# Patient Record
Sex: FEMALE | Race: WHITE | Employment: OTHER | URBAN - METROPOLITAN AREA
[De-identification: names, ages, dates, MRNs, and addresses within clinical notes are randomized per-mention and may not be internally consistent; named-entity substitution may affect disease eponyms.]

---

## 2017-03-20 PROBLEM — S42.034A CLOSED NONDISPLACED FRACTURE OF ACROMIAL END OF RIGHT CLAVICLE: Status: ACTIVE | Noted: 2017-03-20

## 2017-12-10 PROBLEM — S72.141A INTERTROCHANTERIC FRACTURE OF RIGHT HIP, CLOSED, INITIAL ENCOUNTER (HCC): Status: ACTIVE | Noted: 2017-12-10

## 2017-12-11 PROBLEM — I10 ESSENTIAL HYPERTENSION: Chronic | Status: ACTIVE | Noted: 2017-12-11

## 2017-12-11 PROBLEM — E78.2 MIXED HYPERLIPIDEMIA: Chronic | Status: ACTIVE | Noted: 2017-12-11

## 2017-12-12 PROBLEM — J98.01 BRONCHOSPASM: Status: ACTIVE | Noted: 2017-12-12

## 2018-03-13 ENCOUNTER — OFFICE VISIT (OUTPATIENT)
Dept: ORTHOPEDIC SURGERY | Age: 78
End: 2018-03-13

## 2018-03-13 VITALS — BODY MASS INDEX: 26.36 KG/M2 | WEIGHT: 164 LBS | HEIGHT: 66 IN

## 2018-03-13 DIAGNOSIS — S72.141A INTERTROCHANTERIC FRACTURE OF RIGHT HIP, CLOSED, INITIAL ENCOUNTER (HCC): Primary | ICD-10-CM

## 2018-03-13 PROCEDURE — 99024 POSTOP FOLLOW-UP VISIT: CPT | Performed by: ORTHOPAEDIC SURGERY

## 2018-03-13 NOTE — PROGRESS NOTES
tumor,(-) TIA, (-)stroke, (-)headaches, (-)Parkinson disease,(-) memory loss, (-) LOC. Cardiovascular: (-) Chest pain, (-) swelling in legs/feet, (-) SOB, (-) cramping in legs/feet with walking. Subjective:    Constitutional:    The patient is alert and oriented x 3, appears to be stated age and in no distress. Skin:    Upon inspection: the skin appears warm, dry and intact. There is  a previous scar over the affected area. There is not any cellulitis, lymphedema or cutaneous lesions noted in the lower extremities. Upon palpation there is no induration noted. Neurologic:    Gait: normal;  Motor exam of the lower extremities show ; quadriceps, hamstrings, foot dorsi and plantar flexors intact R.  5/5 and L. 5/5. Deep tendon reflexes are 4/4 at the knees and 4/4 at the ankles with strong extensor hallicus longus motor strength bilaterally. Sensory to both feet is intact to all sensory roots. Cardiovascular: The vascular exam is normal and is well perfused to distal extremities. Distal pulses DP/PT: R. 2+; L. 2+. There is cap refill noted less than two seconds in all digits. There is not edema of the bilateral lower extremities. There is not varicosities noted in the distal extremities. Lymph:    Upon palpation,  there is no lymphadenopathy noted in bilateral lower extremities. Musculoskeletal:  Gait: normal; examination of the nails and digits reveal no cyanosis or clubbing. Lumbar exam:    On visual inspection, there is not deformity of the spine. full range of motion, no tenderness, palpable spasm or pain on motion. Special tests: Straight Leg Raise negative, Ilda testnegative. Hip exam:    Upon visual inspection there is not a deformity noted. Patient complains of tenderness at the  No where. Exam of the right hip shows none leg length discrepancy.   Range of motion of the involved/uninvolved hip is 25 degrees internal rotation and 35 degrees external

## 2018-06-11 DIAGNOSIS — S72.141A INTERTROCHANTERIC FRACTURE OF RIGHT HIP, CLOSED, INITIAL ENCOUNTER (HCC): Primary | ICD-10-CM

## 2018-06-12 ENCOUNTER — OFFICE VISIT (OUTPATIENT)
Dept: ORTHOPEDIC SURGERY | Age: 78
End: 2018-06-12
Payer: MEDICARE

## 2018-06-12 DIAGNOSIS — S72.141A INTERTROCHANTERIC FRACTURE OF RIGHT HIP, CLOSED, INITIAL ENCOUNTER (HCC): Primary | ICD-10-CM

## 2018-06-12 PROCEDURE — 99213 OFFICE O/P EST LOW 20 MIN: CPT | Performed by: ORTHOPAEDIC SURGERY

## 2018-08-03 ENCOUNTER — HOSPITAL ENCOUNTER (OUTPATIENT)
Age: 78
Discharge: HOME | End: 2018-08-05
Payer: MEDICARE

## 2018-08-03 ENCOUNTER — HOSPITAL ENCOUNTER (OUTPATIENT)
Dept: GENERAL RADIOLOGY | Age: 78
Discharge: HOME | End: 2018-08-05
Payer: MEDICARE

## 2018-08-03 DIAGNOSIS — J44.1 OBSTRUCTIVE CHRONIC BRONCHITIS WITH EXACERBATION (HCC): ICD-10-CM

## 2018-08-03 PROCEDURE — 71046 X-RAY EXAM CHEST 2 VIEWS: CPT

## 2018-10-10 DIAGNOSIS — M25.561 ACUTE PAIN OF RIGHT KNEE: Primary | ICD-10-CM

## 2018-10-11 ENCOUNTER — OFFICE VISIT (OUTPATIENT)
Dept: ORTHOPEDIC SURGERY | Age: 78
End: 2018-10-11
Payer: MEDICARE

## 2018-10-11 VITALS — WEIGHT: 145 LBS | HEIGHT: 66 IN | BODY MASS INDEX: 23.3 KG/M2

## 2018-10-11 DIAGNOSIS — Z96.651 HISTORY OF TOTAL RIGHT KNEE REPLACEMENT: Primary | ICD-10-CM

## 2018-10-11 PROCEDURE — 99213 OFFICE O/P EST LOW 20 MIN: CPT | Performed by: ORTHOPAEDIC SURGERY

## 2018-10-11 NOTE — PROGRESS NOTES
Chief Complaint   Patient presents with    Knee Pain     Right knee       Subjective:     Patient ID: Dae Quintanilla is a 69 y.o..  female    Knee Pain  Patient complains of right knee pain. This is evaluated as a personal injury. There was not a history of injury. She did have a right TKA in 2014. She reports intermittent swelling and pain. The pain is located anterior. She describes  Her symptoms as aching. She has not experienced popping, clicking, locking, and giving way in the affected knee. The patient has not had pain with kneeling, squating, and climbing stairs. Symptoms improve with rest. The symptoms are worse with activity. The knee has not given out or felt unstable. The patient can bend and straighten the knee fully. The patient is active in none. Treatment to date has been ice, heat, Tylenol, without significant relief. The patient is not working. The patients occupation is retired.      Past Medical History:   Diagnosis Date    Arthritis     joints, knees, back  djd    Chronic back pain     COPD (chronic obstructive pulmonary disease) (HCC)     Diabetes mellitus (HCC)     GERD (gastroesophageal reflux disease)     Hyperlipidemia     Thyroid disease      Past Surgical History:   Procedure Laterality Date    APPENDECTOMY      BREAST SURGERY      cyst     CHOLECYSTECTOMY      COLONOSCOPY      ENDOSCOPY, COLON, DIAGNOSTIC      HYSTERECTOMY      JOINT REPLACEMENT      left knee    KNEE ARTHROPLASTY Right 1/15/2014    TOTAL KNEE ARTHROPLASTY    THYROIDECTOMY         Current Outpatient Prescriptions:     ipratropium-albuterol (DUONEB) 0.5-2.5 (3) MG/3ML SOLN nebulizer solution, Inhale 3 mLs into the lungs every 6 hours as needed for Shortness of Breath, Disp: 360 mL, Rfl: 0    enoxaparin (LOVENOX) 30 MG/0.3ML injection, Inject 0.7 mLs into the skin 2 times daily, Disp: 6 Syringe, Rfl: 1    amLODIPine (NORVASC) 5 MG tablet, Take 1 tablet by mouth daily, Disp: 30 tablet, Rfl:

## 2018-11-11 ENCOUNTER — APPOINTMENT (OUTPATIENT)
Dept: GENERAL RADIOLOGY | Age: 78
DRG: 872 | End: 2018-11-11
Payer: MEDICARE

## 2018-11-11 ENCOUNTER — HOSPITAL ENCOUNTER (INPATIENT)
Age: 78
LOS: 3 days | Discharge: NURSING, REHAB OR LONG-TERM CARE | DRG: 872 | End: 2018-11-14
Attending: EMERGENCY MEDICINE | Admitting: INTERNAL MEDICINE
Payer: MEDICARE

## 2018-11-11 ENCOUNTER — APPOINTMENT (OUTPATIENT)
Dept: CT IMAGING | Age: 78
DRG: 872 | End: 2018-11-11
Payer: MEDICARE

## 2018-11-11 DIAGNOSIS — N39.0 URINARY TRACT INFECTION WITHOUT HEMATURIA, SITE UNSPECIFIED: Primary | ICD-10-CM

## 2018-11-11 DIAGNOSIS — M54.2 NECK PAIN: ICD-10-CM

## 2018-11-11 DIAGNOSIS — E87.6 HYPOKALEMIA: ICD-10-CM

## 2018-11-11 DIAGNOSIS — R00.0 SINUS TACHYCARDIA: ICD-10-CM

## 2018-11-11 LAB
ALBUMIN SERPL-MCNC: 3.3 G/DL (ref 3.5–5.2)
ALP BLD-CCNC: 74 U/L (ref 35–104)
ALT SERPL-CCNC: 15 U/L (ref 0–32)
ANION GAP SERPL CALCULATED.3IONS-SCNC: 17 MMOL/L (ref 7–16)
AST SERPL-CCNC: 29 U/L (ref 0–31)
BACTERIA: ABNORMAL /HPF
BASOPHILS ABSOLUTE: 0 E9/L (ref 0–0.2)
BASOPHILS ABSOLUTE: 0.2 E9/L (ref 0–0.2)
BASOPHILS RELATIVE PERCENT: 0.2 % (ref 0–2)
BASOPHILS RELATIVE PERCENT: 0.9 % (ref 0–2)
BILIRUB SERPL-MCNC: 0.8 MG/DL (ref 0–1.2)
BILIRUBIN URINE: NEGATIVE
BLOOD, URINE: ABNORMAL
BUN BLDV-MCNC: 22 MG/DL (ref 8–23)
BURR CELLS: ABNORMAL
CALCIUM SERPL-MCNC: 8.5 MG/DL (ref 8.6–10.2)
CASTS: ABNORMAL /LPF
CHLORIDE BLD-SCNC: 88 MMOL/L (ref 98–107)
CK MB: 3.4 NG/ML (ref 0–4.3)
CLARITY: CLEAR
CO2: 27 MMOL/L (ref 22–29)
COLOR: YELLOW
CREAT SERPL-MCNC: 0.9 MG/DL (ref 0.5–1)
EOSINOPHILS ABSOLUTE: 0 E9/L (ref 0.05–0.5)
EOSINOPHILS ABSOLUTE: 0 E9/L (ref 0.05–0.5)
EOSINOPHILS RELATIVE PERCENT: 0 % (ref 0–6)
EOSINOPHILS RELATIVE PERCENT: 0 % (ref 0–6)
EPITHELIAL CELLS, UA: ABNORMAL /HPF
GFR AFRICAN AMERICAN: >60
GFR NON-AFRICAN AMERICAN: >60 ML/MIN/1.73
GLUCOSE BLD-MCNC: 214 MG/DL (ref 74–99)
GLUCOSE URINE: NEGATIVE MG/DL
HCT VFR BLD CALC: 40.3 % (ref 34–48)
HCT VFR BLD CALC: 42 % (ref 34–48)
HEMOGLOBIN: 13.2 G/DL (ref 11.5–15.5)
HEMOGLOBIN: 14.1 G/DL (ref 11.5–15.5)
INR BLD: 1.8
KETONES, URINE: ABNORMAL MG/DL
LACTIC ACID: 1 MMOL/L (ref 0.5–2.2)
LEUKOCYTE ESTERASE, URINE: ABNORMAL
LYMPHOCYTES ABSOLUTE: 2.03 E9/L (ref 1.5–4)
LYMPHOCYTES ABSOLUTE: 2.69 E9/L (ref 1.5–4)
LYMPHOCYTES RELATIVE PERCENT: 12.2 % (ref 20–42)
LYMPHOCYTES RELATIVE PERCENT: 8.6 % (ref 20–42)
MAGNESIUM: 0.4 MG/DL (ref 1.6–2.6)
MAGNESIUM: 0.7 MG/DL (ref 1.6–2.6)
MAGNESIUM: 1.5 MG/DL (ref 1.6–2.6)
MCH RBC QN AUTO: 27.8 PG (ref 26–35)
MCH RBC QN AUTO: 27.9 PG (ref 26–35)
MCHC RBC AUTO-ENTMCNC: 32.8 % (ref 32–34.5)
MCHC RBC AUTO-ENTMCNC: 33.6 % (ref 32–34.5)
MCV RBC AUTO: 83.2 FL (ref 80–99.9)
MCV RBC AUTO: 85 FL (ref 80–99.9)
METER GLUCOSE: 258 MG/DL (ref 74–99)
MONOCYTES ABSOLUTE: 2.46 E9/L (ref 0.1–0.95)
MONOCYTES ABSOLUTE: 3.38 E9/L (ref 0.1–0.95)
MONOCYTES RELATIVE PERCENT: 11.3 % (ref 2–12)
MONOCYTES RELATIVE PERCENT: 14.7 % (ref 2–12)
MYELOCYTE PERCENT: 0.9 % (ref 0–0)
NEUTROPHILS ABSOLUTE: 17.1 E9/L (ref 1.8–7.3)
NEUTROPHILS ABSOLUTE: 17.25 E9/L (ref 1.8–7.3)
NEUTROPHILS RELATIVE PERCENT: 75 % (ref 43–80)
NEUTROPHILS RELATIVE PERCENT: 76.5 % (ref 43–80)
NITRITE, URINE: NEGATIVE
OVALOCYTES: ABNORMAL
OVALOCYTES: ABNORMAL
PDW BLD-RTO: 13.2 FL (ref 11.5–15)
PDW BLD-RTO: 13.5 FL (ref 11.5–15)
PH UA: 6 (ref 5–9)
PLATELET # BLD: 277 E9/L (ref 130–450)
PLATELET # BLD: 352 E9/L (ref 130–450)
PMV BLD AUTO: 11.5 FL (ref 7–12)
PMV BLD AUTO: 12.4 FL (ref 7–12)
POIKILOCYTES: ABNORMAL
POIKILOCYTES: ABNORMAL
POLYCHROMASIA: ABNORMAL
POTASSIUM SERPL-SCNC: 3.1 MMOL/L (ref 3.5–5)
PRO-BNP: 179 PG/ML (ref 0–450)
PROTEIN UA: >=300 MG/DL
PROTHROMBIN TIME: 20.8 SEC (ref 9.3–12.4)
RBC # BLD: 4.74 E12/L (ref 3.5–5.5)
RBC # BLD: 5.05 E12/L (ref 3.5–5.5)
RBC UA: ABNORMAL /HPF (ref 0–2)
SODIUM BLD-SCNC: 132 MMOL/L (ref 132–146)
SPECIFIC GRAVITY UA: 1.02 (ref 1–1.03)
TOTAL CK: 106 U/L (ref 20–180)
TOTAL PROTEIN: 7.4 G/DL (ref 6.4–8.3)
TROPONIN: 0.01 NG/ML (ref 0–0.03)
TROPONIN: 0.01 NG/ML (ref 0–0.03)
TSH SERPL DL<=0.05 MIU/L-ACNC: 0.57 UIU/ML (ref 0.27–4.2)
UROBILINOGEN, URINE: 0.2 E.U./DL
WBC # BLD: 22.4 E9/L (ref 4.5–11.5)
WBC # BLD: 22.5 E9/L (ref 4.5–11.5)
WBC UA: ABNORMAL /HPF (ref 0–5)

## 2018-11-11 PROCEDURE — 71046 X-RAY EXAM CHEST 2 VIEWS: CPT

## 2018-11-11 PROCEDURE — 84484 ASSAY OF TROPONIN QUANT: CPT

## 2018-11-11 PROCEDURE — 72100 X-RAY EXAM L-S SPINE 2/3 VWS: CPT

## 2018-11-11 PROCEDURE — 83605 ASSAY OF LACTIC ACID: CPT

## 2018-11-11 PROCEDURE — 6370000000 HC RX 637 (ALT 250 FOR IP): Performed by: EMERGENCY MEDICINE

## 2018-11-11 PROCEDURE — 73110 X-RAY EXAM OF WRIST: CPT

## 2018-11-11 PROCEDURE — 6360000002 HC RX W HCPCS: Performed by: FAMILY MEDICINE

## 2018-11-11 PROCEDURE — 84443 ASSAY THYROID STIM HORMONE: CPT

## 2018-11-11 PROCEDURE — 2580000003 HC RX 258: Performed by: EMERGENCY MEDICINE

## 2018-11-11 PROCEDURE — 2580000003 HC RX 258

## 2018-11-11 PROCEDURE — 2500000003 HC RX 250 WO HCPCS: Performed by: FAMILY MEDICINE

## 2018-11-11 PROCEDURE — 82962 GLUCOSE BLOOD TEST: CPT

## 2018-11-11 PROCEDURE — 83735 ASSAY OF MAGNESIUM: CPT

## 2018-11-11 PROCEDURE — 85610 PROTHROMBIN TIME: CPT

## 2018-11-11 PROCEDURE — 87088 URINE BACTERIA CULTURE: CPT

## 2018-11-11 PROCEDURE — 99285 EMERGENCY DEPT VISIT HI MDM: CPT

## 2018-11-11 PROCEDURE — 81001 URINALYSIS AUTO W/SCOPE: CPT

## 2018-11-11 PROCEDURE — 82550 ASSAY OF CK (CPK): CPT

## 2018-11-11 PROCEDURE — 2060000000 HC ICU INTERMEDIATE R&B

## 2018-11-11 PROCEDURE — 83880 ASSAY OF NATRIURETIC PEPTIDE: CPT

## 2018-11-11 PROCEDURE — 96374 THER/PROPH/DIAG INJ IV PUSH: CPT

## 2018-11-11 PROCEDURE — 6360000002 HC RX W HCPCS: Performed by: EMERGENCY MEDICINE

## 2018-11-11 PROCEDURE — 72125 CT NECK SPINE W/O DYE: CPT

## 2018-11-11 PROCEDURE — 6370000000 HC RX 637 (ALT 250 FOR IP): Performed by: FAMILY MEDICINE

## 2018-11-11 PROCEDURE — 87040 BLOOD CULTURE FOR BACTERIA: CPT

## 2018-11-11 PROCEDURE — 82553 CREATINE MB FRACTION: CPT

## 2018-11-11 PROCEDURE — 84145 PROCALCITONIN (PCT): CPT

## 2018-11-11 PROCEDURE — 36415 COLL VENOUS BLD VENIPUNCTURE: CPT

## 2018-11-11 PROCEDURE — 85025 COMPLETE CBC W/AUTO DIFF WBC: CPT

## 2018-11-11 PROCEDURE — 96375 TX/PRO/DX INJ NEW DRUG ADDON: CPT

## 2018-11-11 PROCEDURE — 2580000003 HC RX 258: Performed by: FAMILY MEDICINE

## 2018-11-11 PROCEDURE — 80053 COMPREHEN METABOLIC PANEL: CPT

## 2018-11-11 RX ORDER — 0.9 % SODIUM CHLORIDE 0.9 %
500 INTRAVENOUS SOLUTION INTRAVENOUS ONCE
Status: COMPLETED | OUTPATIENT
Start: 2018-11-11 | End: 2018-11-11

## 2018-11-11 RX ORDER — KETOROLAC TROMETHAMINE 15 MG/ML
15 INJECTION, SOLUTION INTRAMUSCULAR; INTRAVENOUS ONCE
Status: COMPLETED | OUTPATIENT
Start: 2018-11-11 | End: 2018-11-11

## 2018-11-11 RX ORDER — OXYCODONE HYDROCHLORIDE AND ACETAMINOPHEN 5; 325 MG/1; MG/1
1 TABLET ORAL EVERY 4 HOURS PRN
Status: DISCONTINUED | OUTPATIENT
Start: 2018-11-11 | End: 2018-11-11

## 2018-11-11 RX ORDER — DULOXETIN HYDROCHLORIDE 20 MG/1
40 CAPSULE, DELAYED RELEASE ORAL DAILY
Status: DISCONTINUED | OUTPATIENT
Start: 2018-11-12 | End: 2018-11-14 | Stop reason: HOSPADM

## 2018-11-11 RX ORDER — MAGNESIUM SULFATE IN WATER 40 MG/ML
2 INJECTION, SOLUTION INTRAVENOUS ONCE
Status: COMPLETED | OUTPATIENT
Start: 2018-11-11 | End: 2018-11-12

## 2018-11-11 RX ORDER — NICOTINE 21 MG/24HR
1 PATCH, TRANSDERMAL 24 HOURS TRANSDERMAL DAILY
Status: DISCONTINUED | OUTPATIENT
Start: 2018-11-11 | End: 2018-11-14 | Stop reason: HOSPADM

## 2018-11-11 RX ORDER — NICOTINE POLACRILEX 4 MG
15 LOZENGE BUCCAL PRN
Status: DISCONTINUED | OUTPATIENT
Start: 2018-11-11 | End: 2018-11-14 | Stop reason: HOSPADM

## 2018-11-11 RX ORDER — METOPROLOL TARTRATE 5 MG/5ML
10 INJECTION INTRAVENOUS EVERY 6 HOURS PRN
Status: DISCONTINUED | OUTPATIENT
Start: 2018-11-11 | End: 2018-11-12

## 2018-11-11 RX ORDER — SODIUM CHLORIDE 9 MG/ML
INJECTION, SOLUTION INTRAVENOUS
Status: COMPLETED
Start: 2018-11-11 | End: 2018-11-11

## 2018-11-11 RX ORDER — DEXTROSE MONOHYDRATE 50 MG/ML
100 INJECTION, SOLUTION INTRAVENOUS PRN
Status: DISCONTINUED | OUTPATIENT
Start: 2018-11-11 | End: 2018-11-14 | Stop reason: HOSPADM

## 2018-11-11 RX ORDER — LEVOTHYROXINE SODIUM 0.15 MG/1
150 TABLET ORAL DAILY
COMMUNITY
End: 2019-03-22

## 2018-11-11 RX ORDER — LOSARTAN POTASSIUM 50 MG/1
50 TABLET ORAL NIGHTLY
Status: DISCONTINUED | OUTPATIENT
Start: 2018-11-11 | End: 2018-11-14 | Stop reason: HOSPADM

## 2018-11-11 RX ORDER — FENTANYL CITRATE 50 UG/ML
25 INJECTION, SOLUTION INTRAMUSCULAR; INTRAVENOUS
Status: DISCONTINUED | OUTPATIENT
Start: 2018-11-11 | End: 2018-11-12

## 2018-11-11 RX ORDER — DEXTROSE MONOHYDRATE 25 G/50ML
12.5 INJECTION, SOLUTION INTRAVENOUS PRN
Status: DISCONTINUED | OUTPATIENT
Start: 2018-11-11 | End: 2018-11-14 | Stop reason: HOSPADM

## 2018-11-11 RX ORDER — GLIMEPIRIDE 2 MG/1
2 TABLET ORAL
Status: DISCONTINUED | OUTPATIENT
Start: 2018-11-12 | End: 2018-11-14 | Stop reason: HOSPADM

## 2018-11-11 RX ORDER — METOCLOPRAMIDE 5 MG/1
5 TABLET ORAL 2 TIMES DAILY
COMMUNITY
End: 2019-03-22

## 2018-11-11 RX ORDER — SODIUM CHLORIDE, SODIUM LACTATE, POTASSIUM CHLORIDE, CALCIUM CHLORIDE 600; 310; 30; 20 MG/100ML; MG/100ML; MG/100ML; MG/100ML
INJECTION, SOLUTION INTRAVENOUS CONTINUOUS
Status: DISCONTINUED | OUTPATIENT
Start: 2018-11-11 | End: 2018-11-14

## 2018-11-11 RX ORDER — KETOROLAC TROMETHAMINE 15 MG/ML
15 INJECTION, SOLUTION INTRAMUSCULAR; INTRAVENOUS EVERY 6 HOURS PRN
Status: DISCONTINUED | OUTPATIENT
Start: 2018-11-11 | End: 2018-11-11

## 2018-11-11 RX ORDER — CLONIDINE HYDROCHLORIDE 0.1 MG/1
0.1 TABLET ORAL 2 TIMES DAILY
Status: DISCONTINUED | OUTPATIENT
Start: 2018-11-11 | End: 2018-11-14 | Stop reason: HOSPADM

## 2018-11-11 RX ORDER — MORPHINE SULFATE 2 MG/ML
2 INJECTION, SOLUTION INTRAMUSCULAR; INTRAVENOUS EVERY 4 HOURS PRN
Status: DISCONTINUED | OUTPATIENT
Start: 2018-11-11 | End: 2018-11-11

## 2018-11-11 RX ORDER — POTASSIUM CHLORIDE 1.5 G/1.77G
40 POWDER, FOR SOLUTION ORAL ONCE
Status: COMPLETED | OUTPATIENT
Start: 2018-11-11 | End: 2018-11-11

## 2018-11-11 RX ORDER — MAGNESIUM SULFATE 1 G/100ML
1 INJECTION INTRAVENOUS ONCE
Status: COMPLETED | OUTPATIENT
Start: 2018-11-11 | End: 2018-11-11

## 2018-11-11 RX ORDER — POTASSIUM CHLORIDE 20MEQ/15ML
20 LIQUID (ML) ORAL ONCE
Status: COMPLETED | OUTPATIENT
Start: 2018-11-11 | End: 2018-11-11

## 2018-11-11 RX ORDER — AMLODIPINE BESYLATE 2.5 MG/1
2.5 TABLET ORAL DAILY
Status: DISCONTINUED | OUTPATIENT
Start: 2018-11-11 | End: 2018-11-14 | Stop reason: HOSPADM

## 2018-11-11 RX ORDER — 0.9 % SODIUM CHLORIDE 0.9 %
1000 INTRAVENOUS SOLUTION INTRAVENOUS ONCE
Status: COMPLETED | OUTPATIENT
Start: 2018-11-11 | End: 2018-11-11

## 2018-11-11 RX ORDER — HYDRALAZINE HYDROCHLORIDE 20 MG/ML
5 INJECTION INTRAMUSCULAR; INTRAVENOUS EVERY 6 HOURS PRN
Status: DISCONTINUED | OUTPATIENT
Start: 2018-11-11 | End: 2018-11-11

## 2018-11-11 RX ORDER — PANTOPRAZOLE SODIUM 40 MG/1
40 TABLET, DELAYED RELEASE ORAL
Status: DISCONTINUED | OUTPATIENT
Start: 2018-11-12 | End: 2018-11-14 | Stop reason: HOSPADM

## 2018-11-11 RX ORDER — HYDRALAZINE HYDROCHLORIDE 20 MG/ML
10 INJECTION INTRAMUSCULAR; INTRAVENOUS EVERY 6 HOURS PRN
Status: DISCONTINUED | OUTPATIENT
Start: 2018-11-11 | End: 2018-11-12

## 2018-11-11 RX ORDER — CHOLESTYRAMINE LIGHT 4 G/5.7G
4 POWDER, FOR SUSPENSION ORAL DAILY
Status: DISCONTINUED | OUTPATIENT
Start: 2018-11-11 | End: 2018-11-14 | Stop reason: HOSPADM

## 2018-11-11 RX ORDER — METOCLOPRAMIDE 5 MG/1
5 TABLET ORAL 4 TIMES DAILY
Status: DISCONTINUED | OUTPATIENT
Start: 2018-11-11 | End: 2018-11-14 | Stop reason: HOSPADM

## 2018-11-11 RX ORDER — MONTELUKAST SODIUM 10 MG/1
10 TABLET ORAL NIGHTLY
Status: DISCONTINUED | OUTPATIENT
Start: 2018-11-11 | End: 2018-11-14 | Stop reason: HOSPADM

## 2018-11-11 RX ORDER — IPRATROPIUM BROMIDE AND ALBUTEROL SULFATE 2.5; .5 MG/3ML; MG/3ML
3 SOLUTION RESPIRATORY (INHALATION) EVERY 6 HOURS PRN
Status: DISCONTINUED | OUTPATIENT
Start: 2018-11-11 | End: 2018-11-13

## 2018-11-11 RX ORDER — METOPROLOL TARTRATE 5 MG/5ML
5 INJECTION INTRAVENOUS EVERY 6 HOURS PRN
Status: DISCONTINUED | OUTPATIENT
Start: 2018-11-11 | End: 2018-11-11

## 2018-11-11 RX ORDER — LANOLIN ALCOHOL/MO/W.PET/CERES
3 CREAM (GRAM) TOPICAL DAILY PRN
Status: DISCONTINUED | OUTPATIENT
Start: 2018-11-11 | End: 2018-11-14 | Stop reason: HOSPADM

## 2018-11-11 RX ORDER — AMLODIPINE BESYLATE 5 MG/1
5 TABLET ORAL ONCE
Status: COMPLETED | OUTPATIENT
Start: 2018-11-11 | End: 2018-11-11

## 2018-11-11 RX ORDER — MAGNESIUM SULFATE IN WATER 40 MG/ML
2 INJECTION, SOLUTION INTRAVENOUS ONCE
Status: COMPLETED | OUTPATIENT
Start: 2018-11-12 | End: 2018-11-12

## 2018-11-11 RX ORDER — WARFARIN SODIUM 5 MG/1
5 TABLET ORAL
Status: COMPLETED | OUTPATIENT
Start: 2018-11-11 | End: 2018-11-11

## 2018-11-11 RX ORDER — ACETAMINOPHEN 325 MG/1
650 TABLET ORAL EVERY 4 HOURS PRN
Status: DISCONTINUED | OUTPATIENT
Start: 2018-11-11 | End: 2018-11-14 | Stop reason: HOSPADM

## 2018-11-11 RX ORDER — POTASSIUM BICARBONATE 25 MEQ/1
50 TABLET, EFFERVESCENT ORAL ONCE
Status: DISCONTINUED | OUTPATIENT
Start: 2018-11-11 | End: 2018-11-11 | Stop reason: CLARIF

## 2018-11-11 RX ORDER — LEVOTHYROXINE SODIUM 0.15 MG/1
150 TABLET ORAL DAILY
Status: DISCONTINUED | OUTPATIENT
Start: 2018-11-12 | End: 2018-11-14 | Stop reason: HOSPADM

## 2018-11-11 RX ADMIN — AMLODIPINE BESYLATE 5 MG: 5 TABLET ORAL at 16:12

## 2018-11-11 RX ADMIN — SODIUM CHLORIDE 1000 ML: 9 INJECTION, SOLUTION INTRAVENOUS at 12:05

## 2018-11-11 RX ADMIN — POTASSIUM CHLORIDE 20 MEQ: 40 SOLUTION ORAL at 21:09

## 2018-11-11 RX ADMIN — METOPROLOL TARTRATE 5 MG: 5 INJECTION, SOLUTION INTRAVENOUS at 19:15

## 2018-11-11 RX ADMIN — CLONIDINE HYDROCHLORIDE 0.1 MG: 0.1 TABLET ORAL at 21:08

## 2018-11-11 RX ADMIN — LOSARTAN POTASSIUM 50 MG: 50 TABLET ORAL at 21:08

## 2018-11-11 RX ADMIN — MAGNESIUM SULFATE HEPTAHYDRATE 2 G: 40 INJECTION, SOLUTION INTRAVENOUS at 20:55

## 2018-11-11 RX ADMIN — WARFARIN SODIUM 5 MG: 5 TABLET ORAL at 21:18

## 2018-11-11 RX ADMIN — AMLODIPINE BESYLATE 2.5 MG: 2.5 TABLET ORAL at 21:08

## 2018-11-11 RX ADMIN — SODIUM CHLORIDE, POTASSIUM CHLORIDE, SODIUM LACTATE AND CALCIUM CHLORIDE: 600; 310; 30; 20 INJECTION, SOLUTION INTRAVENOUS at 19:32

## 2018-11-11 RX ADMIN — MAGNESIUM SULFATE IN DEXTROSE 1 G: 10 INJECTION, SOLUTION INTRAVENOUS at 16:39

## 2018-11-11 RX ADMIN — CEFTRIAXONE 2 G: 2 INJECTION, POWDER, FOR SOLUTION INTRAMUSCULAR; INTRAVENOUS at 15:25

## 2018-11-11 RX ADMIN — SODIUM CHLORIDE 500 ML: 9 INJECTION, SOLUTION INTRAVENOUS at 16:12

## 2018-11-11 RX ADMIN — INSULIN LISPRO 6 UNITS: 100 INJECTION, SOLUTION INTRAVENOUS; SUBCUTANEOUS at 21:23

## 2018-11-11 RX ADMIN — METOCLOPRAMIDE HYDROCHLORIDE 5 MG: 5 TABLET ORAL at 21:08

## 2018-11-11 RX ADMIN — POTASSIUM CHLORIDE 40 MEQ: 1.5 POWDER, FOR SOLUTION ORAL at 13:28

## 2018-11-11 RX ADMIN — KETOROLAC TROMETHAMINE 15 MG: 15 INJECTION, SOLUTION INTRAMUSCULAR; INTRAVENOUS at 20:46

## 2018-11-11 RX ADMIN — MONTELUKAST SODIUM 10 MG: 10 TABLET, FILM COATED ORAL at 21:17

## 2018-11-11 RX ADMIN — SODIUM CHLORIDE 500 ML: 9 INJECTION, SOLUTION INTRAVENOUS at 14:11

## 2018-11-11 RX ADMIN — MELATONIN TAB 3 MG 3 MG: 3 TAB at 21:17

## 2018-11-11 RX ADMIN — Medication 500 ML: at 14:11

## 2018-11-11 RX ADMIN — KETOROLAC TROMETHAMINE 15 MG: 15 INJECTION, SOLUTION INTRAMUSCULAR; INTRAVENOUS at 12:05

## 2018-11-11 ASSESSMENT — PAIN SCALES - GENERAL
PAINLEVEL_OUTOF10: 0
PAINLEVEL_OUTOF10: 10
PAINLEVEL_OUTOF10: 6

## 2018-11-11 ASSESSMENT — ENCOUNTER SYMPTOMS
ABDOMINAL PAIN: 0
NAUSEA: 0
EYES NEGATIVE: 1
VOMITING: 0
CONSTIPATION: 0
SHORTNESS OF BREATH: 0
CHEST TIGHTNESS: 0
DIARRHEA: 0
BACK PAIN: 1
WHEEZING: 0
COUGH: 0
ABDOMINAL DISTENTION: 0

## 2018-11-11 ASSESSMENT — PAIN DESCRIPTION - DESCRIPTORS: DESCRIPTORS: DISCOMFORT

## 2018-11-11 ASSESSMENT — PAIN DESCRIPTION - ONSET: ONSET: ON-GOING

## 2018-11-11 ASSESSMENT — PAIN DESCRIPTION - LOCATION: LOCATION: WRIST;BACK;NECK

## 2018-11-11 ASSESSMENT — PAIN DESCRIPTION - ORIENTATION: ORIENTATION: LEFT

## 2018-11-11 ASSESSMENT — PAIN DESCRIPTION - PROGRESSION: CLINICAL_PROGRESSION: NOT CHANGED

## 2018-11-11 ASSESSMENT — PAIN DESCRIPTION - PAIN TYPE: TYPE: CHRONIC PAIN

## 2018-11-12 ENCOUNTER — APPOINTMENT (OUTPATIENT)
Dept: NUCLEAR MEDICINE | Age: 78
DRG: 872 | End: 2018-11-12
Payer: MEDICARE

## 2018-11-12 LAB
ALBUMIN SERPL-MCNC: 2.4 G/DL (ref 3.5–5.2)
ALP BLD-CCNC: 78 U/L (ref 35–104)
ALT SERPL-CCNC: 11 U/L (ref 0–32)
ANION GAP SERPL CALCULATED.3IONS-SCNC: 15 MMOL/L (ref 7–16)
AST SERPL-CCNC: 19 U/L (ref 0–31)
BASOPHILS ABSOLUTE: 0.2 E9/L (ref 0–0.2)
BASOPHILS RELATIVE PERCENT: 0.9 % (ref 0–2)
BILIRUB SERPL-MCNC: 0.4 MG/DL (ref 0–1.2)
BUN BLDV-MCNC: 16 MG/DL (ref 8–23)
CALCIUM SERPL-MCNC: 8.6 MG/DL (ref 8.6–10.2)
CHLORIDE BLD-SCNC: 94 MMOL/L (ref 98–107)
CHOLESTEROL, TOTAL: 122 MG/DL (ref 0–199)
CK MB: 3 NG/ML (ref 0–4.3)
CO2: 24 MMOL/L (ref 22–29)
CREAT SERPL-MCNC: 0.8 MG/DL (ref 0.5–1)
EOSINOPHILS ABSOLUTE: 0 E9/L (ref 0.05–0.5)
EOSINOPHILS RELATIVE PERCENT: 0.4 % (ref 0–6)
FILM ARRAY ADENOVIRUS: NORMAL
FILM ARRAY BORDETELLA PERTUSSIS: NORMAL
FILM ARRAY CHLAMYDOPHILIA PNEUMONIAE: NORMAL
FILM ARRAY CORONAVIRUS 229E: NORMAL
FILM ARRAY CORONAVIRUS HKU1: NORMAL
FILM ARRAY CORONAVIRUS NL63: NORMAL
FILM ARRAY CORONAVIRUS OC43: NORMAL
FILM ARRAY INFLUENZA A VIRUS 09H1: NORMAL
FILM ARRAY INFLUENZA A VIRUS H1: NORMAL
FILM ARRAY INFLUENZA A VIRUS H3: NORMAL
FILM ARRAY INFLUENZA A VIRUS: NORMAL
FILM ARRAY INFLUENZA B: NORMAL
FILM ARRAY METAPNEUMOVIRUS: NORMAL
FILM ARRAY MYCOPLASMA PNEUMONIAE: NORMAL
FILM ARRAY PARAINFLUENZA VIRUS 1: NORMAL
FILM ARRAY PARAINFLUENZA VIRUS 2: NORMAL
FILM ARRAY PARAINFLUENZA VIRUS 3: NORMAL
FILM ARRAY PARAINFLUENZA VIRUS 4: NORMAL
FILM ARRAY RESPIRATORY SYNCITIAL VIRUS: NORMAL
FILM ARRAY RHINOVIRUS/ENTEROVIRUS: NORMAL
GFR AFRICAN AMERICAN: >60
GFR NON-AFRICAN AMERICAN: >60 ML/MIN/1.73
GLUCOSE BLD-MCNC: 142 MG/DL (ref 74–99)
HBA1C MFR BLD: 5.9 % (ref 4–5.6)
HCT VFR BLD CALC: 35.1 % (ref 34–48)
HDLC SERPL-MCNC: 63 MG/DL
HEMOGLOBIN: 11.7 G/DL (ref 11.5–15.5)
INR BLD: 1.5
LDL CHOLESTEROL CALCULATED: 37 MG/DL (ref 0–99)
LV EF: 57 %
LV EF: 63 %
LVEF MODALITY: NORMAL
LVEF MODALITY: NORMAL
LYMPHOCYTES ABSOLUTE: 2.03 E9/L (ref 1.5–4)
LYMPHOCYTES RELATIVE PERCENT: 8.5 % (ref 20–42)
MAGNESIUM: 1.7 MG/DL (ref 1.6–2.6)
MCH RBC QN AUTO: 28.2 PG (ref 26–35)
MCHC RBC AUTO-ENTMCNC: 33.3 % (ref 32–34.5)
MCV RBC AUTO: 84.6 FL (ref 80–99.9)
METER GLUCOSE: 137 MG/DL (ref 74–99)
METER GLUCOSE: 160 MG/DL (ref 74–99)
METER GLUCOSE: 206 MG/DL (ref 74–99)
MONOCYTES ABSOLUTE: 0.68 E9/L (ref 0.1–0.95)
MONOCYTES RELATIVE PERCENT: 3.4 % (ref 2–12)
NEUTROPHILS ABSOLUTE: 19.66 E9/L (ref 1.8–7.3)
NEUTROPHILS RELATIVE PERCENT: 87.2 % (ref 43–80)
PDW BLD-RTO: 13.4 FL (ref 11.5–15)
PLATELET # BLD: 264 E9/L (ref 130–450)
PMV BLD AUTO: 11.9 FL (ref 7–12)
POTASSIUM SERPL-SCNC: 3.5 MMOL/L (ref 3.5–5)
PROCALCITONIN: 0.83 NG/ML (ref 0–0.08)
PROTHROMBIN TIME: 17.2 SEC (ref 9.3–12.4)
RBC # BLD: 4.15 E12/L (ref 3.5–5.5)
SEDIMENTATION RATE, ERYTHROCYTE: 115 MM/HR (ref 0–20)
SODIUM BLD-SCNC: 133 MMOL/L (ref 132–146)
TOTAL PROTEIN: 6.4 G/DL (ref 6.4–8.3)
TRIGL SERPL-MCNC: 110 MG/DL (ref 0–149)
TROPONIN: 0.01 NG/ML (ref 0–0.03)
VLDLC SERPL CALC-MCNC: 22 MG/DL
WBC # BLD: 22.6 E9/L (ref 4.5–11.5)

## 2018-11-12 PROCEDURE — 51798 US URINE CAPACITY MEASURE: CPT

## 2018-11-12 PROCEDURE — 83735 ASSAY OF MAGNESIUM: CPT

## 2018-11-12 PROCEDURE — 93005 ELECTROCARDIOGRAM TRACING: CPT | Performed by: FAMILY MEDICINE

## 2018-11-12 PROCEDURE — 87581 M.PNEUMON DNA AMP PROBE: CPT

## 2018-11-12 PROCEDURE — 83036 HEMOGLOBIN GLYCOSYLATED A1C: CPT

## 2018-11-12 PROCEDURE — 84484 ASSAY OF TROPONIN QUANT: CPT

## 2018-11-12 PROCEDURE — 93306 TTE W/DOPPLER COMPLETE: CPT

## 2018-11-12 PROCEDURE — 85025 COMPLETE CBC W/AUTO DIFF WBC: CPT

## 2018-11-12 PROCEDURE — 87633 RESP VIRUS 12-25 TARGETS: CPT

## 2018-11-12 PROCEDURE — 93017 CV STRESS TEST TRACING ONLY: CPT

## 2018-11-12 PROCEDURE — 3430000000 HC RX DIAGNOSTIC RADIOPHARMACEUTICAL: Performed by: RADIOLOGY

## 2018-11-12 PROCEDURE — 2580000003 HC RX 258: Performed by: FAMILY MEDICINE

## 2018-11-12 PROCEDURE — 97110 THERAPEUTIC EXERCISES: CPT

## 2018-11-12 PROCEDURE — G8988 SELF CARE GOAL STATUS: HCPCS

## 2018-11-12 PROCEDURE — 6360000002 HC RX W HCPCS: Performed by: FAMILY MEDICINE

## 2018-11-12 PROCEDURE — 2060000000 HC ICU INTERMEDIATE R&B

## 2018-11-12 PROCEDURE — 6370000000 HC RX 637 (ALT 250 FOR IP): Performed by: FAMILY MEDICINE

## 2018-11-12 PROCEDURE — 80061 LIPID PANEL: CPT

## 2018-11-12 PROCEDURE — 80053 COMPREHEN METABOLIC PANEL: CPT

## 2018-11-12 PROCEDURE — 78452 HT MUSCLE IMAGE SPECT MULT: CPT

## 2018-11-12 PROCEDURE — 85651 RBC SED RATE NONAUTOMATED: CPT

## 2018-11-12 PROCEDURE — 97530 THERAPEUTIC ACTIVITIES: CPT | Performed by: PHYSICAL THERAPIST

## 2018-11-12 PROCEDURE — 87486 CHLMYD PNEUM DNA AMP PROBE: CPT

## 2018-11-12 PROCEDURE — 36415 COLL VENOUS BLD VENIPUNCTURE: CPT

## 2018-11-12 PROCEDURE — 87798 DETECT AGENT NOS DNA AMP: CPT

## 2018-11-12 PROCEDURE — 82962 GLUCOSE BLOOD TEST: CPT

## 2018-11-12 PROCEDURE — G8978 MOBILITY CURRENT STATUS: HCPCS | Performed by: PHYSICAL THERAPIST

## 2018-11-12 PROCEDURE — 97165 OT EVAL LOW COMPLEX 30 MIN: CPT

## 2018-11-12 PROCEDURE — 6370000000 HC RX 637 (ALT 250 FOR IP): Performed by: INTERNAL MEDICINE

## 2018-11-12 PROCEDURE — 94640 AIRWAY INHALATION TREATMENT: CPT

## 2018-11-12 PROCEDURE — 97161 PT EVAL LOW COMPLEX 20 MIN: CPT | Performed by: PHYSICAL THERAPIST

## 2018-11-12 PROCEDURE — 6360000002 HC RX W HCPCS: Performed by: INTERNAL MEDICINE

## 2018-11-12 PROCEDURE — G8987 SELF CARE CURRENT STATUS: HCPCS

## 2018-11-12 PROCEDURE — G8979 MOBILITY GOAL STATUS: HCPCS | Performed by: PHYSICAL THERAPIST

## 2018-11-12 PROCEDURE — 51701 INSERT BLADDER CATHETER: CPT

## 2018-11-12 PROCEDURE — 97124 MASSAGE THERAPY: CPT | Performed by: PHYSICAL THERAPIST

## 2018-11-12 PROCEDURE — A9500 TC99M SESTAMIBI: HCPCS | Performed by: RADIOLOGY

## 2018-11-12 PROCEDURE — 85610 PROTHROMBIN TIME: CPT

## 2018-11-12 RX ORDER — CYCLOBENZAPRINE HCL 10 MG
5 TABLET ORAL 3 TIMES DAILY PRN
Status: DISCONTINUED | OUTPATIENT
Start: 2018-11-12 | End: 2018-11-14 | Stop reason: HOSPADM

## 2018-11-12 RX ORDER — MAGNESIUM SULFATE 1 G/100ML
1 INJECTION INTRAVENOUS ONCE
Status: COMPLETED | OUTPATIENT
Start: 2018-11-12 | End: 2018-11-12

## 2018-11-12 RX ORDER — WARFARIN SODIUM 5 MG/1
5 TABLET ORAL DAILY
Status: DISCONTINUED | OUTPATIENT
Start: 2018-11-12 | End: 2018-11-14 | Stop reason: HOSPADM

## 2018-11-12 RX ADMIN — MAGNESIUM SULFATE IN DEXTROSE 1 G: 10 INJECTION, SOLUTION INTRAVENOUS at 15:29

## 2018-11-12 RX ADMIN — CLONIDINE HYDROCHLORIDE 0.1 MG: 0.1 TABLET ORAL at 20:17

## 2018-11-12 RX ADMIN — AMLODIPINE BESYLATE 2.5 MG: 2.5 TABLET ORAL at 11:48

## 2018-11-12 RX ADMIN — ACETAMINOPHEN 650 MG: 325 TABLET, FILM COATED ORAL at 01:02

## 2018-11-12 RX ADMIN — MELATONIN TAB 3 MG 3 MG: 3 TAB at 20:17

## 2018-11-12 RX ADMIN — MONTELUKAST SODIUM 10 MG: 10 TABLET, FILM COATED ORAL at 20:17

## 2018-11-12 RX ADMIN — DULOXETINE HYDROCHLORIDE 40 MG: 20 CAPSULE, DELAYED RELEASE ORAL at 11:49

## 2018-11-12 RX ADMIN — METOCLOPRAMIDE HYDROCHLORIDE 5 MG: 5 TABLET ORAL at 20:17

## 2018-11-12 RX ADMIN — SODIUM CHLORIDE, POTASSIUM CHLORIDE, SODIUM LACTATE AND CALCIUM CHLORIDE: 600; 310; 30; 20 INJECTION, SOLUTION INTRAVENOUS at 15:38

## 2018-11-12 RX ADMIN — METOCLOPRAMIDE HYDROCHLORIDE 5 MG: 5 TABLET ORAL at 11:48

## 2018-11-12 RX ADMIN — REGADENOSON 0.4 MG: 0.08 INJECTION, SOLUTION INTRAVENOUS at 10:00

## 2018-11-12 RX ADMIN — CHOLESTYRAMINE 4 G: 4 POWDER, FOR SUSPENSION ORAL at 17:30

## 2018-11-12 RX ADMIN — INSULIN LISPRO 4 UNITS: 100 INJECTION, SOLUTION INTRAVENOUS; SUBCUTANEOUS at 17:31

## 2018-11-12 RX ADMIN — CLONIDINE HYDROCHLORIDE 0.1 MG: 0.1 TABLET ORAL at 11:49

## 2018-11-12 RX ADMIN — LOSARTAN POTASSIUM 50 MG: 50 TABLET ORAL at 20:17

## 2018-11-12 RX ADMIN — WATER 1 G: 1 INJECTION, SOLUTION INTRAVENOUS at 15:29

## 2018-11-12 RX ADMIN — Medication 10 MILLICURIE: at 07:46

## 2018-11-12 RX ADMIN — Medication 20 MILLICURIE: at 10:05

## 2018-11-12 RX ADMIN — WARFARIN SODIUM 5 MG: 5 TABLET ORAL at 17:30

## 2018-11-12 RX ADMIN — METOCLOPRAMIDE HYDROCHLORIDE 5 MG: 5 TABLET ORAL at 17:30

## 2018-11-12 RX ADMIN — MAGNESIUM SULFATE HEPTAHYDRATE 2 G: 40 INJECTION, SOLUTION INTRAVENOUS at 01:01

## 2018-11-12 RX ADMIN — MOMETASONE FUROATE AND FORMOTEROL FUMARATE DIHYDRATE 2 PUFF: 200; 5 AEROSOL RESPIRATORY (INHALATION) at 17:39

## 2018-11-12 ASSESSMENT — PAIN SCALES - GENERAL
PAINLEVEL_OUTOF10: 0
PAINLEVEL_OUTOF10: 0
PAINLEVEL_OUTOF10: 4
PAINLEVEL_OUTOF10: 0

## 2018-11-12 ASSESSMENT — PAIN DESCRIPTION - PAIN TYPE: TYPE: ACUTE PAIN

## 2018-11-12 ASSESSMENT — PAIN DESCRIPTION - DESCRIPTORS: DESCRIPTORS: HEADACHE

## 2018-11-12 ASSESSMENT — PAIN DESCRIPTION - ONSET: ONSET: ON-GOING

## 2018-11-12 ASSESSMENT — PAIN DESCRIPTION - PROGRESSION: CLINICAL_PROGRESSION: NOT CHANGED

## 2018-11-12 ASSESSMENT — PAIN DESCRIPTION - LOCATION: LOCATION: HEAD

## 2018-11-13 PROBLEM — M12.539 TRAUMATIC ARTHRITIS OF WRIST: Status: ACTIVE | Noted: 2018-11-13

## 2018-11-13 LAB
ANION GAP SERPL CALCULATED.3IONS-SCNC: 12 MMOL/L (ref 7–16)
BASOPHILS ABSOLUTE: 0.03 E9/L (ref 0–0.2)
BASOPHILS RELATIVE PERCENT: 0.2 % (ref 0–2)
BUN BLDV-MCNC: 13 MG/DL (ref 8–23)
CALCIUM SERPL-MCNC: 8.8 MG/DL (ref 8.6–10.2)
CHLORIDE BLD-SCNC: 95 MMOL/L (ref 98–107)
CO2: 26 MMOL/L (ref 22–29)
CREAT SERPL-MCNC: 0.8 MG/DL (ref 0.5–1)
EOSINOPHILS ABSOLUTE: 0.17 E9/L (ref 0.05–0.5)
EOSINOPHILS RELATIVE PERCENT: 1.3 % (ref 0–6)
GFR AFRICAN AMERICAN: >60
GFR NON-AFRICAN AMERICAN: >60 ML/MIN/1.73
GLUCOSE BLD-MCNC: 153 MG/DL (ref 74–99)
HCT VFR BLD CALC: 32 % (ref 34–48)
HEMOGLOBIN: 10.4 G/DL (ref 11.5–15.5)
IMMATURE GRANULOCYTES #: 0.08 E9/L
IMMATURE GRANULOCYTES %: 0.6 % (ref 0–5)
INR BLD: 1.6
LYMPHOCYTES ABSOLUTE: 1.94 E9/L (ref 1.5–4)
LYMPHOCYTES RELATIVE PERCENT: 14.5 % (ref 20–42)
MCH RBC QN AUTO: 28.1 PG (ref 26–35)
MCHC RBC AUTO-ENTMCNC: 32.5 % (ref 32–34.5)
MCV RBC AUTO: 86.5 FL (ref 80–99.9)
METER GLUCOSE: 209 MG/DL (ref 74–99)
METER GLUCOSE: 95 MG/DL (ref 74–99)
MONOCYTES ABSOLUTE: 1.07 E9/L (ref 0.1–0.95)
MONOCYTES RELATIVE PERCENT: 8 % (ref 2–12)
NEUTROPHILS ABSOLUTE: 10.1 E9/L (ref 1.8–7.3)
NEUTROPHILS RELATIVE PERCENT: 75.4 % (ref 43–80)
PDW BLD-RTO: 13.6 FL (ref 11.5–15)
PLATELET # BLD: 228 E9/L (ref 130–450)
PMV BLD AUTO: 12.2 FL (ref 7–12)
POTASSIUM REFLEX MAGNESIUM: 3.8 MMOL/L (ref 3.5–5)
PROTHROMBIN TIME: 17.6 SEC (ref 9.3–12.4)
RBC # BLD: 3.7 E12/L (ref 3.5–5.5)
SODIUM BLD-SCNC: 133 MMOL/L (ref 132–146)
TROPONIN: <0.01 NG/ML (ref 0–0.03)
URINE CULTURE, ROUTINE: NORMAL
WBC # BLD: 13.4 E9/L (ref 4.5–11.5)

## 2018-11-13 PROCEDURE — 6370000000 HC RX 637 (ALT 250 FOR IP): Performed by: INTERNAL MEDICINE

## 2018-11-13 PROCEDURE — 6360000002 HC RX W HCPCS: Performed by: FAMILY MEDICINE

## 2018-11-13 PROCEDURE — 2580000003 HC RX 258: Performed by: FAMILY MEDICINE

## 2018-11-13 PROCEDURE — 97530 THERAPEUTIC ACTIVITIES: CPT

## 2018-11-13 PROCEDURE — 97110 THERAPEUTIC EXERCISES: CPT

## 2018-11-13 PROCEDURE — 6370000000 HC RX 637 (ALT 250 FOR IP): Performed by: FAMILY MEDICINE

## 2018-11-13 PROCEDURE — 2060000000 HC ICU INTERMEDIATE R&B

## 2018-11-13 PROCEDURE — 85610 PROTHROMBIN TIME: CPT

## 2018-11-13 PROCEDURE — 80048 BASIC METABOLIC PNL TOTAL CA: CPT

## 2018-11-13 PROCEDURE — 94640 AIRWAY INHALATION TREATMENT: CPT

## 2018-11-13 PROCEDURE — 36415 COLL VENOUS BLD VENIPUNCTURE: CPT

## 2018-11-13 PROCEDURE — 82962 GLUCOSE BLOOD TEST: CPT

## 2018-11-13 PROCEDURE — 51798 US URINE CAPACITY MEASURE: CPT

## 2018-11-13 PROCEDURE — 2700000000 HC OXYGEN THERAPY PER DAY

## 2018-11-13 PROCEDURE — 85025 COMPLETE CBC W/AUTO DIFF WBC: CPT

## 2018-11-13 RX ORDER — METOPROLOL SUCCINATE 25 MG/1
25 TABLET, EXTENDED RELEASE ORAL DAILY
Status: DISCONTINUED | OUTPATIENT
Start: 2018-11-13 | End: 2018-11-14 | Stop reason: HOSPADM

## 2018-11-13 RX ORDER — SODIUM CHLORIDE 0.9 % (FLUSH) 0.9 %
SYRINGE (ML) INJECTION
Status: DISPENSED
Start: 2018-11-13 | End: 2018-11-14

## 2018-11-13 RX ORDER — IPRATROPIUM BROMIDE AND ALBUTEROL SULFATE 2.5; .5 MG/3ML; MG/3ML
3 SOLUTION RESPIRATORY (INHALATION) 4 TIMES DAILY
Status: DISCONTINUED | OUTPATIENT
Start: 2018-11-13 | End: 2018-11-14 | Stop reason: HOSPADM

## 2018-11-13 RX ADMIN — AMLODIPINE BESYLATE 2.5 MG: 2.5 TABLET ORAL at 09:42

## 2018-11-13 RX ADMIN — METOPROLOL SUCCINATE 25 MG: 25 TABLET, EXTENDED RELEASE ORAL at 17:13

## 2018-11-13 RX ADMIN — INSULIN LISPRO 4 UNITS: 100 INJECTION, SOLUTION INTRAVENOUS; SUBCUTANEOUS at 12:28

## 2018-11-13 RX ADMIN — LEVOTHYROXINE SODIUM 150 MCG: 150 TABLET ORAL at 05:42

## 2018-11-13 RX ADMIN — INSULIN LISPRO 2 UNITS: 100 INJECTION, SOLUTION INTRAVENOUS; SUBCUTANEOUS at 09:43

## 2018-11-13 RX ADMIN — IPRATROPIUM BROMIDE AND ALBUTEROL SULFATE 3 ML: .5; 3 SOLUTION RESPIRATORY (INHALATION) at 19:02

## 2018-11-13 RX ADMIN — LOSARTAN POTASSIUM 50 MG: 50 TABLET ORAL at 21:01

## 2018-11-13 RX ADMIN — CYCLOBENZAPRINE HYDROCHLORIDE 5 MG: 10 TABLET, FILM COATED ORAL at 19:13

## 2018-11-13 RX ADMIN — DULOXETINE HYDROCHLORIDE 40 MG: 20 CAPSULE, DELAYED RELEASE ORAL at 09:48

## 2018-11-13 RX ADMIN — CLONIDINE HYDROCHLORIDE 0.1 MG: 0.1 TABLET ORAL at 21:07

## 2018-11-13 RX ADMIN — MOMETASONE FUROATE AND FORMOTEROL FUMARATE DIHYDRATE 2 PUFF: 200; 5 AEROSOL RESPIRATORY (INHALATION) at 06:02

## 2018-11-13 RX ADMIN — WARFARIN SODIUM 5 MG: 5 TABLET ORAL at 17:13

## 2018-11-13 RX ADMIN — METOCLOPRAMIDE HYDROCHLORIDE 5 MG: 5 TABLET ORAL at 21:01

## 2018-11-13 RX ADMIN — WATER 1 G: 1 INJECTION, SOLUTION INTRAVENOUS at 15:21

## 2018-11-13 RX ADMIN — TIOTROPIUM BROMIDE 18 MCG: 18 CAPSULE ORAL; RESPIRATORY (INHALATION) at 06:02

## 2018-11-13 RX ADMIN — CYCLOBENZAPRINE HYDROCHLORIDE 5 MG: 10 TABLET, FILM COATED ORAL at 05:42

## 2018-11-13 RX ADMIN — MOMETASONE FUROATE AND FORMOTEROL FUMARATE DIHYDRATE 2 PUFF: 200; 5 AEROSOL RESPIRATORY (INHALATION) at 19:04

## 2018-11-13 RX ADMIN — MELATONIN TAB 3 MG 3 MG: 3 TAB at 22:08

## 2018-11-13 RX ADMIN — METOCLOPRAMIDE HYDROCHLORIDE 5 MG: 5 TABLET ORAL at 12:28

## 2018-11-13 RX ADMIN — PANTOPRAZOLE SODIUM 40 MG: 40 TABLET, DELAYED RELEASE ORAL at 05:42

## 2018-11-13 RX ADMIN — GLIMEPIRIDE 2 MG: 2 TABLET ORAL at 05:42

## 2018-11-13 RX ADMIN — MONTELUKAST SODIUM 10 MG: 10 TABLET, FILM COATED ORAL at 21:01

## 2018-11-13 RX ADMIN — METOCLOPRAMIDE HYDROCHLORIDE 5 MG: 5 TABLET ORAL at 09:42

## 2018-11-13 RX ADMIN — SODIUM CHLORIDE, POTASSIUM CHLORIDE, SODIUM LACTATE AND CALCIUM CHLORIDE: 600; 310; 30; 20 INJECTION, SOLUTION INTRAVENOUS at 05:42

## 2018-11-13 RX ADMIN — CHOLESTYRAMINE 4 G: 4 POWDER, FOR SUSPENSION ORAL at 17:13

## 2018-11-13 RX ADMIN — SODIUM CHLORIDE, POTASSIUM CHLORIDE, SODIUM LACTATE AND CALCIUM CHLORIDE: 600; 310; 30; 20 INJECTION, SOLUTION INTRAVENOUS at 21:01

## 2018-11-13 RX ADMIN — CLONIDINE HYDROCHLORIDE 0.1 MG: 0.1 TABLET ORAL at 09:42

## 2018-11-13 RX ADMIN — METOCLOPRAMIDE HYDROCHLORIDE 5 MG: 5 TABLET ORAL at 17:13

## 2018-11-13 ASSESSMENT — PAIN SCALES - GENERAL
PAINLEVEL_OUTOF10: 0

## 2018-11-14 ENCOUNTER — APPOINTMENT (OUTPATIENT)
Dept: NUCLEAR MEDICINE | Age: 78
DRG: 872 | End: 2018-11-14
Payer: MEDICARE

## 2018-11-14 ENCOUNTER — APPOINTMENT (OUTPATIENT)
Dept: GENERAL RADIOLOGY | Age: 78
DRG: 872 | End: 2018-11-14
Payer: MEDICARE

## 2018-11-14 VITALS
HEIGHT: 66 IN | WEIGHT: 189.2 LBS | TEMPERATURE: 98.2 F | BODY MASS INDEX: 30.41 KG/M2 | HEART RATE: 118 BPM | SYSTOLIC BLOOD PRESSURE: 154 MMHG | DIASTOLIC BLOOD PRESSURE: 70 MMHG | RESPIRATION RATE: 18 BRPM | OXYGEN SATURATION: 94 %

## 2018-11-14 LAB
ANION GAP SERPL CALCULATED.3IONS-SCNC: 11 MMOL/L (ref 7–16)
BASOPHILS ABSOLUTE: 0.05 E9/L (ref 0–0.2)
BASOPHILS RELATIVE PERCENT: 0.4 % (ref 0–2)
BUN BLDV-MCNC: 9 MG/DL (ref 8–23)
CALCIUM SERPL-MCNC: 8.6 MG/DL (ref 8.6–10.2)
CHLORIDE BLD-SCNC: 95 MMOL/L (ref 98–107)
CO2: 26 MMOL/L (ref 22–29)
CREAT SERPL-MCNC: 0.7 MG/DL (ref 0.5–1)
EKG ATRIAL RATE: 93 BPM
EKG P AXIS: 4 DEGREES
EKG P-R INTERVAL: 116 MS
EKG Q-T INTERVAL: 408 MS
EKG QRS DURATION: 80 MS
EKG QTC CALCULATION (BAZETT): 507 MS
EKG R AXIS: 55 DEGREES
EKG T AXIS: 53 DEGREES
EKG VENTRICULAR RATE: 93 BPM
EOSINOPHILS ABSOLUTE: 0.12 E9/L (ref 0.05–0.5)
EOSINOPHILS RELATIVE PERCENT: 1 % (ref 0–6)
GFR AFRICAN AMERICAN: >60
GFR NON-AFRICAN AMERICAN: >60 ML/MIN/1.73
GLUCOSE BLD-MCNC: 149 MG/DL (ref 74–99)
HCT VFR BLD CALC: 30.3 % (ref 34–48)
HEMOGLOBIN: 9.9 G/DL (ref 11.5–15.5)
IMMATURE GRANULOCYTES #: 0.06 E9/L
IMMATURE GRANULOCYTES %: 0.5 % (ref 0–5)
INR BLD: 1.9
LYMPHOCYTES ABSOLUTE: 1.9 E9/L (ref 1.5–4)
LYMPHOCYTES RELATIVE PERCENT: 15.7 % (ref 20–42)
MAGNESIUM: 1.2 MG/DL (ref 1.6–2.6)
MCH RBC QN AUTO: 28.3 PG (ref 26–35)
MCHC RBC AUTO-ENTMCNC: 32.7 % (ref 32–34.5)
MCV RBC AUTO: 86.6 FL (ref 80–99.9)
METER GLUCOSE: 145 MG/DL (ref 74–99)
METER GLUCOSE: 148 MG/DL (ref 74–99)
METER GLUCOSE: 156 MG/DL (ref 74–99)
MONOCYTES ABSOLUTE: 1.32 E9/L (ref 0.1–0.95)
MONOCYTES RELATIVE PERCENT: 10.9 % (ref 2–12)
NEUTROPHILS ABSOLUTE: 8.64 E9/L (ref 1.8–7.3)
NEUTROPHILS RELATIVE PERCENT: 71.5 % (ref 43–80)
PDW BLD-RTO: 13.5 FL (ref 11.5–15)
PLATELET # BLD: 256 E9/L (ref 130–450)
PMV BLD AUTO: 11.6 FL (ref 7–12)
POTASSIUM SERPL-SCNC: 3.8 MMOL/L (ref 3.5–5)
PROTHROMBIN TIME: 21 SEC (ref 9.3–12.4)
RBC # BLD: 3.5 E12/L (ref 3.5–5.5)
SODIUM BLD-SCNC: 132 MMOL/L (ref 132–146)
URIC ACID, SERUM: 3.8 MG/DL (ref 2.4–5.7)
WBC # BLD: 12.1 E9/L (ref 4.5–11.5)

## 2018-11-14 PROCEDURE — 83735 ASSAY OF MAGNESIUM: CPT

## 2018-11-14 PROCEDURE — 97535 SELF CARE MNGMENT TRAINING: CPT

## 2018-11-14 PROCEDURE — 85610 PROTHROMBIN TIME: CPT

## 2018-11-14 PROCEDURE — 2580000003 HC RX 258: Performed by: FAMILY MEDICINE

## 2018-11-14 PROCEDURE — A9541 TC99M SULFUR COLLOID: HCPCS | Performed by: RADIOLOGY

## 2018-11-14 PROCEDURE — 6370000000 HC RX 637 (ALT 250 FOR IP): Performed by: INTERNAL MEDICINE

## 2018-11-14 PROCEDURE — 82962 GLUCOSE BLOOD TEST: CPT

## 2018-11-14 PROCEDURE — 97530 THERAPEUTIC ACTIVITIES: CPT

## 2018-11-14 PROCEDURE — 78264 GASTRIC EMPTYING IMG STUDY: CPT

## 2018-11-14 PROCEDURE — 3430000000 HC RX DIAGNOSTIC RADIOPHARMACEUTICAL: Performed by: RADIOLOGY

## 2018-11-14 PROCEDURE — 94640 AIRWAY INHALATION TREATMENT: CPT

## 2018-11-14 PROCEDURE — 85025 COMPLETE CBC W/AUTO DIFF WBC: CPT

## 2018-11-14 PROCEDURE — 84550 ASSAY OF BLOOD/URIC ACID: CPT

## 2018-11-14 PROCEDURE — 6360000002 HC RX W HCPCS: Performed by: FAMILY MEDICINE

## 2018-11-14 PROCEDURE — 71046 X-RAY EXAM CHEST 2 VIEWS: CPT

## 2018-11-14 PROCEDURE — 80048 BASIC METABOLIC PNL TOTAL CA: CPT

## 2018-11-14 PROCEDURE — 6360000002 HC RX W HCPCS: Performed by: INTERNAL MEDICINE

## 2018-11-14 PROCEDURE — 97110 THERAPEUTIC EXERCISES: CPT

## 2018-11-14 PROCEDURE — 2700000000 HC OXYGEN THERAPY PER DAY

## 2018-11-14 PROCEDURE — 6370000000 HC RX 637 (ALT 250 FOR IP): Performed by: FAMILY MEDICINE

## 2018-11-14 PROCEDURE — 36415 COLL VENOUS BLD VENIPUNCTURE: CPT

## 2018-11-14 RX ORDER — HYDRALAZINE HYDROCHLORIDE 20 MG/ML
10 INJECTION INTRAMUSCULAR; INTRAVENOUS EVERY 6 HOURS PRN
Status: DISCONTINUED | OUTPATIENT
Start: 2018-11-14 | End: 2018-11-14 | Stop reason: HOSPADM

## 2018-11-14 RX ORDER — CIPROFLOXACIN 500 MG/1
500 TABLET, FILM COATED ORAL 2 TIMES DAILY
Qty: 1 TABLET | Refills: 0 | Status: SHIPPED | OUTPATIENT
Start: 2018-11-14 | End: 2018-11-24

## 2018-11-14 RX ORDER — METOPROLOL SUCCINATE 25 MG/1
25 TABLET, EXTENDED RELEASE ORAL DAILY
Qty: 30 TABLET | Refills: 3 | Status: SHIPPED | OUTPATIENT
Start: 2018-11-14

## 2018-11-14 RX ORDER — WARFARIN SODIUM 5 MG/1
TABLET ORAL
Qty: 30 TABLET | Refills: 3 | Status: SHIPPED | OUTPATIENT
Start: 2018-11-14 | End: 2020-01-01 | Stop reason: DRUGHIGH

## 2018-11-14 RX ORDER — MAGNESIUM SULFATE 1 G/100ML
1 INJECTION INTRAVENOUS ONCE
Status: COMPLETED | OUTPATIENT
Start: 2018-11-14 | End: 2018-11-14

## 2018-11-14 RX ADMIN — IPRATROPIUM BROMIDE AND ALBUTEROL SULFATE 3 ML: .5; 3 SOLUTION RESPIRATORY (INHALATION) at 06:20

## 2018-11-14 RX ADMIN — IPRATROPIUM BROMIDE AND ALBUTEROL SULFATE 3 ML: .5; 3 SOLUTION RESPIRATORY (INHALATION) at 13:01

## 2018-11-14 RX ADMIN — CLONIDINE HYDROCHLORIDE 0.1 MG: 0.1 TABLET ORAL at 19:36

## 2018-11-14 RX ADMIN — MOMETASONE FUROATE AND FORMOTEROL FUMARATE DIHYDRATE 2 PUFF: 200; 5 AEROSOL RESPIRATORY (INHALATION) at 06:20

## 2018-11-14 RX ADMIN — WARFARIN SODIUM 5 MG: 5 TABLET ORAL at 19:36

## 2018-11-14 RX ADMIN — WATER 1 G: 1 INJECTION, SOLUTION INTRAVENOUS at 15:15

## 2018-11-14 RX ADMIN — MAGNESIUM SULFATE HEPTAHYDRATE 1 G: 1 INJECTION, SOLUTION INTRAVENOUS at 13:17

## 2018-11-14 RX ADMIN — LOSARTAN POTASSIUM 50 MG: 50 TABLET ORAL at 19:36

## 2018-11-14 RX ADMIN — TIOTROPIUM BROMIDE 18 MCG: 18 CAPSULE ORAL; RESPIRATORY (INHALATION) at 06:20

## 2018-11-14 RX ADMIN — HYDRALAZINE HYDROCHLORIDE 10 MG: 20 INJECTION INTRAMUSCULAR; INTRAVENOUS at 16:25

## 2018-11-14 RX ADMIN — Medication 2 MILLICURIE: at 17:41

## 2018-11-14 ASSESSMENT — PAIN SCALES - GENERAL
PAINLEVEL_OUTOF10: 0
PAINLEVEL_OUTOF10: 8
PAINLEVEL_OUTOF10: 0

## 2018-11-14 ASSESSMENT — PAIN DESCRIPTION - LOCATION: LOCATION: ARM;SHOULDER

## 2018-11-14 ASSESSMENT — PAIN DESCRIPTION - PROGRESSION: CLINICAL_PROGRESSION: NOT CHANGED

## 2018-11-14 ASSESSMENT — PAIN DESCRIPTION - ORIENTATION: ORIENTATION: LEFT

## 2018-11-14 ASSESSMENT — PAIN DESCRIPTION - PAIN TYPE: TYPE: ACUTE PAIN

## 2018-11-14 ASSESSMENT — PAIN DESCRIPTION - ONSET: ONSET: ON-GOING

## 2018-11-16 LAB
BLOOD CULTURE, ROUTINE: NORMAL
CULTURE, BLOOD 2: NORMAL

## 2018-11-18 LAB
EKG ATRIAL RATE: 127 BPM
EKG P AXIS: 48 DEGREES
EKG P-R INTERVAL: 126 MS
EKG Q-T INTERVAL: 340 MS
EKG QRS DURATION: 84 MS
EKG QTC CALCULATION (BAZETT): 494 MS
EKG R AXIS: 66 DEGREES
EKG T AXIS: 43 DEGREES
EKG VENTRICULAR RATE: 127 BPM

## 2019-01-01 ENCOUNTER — APPOINTMENT (OUTPATIENT)
Dept: GENERAL RADIOLOGY | Age: 79
DRG: 872 | End: 2019-01-01
Payer: MEDICARE

## 2019-01-01 ENCOUNTER — APPOINTMENT (OUTPATIENT)
Dept: ULTRASOUND IMAGING | Age: 79
DRG: 872 | End: 2019-01-01
Payer: MEDICARE

## 2019-01-01 ENCOUNTER — HOSPITAL ENCOUNTER (INPATIENT)
Age: 79
LOS: 2 days | Discharge: HOME | DRG: 872 | End: 2019-12-31
Attending: EMERGENCY MEDICINE | Admitting: INTERNAL MEDICINE
Payer: MEDICARE

## 2019-01-01 ENCOUNTER — HOSPITAL ENCOUNTER (EMERGENCY)
Age: 79
Discharge: HOME | DRG: 872 | End: 2019-12-27
Payer: MEDICARE

## 2019-01-01 VITALS
HEIGHT: 66 IN | SYSTOLIC BLOOD PRESSURE: 159 MMHG | WEIGHT: 138 LBS | BODY MASS INDEX: 22.18 KG/M2 | HEART RATE: 91 BPM | DIASTOLIC BLOOD PRESSURE: 74 MMHG | TEMPERATURE: 98.1 F | OXYGEN SATURATION: 95 % | RESPIRATION RATE: 18 BRPM

## 2019-01-01 VITALS
TEMPERATURE: 98.3 F | RESPIRATION RATE: 16 BRPM | SYSTOLIC BLOOD PRESSURE: 110 MMHG | HEIGHT: 66 IN | HEART RATE: 74 BPM | OXYGEN SATURATION: 94 % | DIASTOLIC BLOOD PRESSURE: 64 MMHG | BODY MASS INDEX: 20.89 KG/M2 | WEIGHT: 130 LBS

## 2019-01-01 LAB
ALBUMIN SERPL-MCNC: 3.1 G/DL (ref 3.5–5.2)
ALP BLD-CCNC: 80 U/L (ref 35–104)
ALT SERPL-CCNC: 12 U/L (ref 0–32)
ANION GAP SERPL CALCULATED.3IONS-SCNC: 10 MMOL/L (ref 7–16)
ANION GAP SERPL CALCULATED.3IONS-SCNC: 15 MMOL/L (ref 7–16)
ANION GAP SERPL CALCULATED.3IONS-SCNC: 17 MMOL/L (ref 7–16)
ANION GAP SERPL CALCULATED.3IONS-SCNC: 18 MMOL/L (ref 7–16)
ANTISTREPTOLYSIN-O: <20 IU/ML (ref 0–200)
AST SERPL-CCNC: 46 U/L (ref 0–31)
BASOPHILS ABSOLUTE: 0.07 E9/L (ref 0–0.2)
BASOPHILS ABSOLUTE: 0.17 E9/L (ref 0–0.2)
BASOPHILS RELATIVE PERCENT: 0.5 % (ref 0–2)
BASOPHILS RELATIVE PERCENT: 0.9 % (ref 0–2)
BILIRUB SERPL-MCNC: 0.6 MG/DL (ref 0–1.2)
BUN BLDV-MCNC: 14 MG/DL (ref 8–23)
BUN BLDV-MCNC: 14 MG/DL (ref 8–23)
BUN BLDV-MCNC: 15 MG/DL (ref 8–23)
BUN BLDV-MCNC: 19 MG/DL (ref 8–23)
C-REACTIVE PROTEIN: 1.8 MG/DL (ref 0–0.4)
C-REACTIVE PROTEIN: 14.9 MG/DL (ref 0–0.4)
CALCIUM SERPL-MCNC: 8.3 MG/DL (ref 8.6–10.2)
CALCIUM SERPL-MCNC: 8.4 MG/DL (ref 8.6–10.2)
CALCIUM SERPL-MCNC: 9.1 MG/DL (ref 8.6–10.2)
CALCIUM SERPL-MCNC: 9.5 MG/DL (ref 8.6–10.2)
CHLORIDE BLD-SCNC: 101 MMOL/L (ref 98–107)
CHLORIDE BLD-SCNC: 85 MMOL/L (ref 98–107)
CHLORIDE BLD-SCNC: 93 MMOL/L (ref 98–107)
CHLORIDE BLD-SCNC: 98 MMOL/L (ref 98–107)
CO2: 23 MMOL/L (ref 22–29)
CO2: 23 MMOL/L (ref 22–29)
CO2: 24 MMOL/L (ref 22–29)
CO2: 26 MMOL/L (ref 22–29)
CREAT SERPL-MCNC: 1 MG/DL (ref 0.5–1)
CREAT SERPL-MCNC: 1 MG/DL (ref 0.5–1)
CREAT SERPL-MCNC: 1.1 MG/DL (ref 0.5–1)
CREAT SERPL-MCNC: 1.1 MG/DL (ref 0.5–1)
EKG ATRIAL RATE: 104 BPM
EKG P AXIS: 80 DEGREES
EKG P-R INTERVAL: 130 MS
EKG Q-T INTERVAL: 350 MS
EKG QRS DURATION: 72 MS
EKG QTC CALCULATION (BAZETT): 460 MS
EKG R AXIS: 79 DEGREES
EKG T AXIS: 59 DEGREES
EKG VENTRICULAR RATE: 104 BPM
EOSINOPHILS ABSOLUTE: 0.03 E9/L (ref 0.05–0.5)
EOSINOPHILS ABSOLUTE: 0.17 E9/L (ref 0.05–0.5)
EOSINOPHILS RELATIVE PERCENT: 0.2 % (ref 0–6)
EOSINOPHILS RELATIVE PERCENT: 0.9 % (ref 0–6)
GFR AFRICAN AMERICAN: 58
GFR AFRICAN AMERICAN: 58
GFR AFRICAN AMERICAN: >60
GFR AFRICAN AMERICAN: >60
GFR NON-AFRICAN AMERICAN: 48 ML/MIN/1.73
GFR NON-AFRICAN AMERICAN: 48 ML/MIN/1.73
GFR NON-AFRICAN AMERICAN: 54 ML/MIN/1.73
GFR NON-AFRICAN AMERICAN: 54 ML/MIN/1.73
GLUCOSE BLD-MCNC: 106 MG/DL (ref 74–99)
GLUCOSE BLD-MCNC: 128 MG/DL (ref 74–99)
GLUCOSE BLD-MCNC: 133 MG/DL (ref 74–99)
GLUCOSE BLD-MCNC: 276 MG/DL (ref 74–99)
HCT VFR BLD CALC: 30.2 % (ref 34–48)
HCT VFR BLD CALC: 35.3 % (ref 34–48)
HCT VFR BLD CALC: 42.4 % (ref 34–48)
HCT VFR BLD CALC: 43.9 % (ref 34–48)
HEMOGLOBIN: 11.3 G/DL (ref 11.5–15.5)
HEMOGLOBIN: 13.6 G/DL (ref 11.5–15.5)
HEMOGLOBIN: 14 G/DL (ref 11.5–15.5)
HEMOGLOBIN: 9.6 G/DL (ref 11.5–15.5)
IMMATURE GRANULOCYTES #: 0.09 E9/L
IMMATURE GRANULOCYTES %: 0.6 % (ref 0–5)
INR BLD: 1.3
INR BLD: 1.4
INR BLD: 1.4
INR BLD: 1.9
LACTIC ACID: 1.3 MMOL/L (ref 0.5–2.2)
LACTIC ACID: 1.7 MMOL/L (ref 0.5–2.2)
LYMPHOCYTES ABSOLUTE: 1.5 E9/L (ref 1.5–4)
LYMPHOCYTES ABSOLUTE: 2 E9/L (ref 1.5–4)
LYMPHOCYTES RELATIVE PERCENT: 13.6 % (ref 20–42)
LYMPHOCYTES RELATIVE PERCENT: 7.8 % (ref 20–42)
MAGNESIUM: 0.4 MG/DL (ref 1.6–2.6)
MCH RBC QN AUTO: 27.2 PG (ref 26–35)
MCH RBC QN AUTO: 27.3 PG (ref 26–35)
MCH RBC QN AUTO: 27.3 PG (ref 26–35)
MCH RBC QN AUTO: 27.7 PG (ref 26–35)
MCHC RBC AUTO-ENTMCNC: 31.8 % (ref 32–34.5)
MCHC RBC AUTO-ENTMCNC: 31.9 % (ref 32–34.5)
MCHC RBC AUTO-ENTMCNC: 32 % (ref 32–34.5)
MCHC RBC AUTO-ENTMCNC: 32.1 % (ref 32–34.5)
MCV RBC AUTO: 85 FL (ref 80–99.9)
MCV RBC AUTO: 85.1 FL (ref 80–99.9)
MCV RBC AUTO: 85.6 FL (ref 80–99.9)
MCV RBC AUTO: 87 FL (ref 80–99.9)
MONOCYTES ABSOLUTE: 0.94 E9/L (ref 0.1–0.95)
MONOCYTES ABSOLUTE: 1.22 E9/L (ref 0.1–0.95)
MONOCYTES RELATIVE PERCENT: 5.2 % (ref 2–12)
MONOCYTES RELATIVE PERCENT: 8.3 % (ref 2–12)
NEUTROPHILS ABSOLUTE: 11.33 E9/L (ref 1.8–7.3)
NEUTROPHILS ABSOLUTE: 15.9 E9/L (ref 1.8–7.3)
NEUTROPHILS RELATIVE PERCENT: 76.8 % (ref 43–80)
NEUTROPHILS RELATIVE PERCENT: 85.3 % (ref 43–80)
OVALOCYTES: ABNORMAL
PDW BLD-RTO: 13.8 FL (ref 11.5–15)
PDW BLD-RTO: 13.8 FL (ref 11.5–15)
PDW BLD-RTO: 13.9 FL (ref 11.5–15)
PDW BLD-RTO: 14.2 FL (ref 11.5–15)
PHOSPHORUS: 2.9 MG/DL (ref 2.5–4.5)
PLATELET # BLD: 215 E9/L (ref 130–450)
PLATELET # BLD: 253 E9/L (ref 130–450)
PLATELET # BLD: 351 E9/L (ref 130–450)
PLATELET # BLD: 355 E9/L (ref 130–450)
PMV BLD AUTO: 10.8 FL (ref 7–12)
PMV BLD AUTO: 10.9 FL (ref 7–12)
PMV BLD AUTO: 11 FL (ref 7–12)
PMV BLD AUTO: 11 FL (ref 7–12)
POIKILOCYTES: ABNORMAL
POTASSIUM SERPL-SCNC: 3 MMOL/L (ref 3.5–5)
POTASSIUM SERPL-SCNC: 3.9 MMOL/L (ref 3.5–5)
POTASSIUM SERPL-SCNC: 4.3 MMOL/L (ref 3.5–5)
POTASSIUM SERPL-SCNC: 4.6 MMOL/L (ref 3.5–5)
PROTHROMBIN TIME: 14.8 SEC (ref 9.3–12.4)
PROTHROMBIN TIME: 15.8 SEC (ref 9.3–12.4)
PROTHROMBIN TIME: 15.8 SEC (ref 9.3–12.4)
PROTHROMBIN TIME: 22 SEC (ref 9.3–12.4)
RBC # BLD: 3.47 E12/L (ref 3.5–5.5)
RBC # BLD: 4.15 E12/L (ref 3.5–5.5)
RBC # BLD: 4.99 E12/L (ref 3.5–5.5)
RBC # BLD: 5.13 E12/L (ref 3.5–5.5)
SEDIMENTATION RATE, ERYTHROCYTE: 84 MM/HR (ref 0–20)
SEDIMENTATION RATE, ERYTHROCYTE: 86 MM/HR (ref 0–20)
SEDIMENTATION RATE, ERYTHROCYTE: 86 MM/HR (ref 0–20)
SODIUM BLD-SCNC: 128 MMOL/L (ref 132–146)
SODIUM BLD-SCNC: 134 MMOL/L (ref 132–146)
SODIUM BLD-SCNC: 135 MMOL/L (ref 132–146)
SODIUM BLD-SCNC: 136 MMOL/L (ref 132–146)
TEAR DROP CELLS: ABNORMAL
TOTAL PROTEIN: 7.5 G/DL (ref 6.4–8.3)
URIC ACID, SERUM: 5.5 MG/DL (ref 2.4–5.7)
WBC # BLD: 11.1 E9/L (ref 4.5–11.5)
WBC # BLD: 14.7 E9/L (ref 4.5–11.5)
WBC # BLD: 18.7 E9/L (ref 4.5–11.5)
WBC # BLD: 8.5 E9/L (ref 4.5–11.5)

## 2019-01-01 PROCEDURE — 94640 AIRWAY INHALATION TREATMENT: CPT

## 2019-01-01 PROCEDURE — 83605 ASSAY OF LACTIC ACID: CPT

## 2019-01-01 PROCEDURE — 80048 BASIC METABOLIC PNL TOTAL CA: CPT

## 2019-01-01 PROCEDURE — 36415 COLL VENOUS BLD VENIPUNCTURE: CPT

## 2019-01-01 PROCEDURE — 85610 PROTHROMBIN TIME: CPT

## 2019-01-01 PROCEDURE — 93010 ELECTROCARDIOGRAM REPORT: CPT | Performed by: INTERNAL MEDICINE

## 2019-01-01 PROCEDURE — 71046 X-RAY EXAM CHEST 2 VIEWS: CPT

## 2019-01-01 PROCEDURE — 6370000000 HC RX 637 (ALT 250 FOR IP): Performed by: INTERNAL MEDICINE

## 2019-01-01 PROCEDURE — 86140 C-REACTIVE PROTEIN: CPT

## 2019-01-01 PROCEDURE — 6370000000 HC RX 637 (ALT 250 FOR IP): Performed by: SPECIALIST

## 2019-01-01 PROCEDURE — 6360000002 HC RX W HCPCS: Performed by: INTERNAL MEDICINE

## 2019-01-01 PROCEDURE — 1200000000 HC SEMI PRIVATE

## 2019-01-01 PROCEDURE — 2580000003 HC RX 258: Performed by: PHYSICIAN ASSISTANT

## 2019-01-01 PROCEDURE — 73130 X-RAY EXAM OF HAND: CPT

## 2019-01-01 PROCEDURE — 93005 ELECTROCARDIOGRAM TRACING: CPT | Performed by: PHYSICIAN ASSISTANT

## 2019-01-01 PROCEDURE — 99285 EMERGENCY DEPT VISIT HI MDM: CPT

## 2019-01-01 PROCEDURE — 86060 ANTISTREPTOLYSIN O TITER: CPT

## 2019-01-01 PROCEDURE — 6370000000 HC RX 637 (ALT 250 FOR IP): Performed by: EMERGENCY MEDICINE

## 2019-01-01 PROCEDURE — 85025 COMPLETE CBC W/AUTO DIFF WBC: CPT

## 2019-01-01 PROCEDURE — 87040 BLOOD CULTURE FOR BACTERIA: CPT

## 2019-01-01 PROCEDURE — 2580000003 HC RX 258: Performed by: INTERNAL MEDICINE

## 2019-01-01 PROCEDURE — 85651 RBC SED RATE NONAUTOMATED: CPT

## 2019-01-01 PROCEDURE — 6360000002 HC RX W HCPCS: Performed by: EMERGENCY MEDICINE

## 2019-01-01 PROCEDURE — 87088 URINE BACTERIA CULTURE: CPT

## 2019-01-01 PROCEDURE — 99284 EMERGENCY DEPT VISIT MOD MDM: CPT

## 2019-01-01 PROCEDURE — 2580000003 HC RX 258: Performed by: EMERGENCY MEDICINE

## 2019-01-01 PROCEDURE — 85027 COMPLETE CBC AUTOMATED: CPT

## 2019-01-01 PROCEDURE — 84550 ASSAY OF BLOOD/URIC ACID: CPT

## 2019-01-01 PROCEDURE — 80053 COMPREHEN METABOLIC PANEL: CPT

## 2019-01-01 PROCEDURE — 96374 THER/PROPH/DIAG INJ IV PUSH: CPT

## 2019-01-01 PROCEDURE — 83735 ASSAY OF MAGNESIUM: CPT

## 2019-01-01 PROCEDURE — 84100 ASSAY OF PHOSPHORUS: CPT

## 2019-01-01 PROCEDURE — 93971 EXTREMITY STUDY: CPT

## 2019-01-01 RX ORDER — LACTOBACILLUS RHAMNOSUS GG 10B CELL
1 CAPSULE ORAL EVERY 12 HOURS
Qty: 60 CAPSULE | Refills: 0 | Status: SHIPPED | OUTPATIENT
Start: 2019-01-01 | End: 2020-01-01 | Stop reason: ALTCHOICE

## 2019-01-01 RX ORDER — MAGNESIUM SULFATE IN WATER 40 MG/ML
2 INJECTION, SOLUTION INTRAVENOUS ONCE
Status: COMPLETED | OUTPATIENT
Start: 2019-01-01 | End: 2019-01-01

## 2019-01-01 RX ORDER — METOPROLOL SUCCINATE 25 MG/1
25 TABLET, EXTENDED RELEASE ORAL DAILY
Status: DISCONTINUED | OUTPATIENT
Start: 2019-01-01 | End: 2019-01-01 | Stop reason: HOSPADM

## 2019-01-01 RX ORDER — ACETAMINOPHEN 325 MG/1
650 TABLET ORAL EVERY 4 HOURS PRN
Status: DISCONTINUED | OUTPATIENT
Start: 2019-01-01 | End: 2019-01-01 | Stop reason: HOSPADM

## 2019-01-01 RX ORDER — NICOTINE POLACRILEX 4 MG
15 LOZENGE BUCCAL PRN
Status: DISCONTINUED | OUTPATIENT
Start: 2019-01-01 | End: 2019-01-01 | Stop reason: HOSPADM

## 2019-01-01 RX ORDER — LOSARTAN POTASSIUM 50 MG/1
50 TABLET ORAL NIGHTLY
Status: DISCONTINUED | OUTPATIENT
Start: 2019-01-01 | End: 2019-01-01 | Stop reason: HOSPADM

## 2019-01-01 RX ORDER — AMLODIPINE BESYLATE 5 MG/1
5 TABLET ORAL DAILY
Status: DISCONTINUED | OUTPATIENT
Start: 2019-01-01 | End: 2019-01-01 | Stop reason: HOSPADM

## 2019-01-01 RX ORDER — PANTOPRAZOLE SODIUM 40 MG/1
40 TABLET, DELAYED RELEASE ORAL
Status: DISCONTINUED | OUTPATIENT
Start: 2019-01-01 | End: 2019-01-01 | Stop reason: HOSPADM

## 2019-01-01 RX ORDER — MONTELUKAST SODIUM 10 MG/1
10 TABLET ORAL NIGHTLY
Status: DISCONTINUED | OUTPATIENT
Start: 2019-01-01 | End: 2019-01-01 | Stop reason: HOSPADM

## 2019-01-01 RX ORDER — DOXYCYCLINE HYCLATE 100 MG
100 TABLET ORAL 2 TIMES DAILY
Qty: 20 TABLET | Refills: 0 | Status: ON HOLD | OUTPATIENT
Start: 2019-01-01 | End: 2019-01-01 | Stop reason: HOSPADM

## 2019-01-01 RX ORDER — POTASSIUM CHLORIDE 20 MEQ/1
40 TABLET, EXTENDED RELEASE ORAL ONCE
Status: COMPLETED | OUTPATIENT
Start: 2019-01-01 | End: 2019-01-01

## 2019-01-01 RX ORDER — LACTOBACILLUS RHAMNOSUS GG 10B CELL
1 CAPSULE ORAL EVERY 12 HOURS
Status: DISCONTINUED | OUTPATIENT
Start: 2019-01-01 | End: 2019-01-01 | Stop reason: HOSPADM

## 2019-01-01 RX ORDER — 0.9 % SODIUM CHLORIDE 0.9 %
1000 INTRAVENOUS SOLUTION INTRAVENOUS ONCE
Status: COMPLETED | OUTPATIENT
Start: 2019-01-01 | End: 2019-01-01

## 2019-01-01 RX ORDER — NICOTINE 21 MG/24HR
1 PATCH, TRANSDERMAL 24 HOURS TRANSDERMAL DAILY
Status: DISCONTINUED | OUTPATIENT
Start: 2019-01-01 | End: 2019-01-01 | Stop reason: HOSPADM

## 2019-01-01 RX ORDER — AMOXICILLIN AND CLAVULANATE POTASSIUM 875; 125 MG/1; MG/1
1 TABLET, FILM COATED ORAL EVERY 12 HOURS SCHEDULED
Qty: 28 TABLET | Refills: 0 | Status: SHIPPED | OUTPATIENT
Start: 2019-01-01 | End: 2020-01-01

## 2019-01-01 RX ORDER — 0.9 % SODIUM CHLORIDE 0.9 %
500 INTRAVENOUS SOLUTION INTRAVENOUS ONCE
Status: COMPLETED | OUTPATIENT
Start: 2019-01-01 | End: 2019-01-01

## 2019-01-01 RX ORDER — NICOTINE 21 MG/24HR
1 PATCH, TRANSDERMAL 24 HOURS TRANSDERMAL DAILY
Qty: 30 PATCH | Refills: 0 | Status: SHIPPED | OUTPATIENT
Start: 2019-01-01 | End: 2020-01-01 | Stop reason: ALTCHOICE

## 2019-01-01 RX ORDER — AMOXICILLIN AND CLAVULANATE POTASSIUM 875; 125 MG/1; MG/1
1 TABLET, FILM COATED ORAL EVERY 12 HOURS SCHEDULED
Status: DISCONTINUED | OUTPATIENT
Start: 2019-01-01 | End: 2019-01-01 | Stop reason: HOSPADM

## 2019-01-01 RX ORDER — SODIUM CHLORIDE 9 MG/ML
INJECTION, SOLUTION INTRAVENOUS CONTINUOUS
Status: DISCONTINUED | OUTPATIENT
Start: 2019-01-01 | End: 2019-01-01

## 2019-01-01 RX ORDER — WARFARIN SODIUM 5 MG/1
5 TABLET ORAL DAILY
Status: DISCONTINUED | OUTPATIENT
Start: 2019-01-01 | End: 2019-01-01 | Stop reason: HOSPADM

## 2019-01-01 RX ORDER — LEVOTHYROXINE SODIUM 137 UG/1
135 TABLET ORAL DAILY
Status: DISCONTINUED | OUTPATIENT
Start: 2019-01-01 | End: 2019-01-01 | Stop reason: HOSPADM

## 2019-01-01 RX ORDER — CLONIDINE HYDROCHLORIDE 0.1 MG/1
0.1 TABLET ORAL 2 TIMES DAILY
Status: DISCONTINUED | OUTPATIENT
Start: 2019-01-01 | End: 2019-01-01 | Stop reason: HOSPADM

## 2019-01-01 RX ORDER — DULOXETIN HYDROCHLORIDE 20 MG/1
40 CAPSULE, DELAYED RELEASE ORAL DAILY
Status: DISCONTINUED | OUTPATIENT
Start: 2019-01-01 | End: 2019-01-01 | Stop reason: HOSPADM

## 2019-01-01 RX ORDER — DEXTROSE MONOHYDRATE 25 G/50ML
12.5 INJECTION, SOLUTION INTRAVENOUS PRN
Status: DISCONTINUED | OUTPATIENT
Start: 2019-01-01 | End: 2019-01-01 | Stop reason: HOSPADM

## 2019-01-01 RX ORDER — SODIUM CHLORIDE 0.9 % (FLUSH) 0.9 %
10 SYRINGE (ML) INJECTION PRN
Status: DISCONTINUED | OUTPATIENT
Start: 2019-01-01 | End: 2019-01-01 | Stop reason: HOSPADM

## 2019-01-01 RX ORDER — IPRATROPIUM BROMIDE AND ALBUTEROL SULFATE 2.5; .5 MG/3ML; MG/3ML
3 SOLUTION RESPIRATORY (INHALATION) EVERY 6 HOURS PRN
Status: DISCONTINUED | OUTPATIENT
Start: 2019-01-01 | End: 2019-01-01 | Stop reason: HOSPADM

## 2019-01-01 RX ORDER — POTASSIUM CHLORIDE AND SODIUM CHLORIDE 900; 300 MG/100ML; MG/100ML
INJECTION, SOLUTION INTRAVENOUS CONTINUOUS
Status: DISCONTINUED | OUTPATIENT
Start: 2019-01-01 | End: 2019-01-01 | Stop reason: HOSPADM

## 2019-01-01 RX ORDER — SODIUM CHLORIDE 0.9 % (FLUSH) 0.9 %
10 SYRINGE (ML) INJECTION EVERY 12 HOURS SCHEDULED
Status: DISCONTINUED | OUTPATIENT
Start: 2019-01-01 | End: 2019-01-01 | Stop reason: HOSPADM

## 2019-01-01 RX ORDER — DEXTROSE MONOHYDRATE 50 MG/ML
100 INJECTION, SOLUTION INTRAVENOUS PRN
Status: DISCONTINUED | OUTPATIENT
Start: 2019-01-01 | End: 2019-01-01 | Stop reason: HOSPADM

## 2019-01-01 RX ORDER — ONDANSETRON 2 MG/ML
4 INJECTION INTRAMUSCULAR; INTRAVENOUS ONCE
Status: COMPLETED | OUTPATIENT
Start: 2019-01-01 | End: 2019-01-01

## 2019-01-01 RX ADMIN — METOPROLOL SUCCINATE 25 MG: 25 TABLET, EXTENDED RELEASE ORAL at 09:51

## 2019-01-01 RX ADMIN — CLONIDINE HYDROCHLORIDE 0.1 MG: 0.1 TABLET ORAL at 08:52

## 2019-01-01 RX ADMIN — MOMETASONE FUROATE AND FORMOTEROL FUMARATE DIHYDRATE 2 PUFF: 200; 5 AEROSOL RESPIRATORY (INHALATION) at 16:17

## 2019-01-01 RX ADMIN — LOSARTAN POTASSIUM 50 MG: 50 TABLET, FILM COATED ORAL at 21:26

## 2019-01-01 RX ADMIN — PIPERACILLIN AND TAZOBACTAM 3.38 G: 3; .375 INJECTION, POWDER, LYOPHILIZED, FOR SOLUTION INTRAVENOUS at 02:34

## 2019-01-01 RX ADMIN — PIPERACILLIN AND TAZOBACTAM 3.38 G: 3; .375 INJECTION, POWDER, LYOPHILIZED, FOR SOLUTION INTRAVENOUS at 11:48

## 2019-01-01 RX ADMIN — DULOXETINE HYDROCHLORIDE 40 MG: 20 CAPSULE, DELAYED RELEASE ORAL at 09:50

## 2019-01-01 RX ADMIN — MONTELUKAST 10 MG: 10 TABLET, FILM COATED ORAL at 21:26

## 2019-01-01 RX ADMIN — SODIUM CHLORIDE 500 ML: 9 INJECTION, SOLUTION INTRAVENOUS at 15:23

## 2019-01-01 RX ADMIN — Medication 1 CAPSULE: at 18:26

## 2019-01-01 RX ADMIN — PANTOPRAZOLE SODIUM 40 MG: 40 TABLET, DELAYED RELEASE ORAL at 06:31

## 2019-01-01 RX ADMIN — DULOXETINE HYDROCHLORIDE 40 MG: 20 CAPSULE, DELAYED RELEASE ORAL at 08:52

## 2019-01-01 RX ADMIN — MOMETASONE FUROATE AND FORMOTEROL FUMARATE DIHYDRATE 2 PUFF: 200; 5 AEROSOL RESPIRATORY (INHALATION) at 05:59

## 2019-01-01 RX ADMIN — CLONIDINE HYDROCHLORIDE 0.1 MG: 0.1 TABLET ORAL at 21:26

## 2019-01-01 RX ADMIN — CLONIDINE HYDROCHLORIDE 0.1 MG: 0.1 TABLET ORAL at 09:51

## 2019-01-01 RX ADMIN — AMLODIPINE BESYLATE 5 MG: 5 TABLET ORAL at 09:51

## 2019-01-01 RX ADMIN — SODIUM CHLORIDE, PRESERVATIVE FREE 10 ML: 5 INJECTION INTRAVENOUS at 21:28

## 2019-01-01 RX ADMIN — WARFARIN SODIUM 5 MG: 5 TABLET ORAL at 18:27

## 2019-01-01 RX ADMIN — POTASSIUM CHLORIDE 40 MEQ: 20 TABLET, EXTENDED RELEASE ORAL at 08:54

## 2019-01-01 RX ADMIN — PIPERACILLIN SODIUM AND TAZOBACTAM SODIUM 4.5 G: 4; .5 INJECTION, POWDER, LYOPHILIZED, FOR SOLUTION INTRAVENOUS at 17:08

## 2019-01-01 RX ADMIN — PIPERACILLIN AND TAZOBACTAM 3.38 G: 3; .375 INJECTION, POWDER, LYOPHILIZED, FOR SOLUTION INTRAVENOUS at 01:33

## 2019-01-01 RX ADMIN — CLONIDINE HYDROCHLORIDE 0.1 MG: 0.1 TABLET ORAL at 20:38

## 2019-01-01 RX ADMIN — TIOTROPIUM BROMIDE 18 MCG: 18 CAPSULE ORAL; RESPIRATORY (INHALATION) at 05:59

## 2019-01-01 RX ADMIN — PIPERACILLIN AND TAZOBACTAM 3.38 G: 3; .375 INJECTION, POWDER, LYOPHILIZED, FOR SOLUTION INTRAVENOUS at 11:09

## 2019-01-01 RX ADMIN — VANCOMYCIN HYDROCHLORIDE 1250 MG: 10 INJECTION, POWDER, LYOPHILIZED, FOR SOLUTION INTRAVENOUS at 18:27

## 2019-01-01 RX ADMIN — AMLODIPINE BESYLATE 5 MG: 5 TABLET ORAL at 08:51

## 2019-01-01 RX ADMIN — Medication 1 CAPSULE: at 06:31

## 2019-01-01 RX ADMIN — POTASSIUM CHLORIDE AND SODIUM CHLORIDE: 900; 300 INJECTION, SOLUTION INTRAVENOUS at 08:58

## 2019-01-01 RX ADMIN — LEVOTHYROXINE SODIUM 137 MCG: 137 TABLET ORAL at 06:31

## 2019-01-01 RX ADMIN — MAGNESIUM SULFATE HEPTAHYDRATE 2 G: 40 INJECTION, SOLUTION INTRAVENOUS at 08:59

## 2019-01-01 RX ADMIN — LOSARTAN POTASSIUM 50 MG: 50 TABLET, FILM COATED ORAL at 20:38

## 2019-01-01 RX ADMIN — WARFARIN SODIUM 5 MG: 5 TABLET ORAL at 18:26

## 2019-01-01 RX ADMIN — POTASSIUM CHLORIDE AND SODIUM CHLORIDE: 900; 300 INJECTION, SOLUTION INTRAVENOUS at 02:34

## 2019-01-01 RX ADMIN — METOPROLOL SUCCINATE 25 MG: 25 TABLET, EXTENDED RELEASE ORAL at 08:52

## 2019-01-01 RX ADMIN — MONTELUKAST 10 MG: 10 TABLET, FILM COATED ORAL at 20:38

## 2019-01-01 RX ADMIN — PIPERACILLIN AND TAZOBACTAM 3.38 G: 3; .375 INJECTION, POWDER, LYOPHILIZED, FOR SOLUTION INTRAVENOUS at 18:52

## 2019-01-01 RX ADMIN — Medication 1 CAPSULE: at 21:26

## 2019-01-01 RX ADMIN — SODIUM CHLORIDE 1000 ML: 9 INJECTION, SOLUTION INTRAVENOUS at 15:27

## 2019-01-01 RX ADMIN — SODIUM CHLORIDE: 9 INJECTION, SOLUTION INTRAVENOUS at 18:22

## 2019-01-01 RX ADMIN — ONDANSETRON 4 MG: 2 INJECTION INTRAMUSCULAR; INTRAVENOUS at 15:28

## 2019-01-01 ASSESSMENT — PAIN DESCRIPTION - ORIENTATION
ORIENTATION: LEFT

## 2019-01-01 ASSESSMENT — PAIN DESCRIPTION - FREQUENCY
FREQUENCY: CONTINUOUS
FREQUENCY: CONTINUOUS

## 2019-01-01 ASSESSMENT — PAIN DESCRIPTION - PAIN TYPE
TYPE: ACUTE PAIN

## 2019-01-01 ASSESSMENT — PAIN - FUNCTIONAL ASSESSMENT
PAIN_FUNCTIONAL_ASSESSMENT: PREVENTS OR INTERFERES SOME ACTIVE ACTIVITIES AND ADLS

## 2019-01-01 ASSESSMENT — PAIN DESCRIPTION - LOCATION
LOCATION: HAND
LOCATION: WRIST
LOCATION: WRIST
LOCATION: HAND

## 2019-01-01 ASSESSMENT — PAIN SCALES - GENERAL
PAINLEVEL_OUTOF10: 10
PAINLEVEL_OUTOF10: 5
PAINLEVEL_OUTOF10: 10
PAINLEVEL_OUTOF10: 6
PAINLEVEL_OUTOF10: 0

## 2019-01-01 ASSESSMENT — PAIN DESCRIPTION - ONSET
ONSET: ON-GOING
ONSET: ON-GOING

## 2019-01-01 ASSESSMENT — PAIN DESCRIPTION - DESCRIPTORS
DESCRIPTORS: ACHING;DISCOMFORT;NAGGING
DESCRIPTORS: CONSTANT;ACHING;THROBBING;BURNING
DESCRIPTORS: ACHING;DISCOMFORT

## 2019-03-22 ENCOUNTER — APPOINTMENT (OUTPATIENT)
Dept: CT IMAGING | Age: 79
End: 2019-03-22
Payer: MEDICARE

## 2019-03-22 ENCOUNTER — HOSPITAL ENCOUNTER (EMERGENCY)
Age: 79
Discharge: HOME | End: 2019-03-22
Attending: EMERGENCY MEDICINE
Payer: MEDICARE

## 2019-03-22 VITALS
RESPIRATION RATE: 16 BRPM | OXYGEN SATURATION: 92 % | WEIGHT: 143.5 LBS | HEIGHT: 66 IN | SYSTOLIC BLOOD PRESSURE: 138 MMHG | TEMPERATURE: 97.6 F | HEART RATE: 84 BPM | BODY MASS INDEX: 23.06 KG/M2 | DIASTOLIC BLOOD PRESSURE: 77 MMHG

## 2019-03-22 DIAGNOSIS — S00.83XA CONTUSION OF FACE, INITIAL ENCOUNTER: Primary | ICD-10-CM

## 2019-03-22 DIAGNOSIS — Z79.01 CHRONIC ANTICOAGULATION: ICD-10-CM

## 2019-03-22 DIAGNOSIS — R79.1 SUPRATHERAPEUTIC INR: ICD-10-CM

## 2019-03-22 LAB
ANION GAP SERPL CALCULATED.3IONS-SCNC: 12 MMOL/L (ref 7–16)
BASOPHILS ABSOLUTE: 0.06 E9/L (ref 0–0.2)
BASOPHILS RELATIVE PERCENT: 0.6 % (ref 0–2)
BUN BLDV-MCNC: 15 MG/DL (ref 8–23)
CALCIUM SERPL-MCNC: 8.3 MG/DL (ref 8.6–10.2)
CHLORIDE BLD-SCNC: 101 MMOL/L (ref 98–107)
CO2: 27 MMOL/L (ref 22–29)
CREAT SERPL-MCNC: 0.8 MG/DL (ref 0.5–1)
EOSINOPHILS ABSOLUTE: 0.13 E9/L (ref 0.05–0.5)
EOSINOPHILS RELATIVE PERCENT: 1.4 % (ref 0–6)
GFR AFRICAN AMERICAN: >60
GFR NON-AFRICAN AMERICAN: >60 ML/MIN/1.73
GLUCOSE BLD-MCNC: 272 MG/DL (ref 74–99)
HCT VFR BLD CALC: 35.9 % (ref 34–48)
HEMOGLOBIN: 11.6 G/DL (ref 11.5–15.5)
IMMATURE GRANULOCYTES #: 0.04 E9/L
IMMATURE GRANULOCYTES %: 0.4 % (ref 0–5)
INR BLD: 5.4
LYMPHOCYTES ABSOLUTE: 2.57 E9/L (ref 1.5–4)
LYMPHOCYTES RELATIVE PERCENT: 27.3 % (ref 20–42)
MCH RBC QN AUTO: 27.8 PG (ref 26–35)
MCHC RBC AUTO-ENTMCNC: 32.3 % (ref 32–34.5)
MCV RBC AUTO: 86.1 FL (ref 80–99.9)
MONOCYTES ABSOLUTE: 0.57 E9/L (ref 0.1–0.95)
MONOCYTES RELATIVE PERCENT: 6 % (ref 2–12)
NEUTROPHILS ABSOLUTE: 6.06 E9/L (ref 1.8–7.3)
NEUTROPHILS RELATIVE PERCENT: 64.3 % (ref 43–80)
PDW BLD-RTO: 13.5 FL (ref 11.5–15)
PLATELET # BLD: 309 E9/L (ref 130–450)
PMV BLD AUTO: 10.2 FL (ref 7–12)
POTASSIUM SERPL-SCNC: 4.3 MMOL/L (ref 3.5–5)
PROTHROMBIN TIME: 61.5 SEC (ref 9.3–12.4)
RBC # BLD: 4.17 E12/L (ref 3.5–5.5)
SODIUM BLD-SCNC: 140 MMOL/L (ref 132–146)
WBC # BLD: 9.4 E9/L (ref 4.5–11.5)

## 2019-03-22 PROCEDURE — 6360000002 HC RX W HCPCS: Performed by: PHYSICIAN ASSISTANT

## 2019-03-22 PROCEDURE — 70450 CT HEAD/BRAIN W/O DYE: CPT

## 2019-03-22 PROCEDURE — 99284 EMERGENCY DEPT VISIT MOD MDM: CPT

## 2019-03-22 PROCEDURE — 80048 BASIC METABOLIC PNL TOTAL CA: CPT

## 2019-03-22 PROCEDURE — 70486 CT MAXILLOFACIAL W/O DYE: CPT

## 2019-03-22 PROCEDURE — 36415 COLL VENOUS BLD VENIPUNCTURE: CPT

## 2019-03-22 PROCEDURE — 90471 IMMUNIZATION ADMIN: CPT | Performed by: PHYSICIAN ASSISTANT

## 2019-03-22 PROCEDURE — 90715 TDAP VACCINE 7 YRS/> IM: CPT | Performed by: PHYSICIAN ASSISTANT

## 2019-03-22 PROCEDURE — 6370000000 HC RX 637 (ALT 250 FOR IP): Performed by: EMERGENCY MEDICINE

## 2019-03-22 PROCEDURE — 72125 CT NECK SPINE W/O DYE: CPT

## 2019-03-22 PROCEDURE — 85025 COMPLETE CBC W/AUTO DIFF WBC: CPT

## 2019-03-22 PROCEDURE — 85610 PROTHROMBIN TIME: CPT

## 2019-03-22 RX ORDER — LEVOTHYROXINE SODIUM 137 UG/1
135 TABLET ORAL DAILY
COMMUNITY

## 2019-03-22 RX ORDER — PHYTONADIONE 5 MG/1
5 TABLET ORAL ONCE
Status: COMPLETED | OUTPATIENT
Start: 2019-03-22 | End: 2019-03-22

## 2019-03-22 RX ADMIN — TETANUS TOXOID, REDUCED DIPHTHERIA TOXOID AND ACELLULAR PERTUSSIS VACCINE, ADSORBED 0.5 ML: 5; 2.5; 8; 8; 2.5 SUSPENSION INTRAMUSCULAR at 13:59

## 2019-03-22 RX ADMIN — PHYTONADIONE 5 MG: 5 TABLET ORAL at 15:42

## 2019-03-22 ASSESSMENT — PAIN DESCRIPTION - DESCRIPTORS: DESCRIPTORS: SORE

## 2019-12-27 NOTE — ED PROVIDER NOTES
[facility]       Department of Emergency Medicine   ED  Provider Note  Admit Date/RoomTime: 12/27/2019  2:23 PM  ED Room: 24/24  MRN: [id number]  Chief Complaint: Wrist Pain (pain left wrist today no known injury)       History of Present Illness   Source of history provided by:  patient. History/Exam Limitations: none. Annmarie Jones is a 67 y.o. female who has a past medical history of:   Past Medical History:   Diagnosis Date    Arthritis     joints, knees, back  djd    Chronic back pain     COPD (chronic obstructive pulmonary disease) ([facility] [place] [address]     Diabetes mellitus ([facility] [address]     GERD (gastroesophageal reflux disease)     H/O cardiovascular stress test 11/12/2018    lexiscan stress test    Hyperlipidemia     Thyroid disease     presents to the ED by private car and is accompanied by family for left hand pain. Patient states that it started a few days ago. No fall or injury. Patient reports that there was some redness and warmth to the area. She denies any abrasions or recent injury to the hand. She has never anything like this before. She denies any fevers or chills. Nothing seems to make it better or worse. Denies any swelling in her upper arm. ROS    Pertinent positives and negatives are stated within HPI, all other systems reviewed and are negative. Past Surgical History:   Procedure Laterality Date    APPENDECTOMY      BREAST SURGERY      cyst     CHOLECYSTECTOMY      COLONOSCOPY      ENDOSCOPY, COLON, DIAGNOSTIC      HYSTERECTOMY      JOINT REPLACEMENT      left knee    KNEE ARTHROPLASTY Right 1/15/2014    TOTAL KNEE ARTHROPLASTY    THYROIDECTOMY     Social History:  reports that she has been smoking. She has a 60.00 pack-year smoking history. She has never used smokeless tobacco. She reports that she does not drink alcohol or use drugs. Family History: family history is not on file. Allergies: Latex;  Codeine; and Morphine    Physical Exam   Oxygen Saturation MCHC 31.9 (L) 32.0 - 34.5 %    RDW 13.8 11.5 - 15.0 fL    Platelets 482 808 - 266 E9/L    MPV 11.0 7.0 - 12.0 fL    Neutrophils % 76.8 43.0 - 80.0 %    Immature Granulocytes % 0.6 0.0 - 5.0 %    Lymphocytes % 13.6 (L) 20.0 - 42.0 %    Monocytes % 8.3 2.0 - 12.0 %    Eosinophils % 0.2 0.0 - 6.0 %    Basophils % 0.5 0.0 - 2.0 %    Neutrophils Absolute 11.33 (H) 1.80 - 7.30 E9/L    Immature Granulocytes # 0.09 E9/L    Lymphocytes Absolute 2.00 1.50 - 4.00 E9/L    Monocytes Absolute 1.22 (H) 0.10 - 0.95 E9/L    Eosinophils Absolute 0.03 (L) 0.05 - 0.50 E9/L    Basophils Absolute 0.07 0.00 - 0.20 G1/Y   Basic Metabolic Panel   Result Value Ref Range    Sodium 135 132 - 146 mmol/L    Potassium 3.9 3.5 - 5.0 mmol/L    Chloride 93 (L) 98 - 107 mmol/L    CO2 24 22 - 29 mmol/L    Anion Gap 18 (H) 7 - 16 mmol/L    Glucose 133 (H) 74 - 99 mg/dL    BUN 14 8 - 23 mg/dL    CREATININE 1.1 (H) 0.5 - 1.0 mg/dL    GFR Non-African American 48 >=60 mL/min/1.73    GFR African American 58     Calcium 9.1 8.6 - 10.2 mg/dL   Lactic Acid, Plasma   Result Value Ref Range    Lactic Acid 1.3 0.5 - 2.2 mmol/L   Protime-INR   Result Value Ref Range    Protime 22.0 (H) 9.3 - 12.4 sec    INR 1.9    Uric acid   Result Value Ref Range    Uric Acid, Serum 5.5 2.4 - 5.7 mg/dL   Sedimentation Rate   Result Value Ref Range    Sed Rate 86 (H) 0 - 20 mm/Hr     Imaging: All Radiology results interpreted by Radiologist unless otherwise noted. US DUP UPPER EXTREMITY LEFT VENOUS   Final Result   1. No evidence of acute venous thrombosis left upper extremity. XR HAND LEFT (MIN 3 VIEWS)   Final Result   No acute findings. ED Course / Medical Decision Making     Medications   0.9 % sodium chloride bolus (0 mLs Intravenous Stopped 12/27/19 1630)        Re-examination:  12/27/19       Time: 1700   Updated on results.      Consult(s):   None    Procedure(s):   none    MDM:   Patient has no evidence for DVT on ultrasound today, x-rays at [place]

## 2019-12-29 PROBLEM — A41.9 SEPSIS (HCC): Status: ACTIVE | Noted: 2019-01-01

## 2019-12-29 NOTE — CONSULTS
[address] [place] [address] [facility]  Consult Note    [address]  [place], [address]  Phone [phone]   [phone]      Admit Date: 2019  2:33 PM  Pt Name: Valentino Patron  MRN: [id number]  : 1940  Reason for Consult:    Chief Complaint   Patient presents with    Other     left wrist swelling and is painful, seen here on friday, was bit by a cat, now more swollen, red and more painful, gowing up her arm     Requesting Physician:  Jo Jett DO  PCP: Clemencia Chapa MD  History Obtained From:  patient  ID consulted for Sepsis ([address] [A41.9]  on hospital day 0  CHIEF COMPLAINT       Chief Complaint   Patient presents with    Other     left wrist swelling and is painful, seen here on friday, was bit by a cat, now more swollen, red and more painful, gowing up her arm     HISTORYOF PRESENT ILLNESS      Valentino Patron is a 67 y.o. female who presents with significant past medical history of  has a past medical history of Arthritis, Chronic back pain, COPD (chronic obstructive pulmonary disease) ([address] Diabetes mellitus ([address] GERD (gastroesophageal reflux disease), H/O cardiovascular stress test, Hyperlipidemia, and Thyroid disease. who presents with   Chief Complaint   Patient presents with    Other     left wrist swelling and is painful, seen here on friday, was bit by a cat, now more swollen, red and more painful, gowing up her arm     ED TRIAGEVITALS  BP: 139/82, Temp: 98.1 °F (36.7 °C), Pulse: 105, Resp: 18, SpO2: 96 %  HPI  PT WAS IN ER ON  FOR HAND SWELLING  WBC14.7  HAND XRY NAP   US NEG FOR DVT  D/C WITH [facility]   SHE RETURNS TODAY DUE TO UE NOT BETTER WITH INC SWELLING AND PAIN. SHE HAD F/C/HA/N/V/D  SHE FEELS HER left wrist and hand swelling/redness DUE post cat scratch   beginning 1 week ago. SHE HAS BEEN COMPLCIANT WITH DOXY. SINCE ADMISSION SHE FEELS HER HAND IS BETTER LESS SWELLING AND REDNESS.   PT HAD FLU VACCINE    REVIEW OF SYSTEMS    (2-9 Vitals:    Vitals:    12/29/19 1428 12/29/19 1631 12/29/19 1724 12/29/19 1800   BP: 136/87  (!) 155/72 139/82   Pulse: 116 102 102 105   Resp: 16 20 20 18   Temp: 97.9 °F (36.6 °C)  97.9 °F (36.6 °C) 98.1 °F (36.7 °C)   TempSrc: Oral  Oral Oral   SpO2: 95% 94% 95% 96%   Weight: 130 lb (59 kg)      Height: 5' 6\" (1.676 m)        Physical Exam   Constitutional/General: Alert and oriented, NAD  Head: NC/AT  Eyes: PERRL, EOMI  Mouth: Normal mucosa, no thrush   Neck: Supple, full ROM,    Pulmonary: Lungs  RALES to auscultation bilaterally. Not in respiratory distress  Cardiovascular:  Regular rate and rhythm, no murmurs, gallops, or rubs. Abdomen: Soft, + BS. No distension. Nontender. Extremities: Moves all extremities x 4. Warm and well perfused, LHAND THENAR SWELLING ERYTHEMA EXTENDING TO MID FOREARM  TO TOUCH    Pulses:  Distal pulses intact  Skin: Warm and dry without rash  Neurologic:    No focal deficits  Psych: Normal Affect     DIAGNOSTICRESULTS   RADIOLOGY:   Xr Chest Standard (2 Vw)    Result Date: 12/29/2019  LOCATION: 200 EXAM: XR CHEST (2 VW) COMPARISON: 2018 HISTORY: Shortness of breath cough TECHNIQUE: PA and lateral  views of the chest were obtained. FINDINGS:  SUPPORT DEVICES: None. LUNGS: Clear with no areas of consolidation. PLEURA: No effusions or pneumothorax. LUNG VOLUMES: Satisfactory inspirator effort. MEDIASTINAL STRUCTURES: No lymphadenopathy. HEART SIZE: Normal. UPPER ABDOMEN: Unremarkable. BONES AND SOFT TISSUES: No fracture or soft tissue abnormality. 1. No active cardiopulmonary disease. Xr Hand Left (min 3 Views)    Result Date: 12/27/2019  LOCATION: 200 EXAM: XR HAND LEFT (MIN 3 VIEWS) COMPARISON: None HISTORY: Pain TECHNIQUE: 3 views of the left hand were obtained. FINDINGS: The bones, joints, and soft tissues are within normal limits. There is no evidence of fracture or malalignment. No radiopaque foreign body noted. No acute findings.     Us Dup

## 2019-12-29 NOTE — ED PROVIDER NOTES
Card/Pulm:  Rhythm: irregularly irregular. Heart Sounds: Normal S1, S2. unlabored breathing. Capillary Refill: normal.  Radial Pulse:  present 2+. Skin:  Warm. Counseling: The emergency provider has spoken with the patient and discussed todays results, in addition to providing specific details for the plan of care and counseling regarding the diagnosis and prognosis. Questions are answered at this time and they are agreeable with the plan.      ------------------------ IMPRESSION AND DISPOSITION -------------------------------    IMPRESSION  1. Cat bite of left wrist with infection, initial encounter    2. Hyponatremia    3. Septicemia ([address]    4. Chronic anticoagulation    5. Cellulitis of left upper extremity    6. Non-intractable vomiting with nausea, unspecified vomiting type    7. Tobacco abuse    8. Cough        DISPOSITION  Disposition: Admit    Patient condition is stable       Maru Baptiste PA-C  12/29/19 1612    ATTENDING PROVIDER ATTESTATION:     Esthela Holder presented to the emergency department for evaluation of Other (left wrist swelling and is painful, seen here on friday, was bit by a cat, now more swollen, red and more painful, gowing up her arm)    I have reviewed and discussed the case, including pertinent history (medical, surgical, family and social) and exam findings with the Resident and the Nurse assigned to Esthela Holder. I have personally performed and/or participated in the history, exam, medical decision making, and procedures and agree with all pertinent clinical information. I have reviewed my findings and recommendations with Esthela Holder and members of family present at the time of disposition. MDM: Patient failing outpatient treatment of her cat bite.   Broad-spectrum antibiotics initiated, she is been on to tolerate p.o. well, she is volume resuscitated, spoke with medicine patient will be admitted    Spoke with Dr. Mayo mendenhall for [facility] (Medicine). Discussed case. They will admit this patient. My findings/plan: The primary encounter diagnosis was Cat bite of left wrist with infection, initial encounter. Diagnoses of Hyponatremia, Septicemia ([facility] [address] Chronic anticoagulation, Cellulitis of left upper extremity, Non-intractable vomiting with nausea, unspecified vomiting type, Tobacco abuse, and Cough were also pertinent to this visit.   Current Discharge Medication List        DO Mindi Vizcarra DO  12/29/19 2878

## 2019-12-29 NOTE — H&P
obtained. FINDINGS: The bones, joints, and soft tissues are within normal limits. There is no evidence of fracture or malalignment. No radiopaque foreign body noted. No acute findings. Us Dup Upper Extremity Left Venous    Result Date: 12/27/2019  LOCATION: 200 EXAM: US DUP UPPER EXTREMITY LEFT VENOUS COMPARISON: None HISTORY:  pain, swelling pain, swelling Technique:  Multiple Realtime, grayscale and color-flow spectral waveform analysis Doppler ultrasound images of the left upper extremity was performed. Evaluation was obtained from the forearm to the neck. Compression technique as well as doppler and color flow images were obtained. Findings: Normal flow and color flow patterns were identified with normal changes to  venous augmentation. Complete compression of the venous system was also noted signifying no evidence of acute vein thrombosis. No soft tissue mass or cyst is identified. 1. No evidence of acute venous thrombosis left upper extremity. Review of Systems: All bolded are positive, all others are negative. General:  Fever, chills, diaphoresis, fatigue, malaise, night sweats, weight loss  Psychological:  Anxiety, disorientation, hallucinations. ENT:  Epistaxis, headaches, vertigo, visual changes. Cardiovascular:  Chest pain, irregular heartbeats, palpitations, paroxysmal nocturnal dyspnea. Respiratory:  Shortness of breath, coughing, sputum production, hemoptysis, wheezing, orthopnea.   Gastrointestinal:  Nausea, vomiting, diarrhea, heartburn, constipation, abdominal pain, hematemesis, hematochezia, melena, acholic stools  Genito-Urinary:  Dysuria, urgency, frequency, hematuria  Musculoskeletal:  Joint pain, joint stiffness, joint swelling, muscle pain  Neurology:  Headache, focal neurological deficits, weakness, numbness, paresthesia  Derm:  Rashes, ulcers, excoriations, bruising  Extremities:  Decreased ROM, peripheral edema, mottling    Past Medical Hx:  Past Medical History: Diagnosis Date    Arthritis     joints, knees, back  djd    Chronic back pain     COPD (chronic obstructive pulmonary disease) (HCC)     Diabetes mellitus (HCC)     GERD (gastroesophageal reflux disease)     H/O cardiovascular stress test 11/12/2018    lexiscan stress test    Hyperlipidemia     Thyroid disease        Past Surgical Hx:  Past Surgical History:   Procedure Laterality Date    APPENDECTOMY      BREAST SURGERY      cyst     CHOLECYSTECTOMY      COLONOSCOPY      ENDOSCOPY, COLON, DIAGNOSTIC      HYSTERECTOMY      JOINT REPLACEMENT      left knee    KNEE ARTHROPLASTY Right 1/15/2014    TOTAL KNEE ARTHROPLASTY    THYROIDECTOMY         Family Hx:  History reviewed. No pertinent family history. Home Medications:  Prior to Admission medications    Medication Sig Start Date End Date Taking?  Authorizing Provider   doxycycline hyclate (VIBRA-TABS) 100 MG tablet Take 1 tablet by mouth 2 times daily for 10 days 12/27/19 1/6/20 Yes ROSAMARIA Meyer   levothyroxine (SYNTHROID) 137 MCG tablet Take 135 mcg by mouth Daily   Yes Historical Provider, MD   warfarin (COUMADIN) 5 MG tablet 1 tab daily keep inr between 2-3   PT/INR WEEKLY 11/14/18  Yes Jared Toure MD   metoprolol succinate (TOPROL XL) 25 MG extended release tablet Take 1 tablet by mouth daily 11/14/18  Yes Jared Toure MD   ipratropium-albuterol (DUONEB) 0.5-2.5 (3) MG/3ML SOLN nebulizer solution Inhale 3 mLs into the lungs every 6 hours as needed for Shortness of Breath 12/14/17  Yes Bipin Dominguez MD   amLODIPine (NORVASC) 5 MG tablet Take 1 tablet by mouth daily 12/14/17  Yes Bipin Dominguez MD   DULoxetine HCl 40 MG CPEP Take 40 mg by mouth daily   Yes Historical Provider, MD   colestipol (COLESTID) 1 g tablet Take 2 g by mouth every evening    Yes Historical Provider, MD   cloNIDine (CATAPRES) 0.1 MG tablet Take 1 tablet by mouth 2 times daily 2/24/16  Yes Bipin Dominguez MD   glimepiride (AMARYL) 2 MG tablet Take 2 mg by mouth every morning (before breakfast)   Yes Historical Provider, MD   omeprazole (PRILOSEC) 40 MG capsule Take 40 mg by mouth daily   Yes Historical Provider, MD   fluticasone-salmeterol (ADVAIR) 250-50 MCG/DOSE AEPB Inhale 1 puff into the lungs every 12 hours   Yes Historical Provider, MD   tiotropium (SPIRIVA) 18 MCG inhalation capsule Inhale 18 mcg into the lungs daily. Yes Historical Provider, MD   losartan (COZAAR) 50 MG tablet Take 50 mg by mouth nightly. Pt states takes to protect kidneys   Yes Historical Provider, MD   montelukast (SINGULAIR) 10 MG tablet Take 10 mg by mouth nightly. Yes Historical Provider, MD   OXYGEN Uses oxygen at night at 2 liters   Yes Historical Provider, MD   acetaminophen 650 MG TABS Take 650 mg by mouth every 6 hours as needed  Patient taking differently: Take 500 mg by mouth every 6 hours as needed  2/24/16   Bonnie Jacques MD       Allergies:  Latex;  Codeine; and Morphine    Social Hx:  Social History     Socioeconomic History    Marital status:      Spouse name: Not on file    Number of children: Not on file    Years of education: Not on file    Highest education level: Not on file   Occupational History    Not on file   Social Needs    Financial resource strain: Not on file    Food insecurity:     Worry: Not on file     Inability: Not on file    Transportation needs:     Medical: Not on file     Non-medical: Not on file   Tobacco Use    Smoking status: Current Some Day Smoker     Packs/day: 1.00     Years: 60.00     Pack years: 60.00    Smokeless tobacco: Never Used   Substance and Sexual Activity    Alcohol use: No    Drug use: No     Comment: ready to quit    Sexual activity: Not on file   Lifestyle    Physical activity:     Days per week: Not on file     Minutes per session: Not on file    Stress: Not on file   Relationships    Social connections:     Talks on phone: Not on file     Gets together: Not on file     Attends [employer] service: Not on file     Active member of club or organization: Not on file     Attends meetings of clubs or organizations: Not on file     Relationship status: Not on file    Intimate partner violence:     Fear of current or ex partner: Not on file     Emotionally abused: Not on file     Physically abused: Not on file     Forced sexual activity: Not on file   Other Topics Concern    Not on file   Social History Narrative    Not on file       Vitals:  /87   Pulse 102   Temp 97.9 °F (36.6 °C) (Oral)   Resp 20   Ht 5' 6\" (1.676 m)   Wt 130 lb (59 kg)   SpO2 94%   BMI 20.98 kg/m²     Physical Exam:  General: Awake, alert, oriented to person, place, time, and purpose, appears stated age, cooperative, no acute distress   Head: Normocephalic, atraumatic  Eyes: Conjunctivae/corneas clear, Sclera non icteric  Mouth: Mucous membranes moist  Neck: No JVD, no adenopathy, neck is supple, trachea is midline  Back: ROM normal  Lungs: Clear to auscultation bilaterally. No retractions or use of accessory muscles. No vocal fremitus. No rhonchi, crackle or rale. Heart: Regular rate and regular rhythm, no murmur, normal S1, S2  Abdomen: Soft, non-tender; bowel sounds normal; no masses, no organomegaly  Extremities: left upper extremity edema and erythema and pain, extremities atraumatic, no cyanosis, no clubbing, 2+ pedal pulses palpated  Skin: Normal color, normal texture, normal turgor, no rashes, no lesions  Neurologic: 5/5 muscle strength throughout, Normal muscle tone throughout, EOMI, face symmetric, equal facial muscle strength bilaterally, hearing intact,  tongue midline, Speech appropriate without slurring. Assessment and Plan     Patient is a 67 y.o. female who presented with left upper extremity cellulitis following cat scratch. Assessment/Plan  1. Cellulitis following cat scratch  2. Hyponatremia  3. DM2  4. Atrial fibrillation on warfarin   5. Mild pulmonary hypertension  6.  COPD on 2 liters at night  7. Hypothyroidism  8. HLD  9. GERD  10. Current tobacco abuse    Patient was previously treated with doxycycline about 2 days ago. However left upper extremity cellulitis progressed. In the ED she received IV Zosyn. And she responded very well. Swelling has improved. Cultures have been obtained. Consult ID. Placed on IV fluids. Continue warfarin for chronic anticoagulation. See orders for further plan of care. This patient was discussed with Dr. Glenn Ariza.     [place], [place]  5:07 PM  12/29/2019

## 2019-12-30 NOTE — PROGRESS NOTES
,000 CFU/mL  Mixed darryn isolated. Further workup and sensitivity testing  is not routinely indicated and will not be performed. Mixed darryn isolated includes:  Mixed gram positive organisms  Mixed gram negative rods     02/17/2016 Growth not present  Final     MRSA Culture Only   Date Value Ref Range Status   02/19/2016 Methicillin resistant Staph aureus not isolated  Final         Lab Results   Component Value Date    BLOODCULT2 5 Days- no growth 11/11/2018       ASSESSMENT: ON ADMISSION PT HAD   Chief Complaint   Patient presents with    Other     left wrist swelling and is painful, seen here on friday, was bit by a cat, now more swollen, red and more painful, gowing up her arm      · LEUKOCYTOSIS RESOLVED  · LEFT UE CELLULITIS FAILED ORALS  · CAT SCRATCH     PLAN:  CONT ATBX IV ONE MORE DAY   POSS ORAL IN AM   SPOKE WITH DR ARELLANO AND FAMILY POC        · Continue  · Check final cultures  · Monitor labs    Imaging and labs were reviewed per medical records and any ID pertinent labs were addressed with the patient. The patient was educated about the diagnosis, prognosis, indications, risks and benefits of treatment. An opportunity to ask questions was given to the patient and questions were answered.         Electronically signed by Jonny Joaquin MD on 12/30/2019 at 2:12 PM

## 2019-12-30 NOTE — PLAN OF CARE
Problem: Falls - Risk of:  Goal: Will remain free from falls  Description  Will remain free from falls  Outcome: Met This Shift     Problem: Falls - Risk of:  Goal: Absence of physical injury  Description  Absence of physical injury  Outcome: Met This Shift     Problem: Pain:  Goal: Pain level will decrease  Description  Pain level will decrease  Outcome: Met This Shift     Problem: Pain:  Goal: Control of acute pain  Description  Control of acute pain  Outcome: Met This Shift     Problem: Pain:  Goal: Control of chronic pain  Description  Control of chronic pain  Outcome: Met This Shift     Problem: Discharge Planning:  Goal: Discharged to appropriate level of care  Description  Discharged to appropriate level of care  Outcome: Met This Shift     Problem: Infection, Septic Shock:  Goal: Will show no infection signs and symptoms  Description  Will show no infection signs and symptoms  Outcome: Met This Shift     Problem: Serum Glucose Level - Abnormal:  Goal: Ability to maintain appropriate glucose levels will improve  Description  Ability to maintain appropriate glucose levels will improve  Outcome: Met This Shift

## 2019-12-30 NOTE — PROGRESS NOTES
Internal Medicine Progress Note    Isauro Dyer. Ruby Baez., & [address] Ruby Baez., F.A.C.O.I. Iris Key D.O., F.A.C.O.I. Primary Care Physician: Sneha Chambers MD   Admitting Physician:  Isabel Feliciano DO  Admission date and time: 12/29/2019  2:33 PM    Room:  [facility]0333-    Patient Name: Flaquita Alvarado  MRN: [id number]    Date of Service: 12/30/2019     Subjective: Alistair Toribio presented through [facility] emergency department yesterday for evaluation of a left upper extremity wound related to cat scratch. She was placed on empiric antibiotic therapy and has had significant improvement in her symptoms. She states the swelling and erythema have improved dramatically. She is extremely anxious for discharge home. Review of System: HEENT:  Tony ear pain, sore throat, sinus or eye problems  Cardiovascular:   Denies any chest pain, irregular heartbeats, or palpitations. Respiratory:   Denies shortness of breath, coughing, sputum production, hemoptysis, or wheezing. Gastrointestinal:   Denies nausea, vomiting, diarrhea, or constipation. Denies any abdominal pain. Extremities:   Denies any lower extremity swelling or edema. Neurology:    Denies any headache or focal neurological deficits. No weakness or paresthesia. Derm:    Left upper extremity cat scratch with decreased erythema and swelling when compared to yesterday's examination. Genitourinary:    Denies any urgency, frequency, hematuria. Voiding without difficulty. Musculoskeletal:  Denies myalgias, joint complaints or back pain      Physical Exam:  No intake/output data recorded. Blood pressure (!) 142/78, pulse 98, temperature 97.7 °F (36.5 °C), temperature source Axillary, resp. rate 18, height 5' 6\" (1.676 m), weight 130 lb (59 kg), SpO2 94 %. HEENT:    PERRLA. EOMI. Sclera clear. Buccal mucosa moist.  Neck:    Supple. Trachea midline. No thyromegaly. No JVD. No bruits.   Heart:    Rhythm 0.17 12/29/2019    BASOSABS 0.17 12/29/2019     BMP:    Lab Results   Component Value Date     12/30/2019    K 3.0 12/30/2019    K 3.8 11/13/2018    CL 98 12/30/2019    CO2 23 12/30/2019    BUN 15 12/30/2019    LABALBU 3.1 12/29/2019    LABALBU 4.0 10/03/2011    CREATININE 1.0 12/30/2019    CALCIUM 8.4 12/30/2019    GFRAA >60 12/30/2019    LABGLOM 54 12/30/2019    GLUCOSE 106 12/30/2019    GLUCOSE 158 10/03/2011       Assessment:   1. Sepsis secondary to cellulitis of the left upper extremity as a result of a cat scratch  2. Multiple electrolyte derangements including hypokalemia and hypomagnesemia  3. Non-insulin-dependent diabetes mellitus type 2  4. Chronic atrial fibrillation on subtherapeutic Coumadin therapy  5. Mild pulmonary hypertension  6. 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 Hypothyroidism  8. Hyperlipidemia  9. Gastroesophageal reflux disease  10. Current tobacco abuse    Plan:   The patient has responded dramatically to IV antibiotics and her overall clinical disposition has improved. We are awaiting final cultures. Electrolyte derangements will be addressed. Gentle IV fluid resuscitation is being undertaken. Chronic comorbidities are being monitored. She will be acceptable for discharge home once antibiotics have been determined by the infectious disease team.  Continue current therapy. See orders for further plan of care. More than 50% of my time was spent at the bedside counseling/coordinating care with the patient and/or family with face to face contact. This time was spent reviewing notes and laboratory data as well as instructing and counseling the patient. Time I spent with the family or surrogate(s) is included only if the patient was incapable of providing the necessary information or participating in medical decisions.  I also discussed the differential diagnosis and all of the proposed management plans with the patient and individuals accompanying the patient. I am readily available for any further decision-making and intervention.        Jessie Lee DO, F.A.C.O.I.  12/30/2019  8:45 AM

## 2019-12-31 NOTE — PLAN OF CARE
Problem: Falls - Risk of:  Goal: Will remain free from falls  Description  Will remain free from falls  12/31/2019 0615 by Iliana Mello RN  Outcome: Met This Shift     Problem: Falls - Risk of:  Goal: Absence of physical injury  Description  Absence of physical injury  12/31/2019 0615 by Iliana Mello RN  Outcome: Met This Shift     Problem: Pain:  Goal: Pain level will decrease  Description  Pain level will decrease  Outcome: Met This Shift     Problem: Pain:  Goal: Control of acute pain  Description  Control of acute pain  Outcome: Met This Shift     Problem: Pain:  Goal: Control of chronic pain  Description  Control of chronic pain  Outcome: Met This Shift     Problem: Infection, Septic Shock:  Goal: Will show no infection signs and symptoms  Description  Will show no infection signs and symptoms  Outcome: Met This Shift     Problem: Serum Glucose Level - Abnormal:  Goal: Ability to maintain appropriate glucose levels will improve  Description  Ability to maintain appropriate glucose levels will improve  Outcome: Met This Shift

## 2019-12-31 NOTE — DISCHARGE SUMMARY
Internal Medicine  Discharge Summary    NAME: Jennifer April  :  1940  MRN:  [id number]  Melo Bauer MD  ADMITTED: 2019      DISCHARGED: 19    ADMITTING PHYSICIAN: Marcellus Simms DO    CONSULTANT(S):   IP CONSULT TO PRIMARY CARE PROVIDER  IP CONSULT TO INFECTIOUS DISEASES     ADMITTING DIAGNOSIS:   Sepsis ([address] [A41.9]     DISCHARGE DIAGNOSES:   1. Sepsis secondary to cellulitis of the left upper extremity as a result of a cat scratch  2. Multiple electrolyte derangements including hypokalemia and hypomagnesemia  3. Non-insulin-dependent diabetes mellitus type 2  4. Chronic atrial fibrillation on subtherapeutic Coumadin therapy  5. Mild pulmonary hypertension  6. 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 Hypothyroidism  8. Hyperlipidemia  9. Gastroesophageal reflux disease  10. Current tobacco abuse    BRIEF HISTORY OF PRESENT ILLNESS:   Patient is 71-year-old female who presented to the ED due to left upper extremity cellulitis which began about 2 weeks ago. She states that she was at her friend's house about 2 weeks ago and was playing with her cat. The cat did scratch her multiple times on her left hand and forearm. She states following that she developed swelling that was mild however it progressed in the last few days. She was seen at urgent care and placed on doxycycline. However swelling and pain increased and she presented to the ED for further evaluation. She admits to fever. She also admits nausea and emesis with resultant inability to tolerate oral intake.   She denies previous occurrence.       LABS[de-identified]  Lab Results   Component Value Date    WBC 8.5 2019    HGB 9.6 (L) 2019    HCT 30.2 (L) 2019     2019     2019    K 4.3 2019     2019    CREATININE 1.0 2019    BUN 14 2019    CO2 23 2019    GLUCOSE 128 (H) 2019    ALT 12 2019    AST 46 (H) 2019    INR 1.4 performed. Evaluation was obtained from the forearm to the neck. Compression technique as well as doppler and color flow images were obtained. Findings: Normal flow and color flow patterns were identified with normal changes to  venous augmentation. Complete compression of the venous system was also noted signifying no evidence of acute vein thrombosis. No soft tissue mass or cyst is identified. 1. No evidence of acute venous thrombosis left upper extremity. HOSPITAL COURSE:   [facility] did very well throughout the course of her brief hospitalization. She was admitted December 29, 2019 after being found to have cellulitis associated with a cat scratch. She met criteria for sepsis related to the infection and was initiated on broad-spectrum antibiotic therapy. Infectious disease was consulted as well for their recommendations and eventual transition to oral medications. Overall, her clinical condition improved substantially. Leukocytosis resolved on current treatment and renal status remain patent and renal function remained steady. The patient's INR remains subtherapeutic while on chronic warfarin dosage, she will need outpatient follow-up with repeat INR as the Augmentin will likely cause an elevation in the patient's INR while on stable Coumadin dosage. Overall the patient's condition improved to the point where she can be transitioned home on oral antibiotic therapy and close outpatient follow-up with primary care provider. BRIEF PHYSICAL EXAMINATION AND LABORATORIES ON DAY OF DISCHARGE:  VITALS:  BP (!) 99/52   Pulse 78   Temp 98.3 °F (36.8 °C) (Oral)   Resp 16   Ht 5' 6\" (1.676 m)   Wt 130 lb (59 kg)   SpO2 94%   BMI 20.98 kg/m²     HEENT:  PERRLA. EOMI. Sclera clear. Buccal mucosa moist.    Neck:  Supple. Trachea midline. No thyromegaly. No JVD. No bruits. Heart:  Rhythm regular, rate controlled. No murmurs. Lungs:  Symmetrical. Clear to auscultation bilaterally. No wheezes. No rhonchi. No rales. Abdomen: Soft. Non-tender. Non-distended. Bowel sounds positive. No organomegaly or masses. No pain on palpation    Extremities:  Peripheral pulses present. No peripheral edema. No ulcers. Neurologic:  Alert x 3. No focal deficit. Cranial nerves grossly intact. Skin: Significant improvement of the infectious process of the left upper extremity no petechia. No hemorrhage. No wounds. DISPOSITION:  The patient's condition is good. At this time the patient is without objective evidence of an acute process requiring continuing hospitalization or inpatient management. They are stable for discharge with outpatient follow-up. I have spoken with the patient and discussed the results of the current hospitalization, in addition to providing specific details for the plan of care and counseling regarding the diagnosis and prognosis. The plan has been discussed in detail and they are aware of the specific conditions for emergent return, as well as the importance of follow-up.   Their questions are answered at this time and they are agreeable with the plan for discharge to home    DISCHARGE MEDICATIONS:    Handy Lopez   Home Medication Instructions IYD:[id number]    Printed on:12/31/19 7182   Medication Information                      acetaminophen 650 MG TABS  Take 650 mg by mouth every 6 hours as needed             amLODIPine (NORVASC) 5 MG tablet  Take 1 tablet by mouth daily             cloNIDine (CATAPRES) 0.1 MG tablet  Take 1 tablet by mouth 2 times daily             colestipol (COLESTID) 1 g tablet  Take 2 g by mouth every evening              DULoxetine HCl 40 MG CPEP  Take 40 mg by mouth daily             fluticasone-salmeterol (ADVAIR) 250-50 MCG/DOSE AEPB  Inhale 1 puff into the lungs every 12 hours             glimepiride (AMARYL) 2 MG tablet  Take 2 mg by mouth every morning (before breakfast)             ipratropium-albuterol (DUONEB) 0.5-2.5 (3) MG/3ML SOLN

## 2019-12-31 NOTE — PROGRESS NOTES
breathe. DEC BS  Cardiovascular:  S1 and S2 are rhythmic and regular. No murmurs appreciated. Abdomen:   Positive bowel sounds to auscultation. Benign to palpation.     Extremities:   No clubbing, no cyanosis,   Edema LUE REDNESS DEC DEC PAIN in rom  CNS    AAxO   Lines: pIV    Radiology:  Laboratory and Tests Review:  Lab Results   Component Value Date    WBC 8.5 12/31/2019    WBC 11.1 12/30/2019    WBC 18.7 (H) 12/29/2019    HGB 9.6 (L) 12/31/2019    HCT 30.2 (L) 12/31/2019    MCV 87.0 12/31/2019     12/31/2019     Lab Results   Component Value Date    NEUTROABS 15.90 (H) 12/29/2019    NEUTROABS 11.33 (H) 12/27/2019    NEUTROABS 6.06 03/22/2019     No results found for: CRPHS  Lab Results   Component Value Date    ALT 12 12/29/2019    AST 46 (H) 12/29/2019    ALKPHOS 80 12/29/2019    BILITOT 0.6 12/29/2019     Lab Results   Component Value Date     12/31/2019    K 4.3 12/31/2019    K 3.8 11/13/2018     12/31/2019    CO2 23 12/31/2019    BUN 14 12/31/2019    CREATININE 1.0 12/31/2019    CREATININE 1.0 12/30/2019    CREATININE 1.1 12/29/2019    GFRAA >60 12/31/2019    LABGLOM 54 12/31/2019    GLUCOSE 128 12/31/2019    GLUCOSE 158 10/03/2011    PROT 7.5 12/29/2019    LABALBU 3.1 12/29/2019    LABALBU 4.0 10/03/2011    CALCIUM 8.3 12/31/2019    BILITOT 0.6 12/29/2019    ALKPHOS 80 12/29/2019    AST 46 12/29/2019    ALT 12 12/29/2019     Lab Results   Component Value Date    CRP 14.9 (H) 12/29/2019    CRP 1.8 (H) 12/27/2019     Lab Results   Component Value Date    SEDRATE 86 (H) 12/29/2019    SEDRATE 84 (H) 12/29/2019    SEDRATE 86 (H) 12/27/2019       Microbiology:   Lab Results   Component Value Date    BC 24 Hours- no growth 12/29/2019    BC 24 Hours- no growth 12/27/2019    BC 5 Days- no growth 11/11/2018     Lab Results   Component Value Date    BLOODCULT2 24 Hours- no growth 12/29/2019    BLOODCULT2 5 Days- no growth 11/11/2018    BLOODCULT2 5 Days- no growth 02/17/2016        Urine Culture, Routine   Date Value Ref Range Status   12/30/2019 Growth not present, incubation continues  Preliminary   11/11/2018   Final    ,000 CFU/mL  Mixed darryn isolated. Further workup and sensitivity testing  is not routinely indicated and will not be performed. Mixed darryn isolated includes:  Mixed gram positive organisms  Mixed gram negative rods     02/17/2016 Growth not present  Final     MRSA Culture Only   Date Value Ref Range Status   02/19/2016 Methicillin resistant Staph aureus not isolated  Final         Lab Results   Component Value Date    BLOODCULT2 24 Hours- no growth 12/29/2019       ASSESSMENT: ON ADMISSION PT HAD   Chief Complaint   Patient presents with    Other     left wrist swelling and is painful, seen here on friday, was bit by a cat, now more swollen, red and more painful, gowing up her arm      · LEUKOCYTOSIS RESOLVED  · LEFT UE CELLULITIS FAILED ORALS  · CAT SCRATCH     PLAN:  change to oral augmentin   SPOKE WITH DR Skylar Goldman AND FAMILY POC    Can d/c with augmentin   Pt med rec  Side effects discussed  F/u 1-2 weeks    · Continue  · Check final cultures  · Monitor labs    Imaging and labs were reviewed per medical records and any ID pertinent labs were addressed with the patient. The patient was educated about the diagnosis, prognosis, indications, risks and benefits of treatment. An opportunity to ask questions was given to the patient and questions were answered.         Electronically signed by Juanita Ramey MD on 12/31/2019 at 2:47 PM

## 2020-01-01 ENCOUNTER — APPOINTMENT (OUTPATIENT)
Dept: GENERAL RADIOLOGY | Age: 80
End: 2020-01-01
Payer: MEDICARE

## 2020-01-01 ENCOUNTER — HOSPITAL ENCOUNTER (OUTPATIENT)
Age: 80
Discharge: HOME | End: 2020-05-20
Payer: MEDICARE

## 2020-01-01 ENCOUNTER — APPOINTMENT (OUTPATIENT)
Dept: CT IMAGING | Age: 80
End: 2020-01-01
Payer: MEDICARE

## 2020-01-01 ENCOUNTER — CARE COORDINATION (OUTPATIENT)
Dept: CASE MANAGEMENT | Age: 80
End: 2020-01-01

## 2020-01-01 ENCOUNTER — APPOINTMENT (OUTPATIENT)
Dept: GENERAL RADIOLOGY | Age: 80
DRG: 871 | End: 2020-01-01
Payer: MEDICARE

## 2020-01-01 ENCOUNTER — HOSPITAL ENCOUNTER (EMERGENCY)
Age: 80
Discharge: ANOTHER HOSPITAL | End: 2020-10-25
Attending: EMERGENCY MEDICINE
Payer: MEDICARE

## 2020-01-01 ENCOUNTER — HOSPITAL ENCOUNTER (INPATIENT)
Age: 80
LOS: 14 days | Discharge: HOME HEALTH | DRG: 871 | End: 2020-12-01
Attending: INTERNAL MEDICINE | Admitting: INTERNAL MEDICINE
Payer: MEDICARE

## 2020-01-01 ENCOUNTER — HOSPITAL ENCOUNTER (OUTPATIENT)
Dept: GENERAL RADIOLOGY | Age: 80
Discharge: HOME | End: 2020-05-15
Payer: MEDICARE

## 2020-01-01 ENCOUNTER — TELEPHONE (OUTPATIENT)
Dept: OTHER | Facility: CLINIC | Age: 80
End: 2020-01-01

## 2020-01-01 ENCOUNTER — HOSPITAL ENCOUNTER (OUTPATIENT)
Dept: GENERAL RADIOLOGY | Age: 80
Discharge: HOME | End: 2020-05-20
Payer: MEDICARE

## 2020-01-01 ENCOUNTER — HOSPITAL ENCOUNTER (OUTPATIENT)
Age: 80
Discharge: HOME | End: 2020-05-15
Payer: MEDICARE

## 2020-01-01 ENCOUNTER — ANESTHESIA (OUTPATIENT)
Dept: ENDOSCOPY | Age: 80
DRG: 871 | End: 2020-01-01
Payer: MEDICARE

## 2020-01-01 ENCOUNTER — ANESTHESIA EVENT (OUTPATIENT)
Dept: ENDOSCOPY | Age: 80
DRG: 871 | End: 2020-01-01
Payer: MEDICARE

## 2020-01-01 ENCOUNTER — HOSPITAL ENCOUNTER (EMERGENCY)
Age: 80
Discharge: HOME | End: 2020-10-06
Attending: EMERGENCY MEDICINE
Payer: MEDICARE

## 2020-01-01 ENCOUNTER — CARE COORDINATION (OUTPATIENT)
Dept: CARE COORDINATION | Age: 80
End: 2020-01-01

## 2020-01-01 ENCOUNTER — APPOINTMENT (OUTPATIENT)
Dept: GENERAL RADIOLOGY | Age: 80
DRG: 177 | End: 2020-01-01
Payer: MEDICARE

## 2020-01-01 ENCOUNTER — HOSPITAL ENCOUNTER (EMERGENCY)
Age: 80
Discharge: HOME | End: 2020-05-25
Attending: EMERGENCY MEDICINE
Payer: MEDICARE

## 2020-01-01 ENCOUNTER — APPOINTMENT (OUTPATIENT)
Dept: CT IMAGING | Age: 80
DRG: 177 | End: 2020-01-01
Payer: MEDICARE

## 2020-01-01 ENCOUNTER — HOSPITAL ENCOUNTER (INPATIENT)
Age: 80
LOS: 5 days | Discharge: HOSPICE | DRG: 177 | End: 2020-12-16
Attending: EMERGENCY MEDICINE | Admitting: INTERNAL MEDICINE
Payer: MEDICARE

## 2020-01-01 VITALS
DIASTOLIC BLOOD PRESSURE: 85 MMHG | OXYGEN SATURATION: 93 % | TEMPERATURE: 98.1 F | HEART RATE: 101 BPM | BODY MASS INDEX: 19.14 KG/M2 | WEIGHT: 115 LBS | SYSTOLIC BLOOD PRESSURE: 155 MMHG | RESPIRATION RATE: 14 BRPM

## 2020-01-01 VITALS
OXYGEN SATURATION: 98 % | SYSTOLIC BLOOD PRESSURE: 117 MMHG | DIASTOLIC BLOOD PRESSURE: 67 MMHG | RESPIRATION RATE: 18 BRPM

## 2020-01-01 VITALS
WEIGHT: 149 LBS | RESPIRATION RATE: 18 BRPM | HEIGHT: 66 IN | DIASTOLIC BLOOD PRESSURE: 78 MMHG | OXYGEN SATURATION: 96 % | SYSTOLIC BLOOD PRESSURE: 162 MMHG | BODY MASS INDEX: 23.95 KG/M2 | TEMPERATURE: 97.7 F | HEART RATE: 97 BPM

## 2020-01-01 VITALS
HEIGHT: 65 IN | TEMPERATURE: 97.7 F | OXYGEN SATURATION: 94 % | SYSTOLIC BLOOD PRESSURE: 176 MMHG | HEART RATE: 88 BPM | WEIGHT: 115 LBS | RESPIRATION RATE: 20 BRPM | DIASTOLIC BLOOD PRESSURE: 84 MMHG | BODY MASS INDEX: 19.16 KG/M2

## 2020-01-01 VITALS
DIASTOLIC BLOOD PRESSURE: 75 MMHG | TEMPERATURE: 97.9 F | HEART RATE: 92 BPM | OXYGEN SATURATION: 95 % | SYSTOLIC BLOOD PRESSURE: 165 MMHG | RESPIRATION RATE: 17 BRPM | WEIGHT: 131 LBS | BODY MASS INDEX: 21.83 KG/M2 | HEIGHT: 65 IN

## 2020-01-01 VITALS
HEART RATE: 127 BPM | WEIGHT: 123 LBS | HEIGHT: 62 IN | SYSTOLIC BLOOD PRESSURE: 166 MMHG | DIASTOLIC BLOOD PRESSURE: 85 MMHG | OXYGEN SATURATION: 74 % | TEMPERATURE: 97.5 F | BODY MASS INDEX: 22.63 KG/M2 | RESPIRATION RATE: 16 BRPM

## 2020-01-01 LAB
ABO/RH: NORMAL
ACINETOBACTER BAUMANNII BY PCR: NOT DETECTED
ADENOVIRUS BY PCR: NOT DETECTED
ALBUMIN SERPL-MCNC: 1.9 G/DL (ref 3.5–5.2)
ALBUMIN SERPL-MCNC: 2 G/DL (ref 3.5–5.2)
ALBUMIN SERPL-MCNC: 2.1 G/DL (ref 3.5–5.2)
ALBUMIN SERPL-MCNC: 2.3 G/DL (ref 3.5–5.2)
ALBUMIN SERPL-MCNC: 2.4 G/DL (ref 3.5–5.2)
ALBUMIN SERPL-MCNC: 2.5 G/DL (ref 3.5–5.2)
ALBUMIN SERPL-MCNC: 2.6 G/DL (ref 3.5–5.2)
ALBUMIN SERPL-MCNC: 2.7 G/DL (ref 3.5–5.2)
ALBUMIN SERPL-MCNC: 2.8 G/DL (ref 3.5–5.2)
ALBUMIN SERPL-MCNC: 3 G/DL (ref 3.5–5.2)
ALBUMIN SERPL-MCNC: 3.1 G/DL (ref 3.5–5.2)
ALP BLD-CCNC: 100 U/L (ref 35–104)
ALP BLD-CCNC: 101 U/L (ref 35–104)
ALP BLD-CCNC: 111 U/L (ref 35–104)
ALP BLD-CCNC: 111 U/L (ref 35–104)
ALP BLD-CCNC: 112 U/L (ref 35–104)
ALP BLD-CCNC: 114 U/L (ref 35–104)
ALP BLD-CCNC: 115 U/L (ref 35–104)
ALP BLD-CCNC: 118 U/L (ref 35–104)
ALP BLD-CCNC: 120 U/L (ref 35–104)
ALP BLD-CCNC: 120 U/L (ref 35–104)
ALP BLD-CCNC: 122 U/L (ref 35–104)
ALP BLD-CCNC: 127 U/L (ref 35–104)
ALP BLD-CCNC: 128 U/L (ref 35–104)
ALP BLD-CCNC: 129 U/L (ref 35–104)
ALP BLD-CCNC: 132 U/L (ref 35–104)
ALP BLD-CCNC: 135 U/L (ref 35–104)
ALP BLD-CCNC: 138 U/L (ref 35–104)
ALP BLD-CCNC: 140 U/L (ref 35–104)
ALP BLD-CCNC: 170 U/L (ref 35–104)
ALP BLD-CCNC: 220 U/L (ref 35–104)
ALP BLD-CCNC: 91 U/L (ref 35–104)
ALP BLD-CCNC: 98 U/L (ref 35–104)
ALT SERPL-CCNC: 10 U/L (ref 0–32)
ALT SERPL-CCNC: 10 U/L (ref 0–32)
ALT SERPL-CCNC: 11 U/L (ref 0–32)
ALT SERPL-CCNC: 16 U/L (ref 0–32)
ALT SERPL-CCNC: 17 U/L (ref 0–32)
ALT SERPL-CCNC: 18 U/L (ref 0–32)
ALT SERPL-CCNC: 18 U/L (ref 0–32)
ALT SERPL-CCNC: 19 U/L (ref 0–32)
ALT SERPL-CCNC: 20 U/L (ref 0–32)
ALT SERPL-CCNC: 21 U/L (ref 0–32)
ALT SERPL-CCNC: 21 U/L (ref 0–32)
ALT SERPL-CCNC: 22 U/L (ref 0–32)
ALT SERPL-CCNC: 24 U/L (ref 0–32)
ALT SERPL-CCNC: 27 U/L (ref 0–32)
ALT SERPL-CCNC: 8 U/L (ref 0–32)
ALT SERPL-CCNC: 9 U/L (ref 0–32)
AMMONIA: 21 UMOL/L (ref 11–51)
AMMONIA: <10 UMOL/L (ref 11–51)
ANION GAP SERPL CALCULATED.3IONS-SCNC: 10 MMOL/L (ref 7–16)
ANION GAP SERPL CALCULATED.3IONS-SCNC: 10 MMOL/L (ref 7–16)
ANION GAP SERPL CALCULATED.3IONS-SCNC: 11 MMOL/L (ref 7–16)
ANION GAP SERPL CALCULATED.3IONS-SCNC: 12 MMOL/L (ref 7–16)
ANION GAP SERPL CALCULATED.3IONS-SCNC: 13 MMOL/L (ref 7–16)
ANION GAP SERPL CALCULATED.3IONS-SCNC: 14 MMOL/L (ref 7–16)
ANION GAP SERPL CALCULATED.3IONS-SCNC: 16 MMOL/L (ref 7–16)
ANION GAP SERPL CALCULATED.3IONS-SCNC: 17 MMOL/L (ref 7–16)
ANION GAP SERPL CALCULATED.3IONS-SCNC: 8 MMOL/L (ref 7–16)
ANION GAP SERPL CALCULATED.3IONS-SCNC: 8 MMOL/L (ref 7–16)
ANION GAP SERPL CALCULATED.3IONS-SCNC: 9 MMOL/L (ref 7–16)
ANION GAP SERPL CALCULATED.3IONS-SCNC: 9 MMOL/L (ref 7–16)
ANTIBODY SCREEN: NORMAL
APTT: 31.4 SEC (ref 24.5–35.1)
APTT: 31.8 SEC (ref 24.5–35.1)
APTT: 32 SEC (ref 24.5–35.1)
APTT: 32.6 SEC (ref 24.5–35.1)
APTT: 35 SEC (ref 24.5–35.1)
APTT: 41.7 SEC (ref 24.5–35.1)
APTT: 45.7 SEC (ref 24.5–35.1)
AST SERPL-CCNC: 15 U/L (ref 0–31)
AST SERPL-CCNC: 15 U/L (ref 0–31)
AST SERPL-CCNC: 18 U/L (ref 0–31)
AST SERPL-CCNC: 18 U/L (ref 0–31)
AST SERPL-CCNC: 19 U/L (ref 0–31)
AST SERPL-CCNC: 23 U/L (ref 0–31)
AST SERPL-CCNC: 25 U/L (ref 0–31)
AST SERPL-CCNC: 26 U/L (ref 0–31)
AST SERPL-CCNC: 27 U/L (ref 0–31)
AST SERPL-CCNC: 28 U/L (ref 0–31)
AST SERPL-CCNC: 29 U/L (ref 0–31)
AST SERPL-CCNC: 29 U/L (ref 0–31)
AST SERPL-CCNC: 31 U/L (ref 0–31)
AST SERPL-CCNC: 32 U/L (ref 0–31)
AST SERPL-CCNC: 35 U/L (ref 0–31)
AST SERPL-CCNC: 37 U/L (ref 0–31)
AST SERPL-CCNC: 39 U/L (ref 0–31)
AST SERPL-CCNC: 41 U/L (ref 0–31)
B.E.: -0.4 MMOL/L (ref -3–3)
B.E.: -1.2 MMOL/L (ref -3–3)
B.E.: -1.8 MMOL/L (ref -3–3)
B.E.: -5.1 MMOL/L (ref -3–3)
B.E.: 1.3 MMOL/L (ref -3–3)
BACTERIA: ABNORMAL /HPF
BASOPHILS ABSOLUTE: 0 E9/L (ref 0–0.2)
BASOPHILS ABSOLUTE: 0.01 E9/L (ref 0–0.2)
BASOPHILS ABSOLUTE: 0.02 E9/L (ref 0–0.2)
BASOPHILS ABSOLUTE: 0.04 E9/L (ref 0–0.2)
BASOPHILS ABSOLUTE: 0.05 E9/L (ref 0–0.2)
BASOPHILS ABSOLUTE: 0.05 E9/L (ref 0–0.2)
BASOPHILS ABSOLUTE: 0.06 E9/L (ref 0–0.2)
BASOPHILS ABSOLUTE: 0.07 E9/L (ref 0–0.2)
BASOPHILS ABSOLUTE: 0.08 E9/L (ref 0–0.2)
BASOPHILS ABSOLUTE: 0.1 E9/L (ref 0–0.2)
BASOPHILS RELATIVE PERCENT: 0 % (ref 0–2)
BASOPHILS RELATIVE PERCENT: 0.1 % (ref 0–2)
BASOPHILS RELATIVE PERCENT: 0.2 % (ref 0–2)
BASOPHILS RELATIVE PERCENT: 0.2 % (ref 0–2)
BASOPHILS RELATIVE PERCENT: 0.3 % (ref 0–2)
BASOPHILS RELATIVE PERCENT: 0.3 % (ref 0–2)
BASOPHILS RELATIVE PERCENT: 0.4 % (ref 0–2)
BASOPHILS RELATIVE PERCENT: 0.5 % (ref 0–2)
BASOPHILS RELATIVE PERCENT: 0.6 % (ref 0–2)
BASOPHILS RELATIVE PERCENT: 0.7 % (ref 0–2)
BASOPHILS RELATIVE PERCENT: 0.9 % (ref 0–2)
BASOPHILS RELATIVE PERCENT: 0.9 % (ref 0–2)
BILIRUB SERPL-MCNC: 0.2 MG/DL (ref 0–1.2)
BILIRUB SERPL-MCNC: 0.3 MG/DL (ref 0–1.2)
BILIRUB SERPL-MCNC: 0.4 MG/DL (ref 0–1.2)
BILIRUB SERPL-MCNC: 0.4 MG/DL (ref 0–1.2)
BILIRUB SERPL-MCNC: <0.2 MG/DL (ref 0–1.2)
BILIRUBIN URINE: NEGATIVE
BLOOD BANK DISPENSE STATUS: NORMAL
BLOOD BANK DISPENSE STATUS: NORMAL
BLOOD BANK PRODUCT CODE: NORMAL
BLOOD BANK PRODUCT CODE: NORMAL
BLOOD CULTURE, ROUTINE: ABNORMAL
BLOOD CULTURE, ROUTINE: NORMAL
BLOOD, URINE: ABNORMAL
BORDETELLA PARAPERTUSSIS BY PCR: NOT DETECTED
BORDETELLA PERTUSSIS BY PCR: NOT DETECTED
BOTTLE TYPE: ABNORMAL
BPU ID: NORMAL
BPU ID: NORMAL
BUN BLDV-MCNC: 12 MG/DL (ref 8–23)
BUN BLDV-MCNC: 12 MG/DL (ref 8–23)
BUN BLDV-MCNC: 13 MG/DL (ref 8–23)
BUN BLDV-MCNC: 14 MG/DL (ref 8–23)
BUN BLDV-MCNC: 16 MG/DL (ref 8–23)
BUN BLDV-MCNC: 16 MG/DL (ref 8–23)
BUN BLDV-MCNC: 17 MG/DL (ref 8–23)
BUN BLDV-MCNC: 17 MG/DL (ref 8–23)
BUN BLDV-MCNC: 18 MG/DL (ref 8–23)
BUN BLDV-MCNC: 21 MG/DL (ref 8–23)
BUN BLDV-MCNC: 22 MG/DL (ref 8–23)
BUN BLDV-MCNC: 22 MG/DL (ref 8–23)
BUN BLDV-MCNC: 23 MG/DL (ref 8–23)
BUN BLDV-MCNC: 25 MG/DL (ref 8–23)
BUN BLDV-MCNC: 25 MG/DL (ref 8–23)
BUN BLDV-MCNC: 26 MG/DL (ref 8–23)
BUN BLDV-MCNC: 28 MG/DL (ref 8–23)
BUN BLDV-MCNC: 30 MG/DL (ref 8–23)
BUN BLDV-MCNC: 30 MG/DL (ref 8–23)
BUN BLDV-MCNC: 33 MG/DL (ref 8–23)
BUN BLDV-MCNC: 33 MG/DL (ref 8–23)
BUN BLDV-MCNC: 35 MG/DL (ref 8–23)
C DIFF TOXIN/ANTIGEN: ABNORMAL
C-REACTIVE PROTEIN: 17.6 MG/DL (ref 0–0.4)
C-REACTIVE PROTEIN: 2.8 MG/DL (ref 0–0.4)
C-REACTIVE PROTEIN: 4.1 MG/DL (ref 0–0.4)
CALCIUM SERPL-MCNC: 7.5 MG/DL (ref 8.6–10.2)
CALCIUM SERPL-MCNC: 7.8 MG/DL (ref 8.6–10.2)
CALCIUM SERPL-MCNC: 7.9 MG/DL (ref 8.6–10.2)
CALCIUM SERPL-MCNC: 8.1 MG/DL (ref 8.6–10.2)
CALCIUM SERPL-MCNC: 8.2 MG/DL (ref 8.6–10.2)
CALCIUM SERPL-MCNC: 8.3 MG/DL (ref 8.6–10.2)
CALCIUM SERPL-MCNC: 8.6 MG/DL (ref 8.6–10.2)
CALCIUM SERPL-MCNC: 8.7 MG/DL (ref 8.6–10.2)
CALCIUM SERPL-MCNC: 8.8 MG/DL (ref 8.6–10.2)
CALCIUM SERPL-MCNC: 8.9 MG/DL (ref 8.6–10.2)
CALCIUM SERPL-MCNC: 9.2 MG/DL (ref 8.6–10.2)
CALCIUM SERPL-MCNC: 9.3 MG/DL (ref 8.6–10.2)
CALCIUM SERPL-MCNC: 9.4 MG/DL (ref 8.6–10.2)
CANDIDA ALBICANS BY PCR: NOT DETECTED
CANDIDA GLABRATA BY PCR: NOT DETECTED
CANDIDA KRUSEI BY PCR: NOT DETECTED
CANDIDA PARAPSILOSIS BY PCR: NOT DETECTED
CANDIDA TROPICALIS BY PCR: NOT DETECTED
CHLAMYDOPHILIA PNEUMONIAE BY PCR: NOT DETECTED
CHLORIDE BLD-SCNC: 100 MMOL/L (ref 98–107)
CHLORIDE BLD-SCNC: 101 MMOL/L (ref 98–107)
CHLORIDE BLD-SCNC: 102 MMOL/L (ref 98–107)
CHLORIDE BLD-SCNC: 103 MMOL/L (ref 98–107)
CHLORIDE BLD-SCNC: 103 MMOL/L (ref 98–107)
CHLORIDE BLD-SCNC: 104 MMOL/L (ref 98–107)
CHLORIDE BLD-SCNC: 89 MMOL/L (ref 98–107)
CHLORIDE BLD-SCNC: 90 MMOL/L (ref 98–107)
CHLORIDE BLD-SCNC: 91 MMOL/L (ref 98–107)
CHLORIDE BLD-SCNC: 92 MMOL/L (ref 98–107)
CHLORIDE BLD-SCNC: 93 MMOL/L (ref 98–107)
CHLORIDE BLD-SCNC: 93 MMOL/L (ref 98–107)
CHLORIDE BLD-SCNC: 94 MMOL/L (ref 98–107)
CHLORIDE BLD-SCNC: 95 MMOL/L (ref 98–107)
CHLORIDE BLD-SCNC: 97 MMOL/L (ref 98–107)
CHLORIDE BLD-SCNC: 98 MMOL/L (ref 98–107)
CHOLESTEROL, TOTAL: 180 MG/DL (ref 0–199)
CLARITY: ABNORMAL
CLARITY: CLEAR
CLARITY: CLEAR
CO2: 18 MMOL/L (ref 22–29)
CO2: 19 MMOL/L (ref 22–29)
CO2: 20 MMOL/L (ref 22–29)
CO2: 21 MMOL/L (ref 22–29)
CO2: 22 MMOL/L (ref 22–29)
CO2: 23 MMOL/L (ref 22–29)
CO2: 24 MMOL/L (ref 22–29)
CO2: 24 MMOL/L (ref 22–29)
CO2: 25 MMOL/L (ref 22–29)
CO2: 26 MMOL/L (ref 22–29)
CO2: 28 MMOL/L (ref 22–29)
CO2: 28 MMOL/L (ref 22–29)
CO2: 30 MMOL/L (ref 22–29)
COHB: 0.2 % (ref 0–1.5)
COHB: 1.3 % (ref 0–1.5)
COLOR: YELLOW
COMMENT: ABNORMAL
CORONAVIRUS 229E BY PCR: NOT DETECTED
CORONAVIRUS HKU1 BY PCR: NOT DETECTED
CORONAVIRUS NL63 BY PCR: NOT DETECTED
CORONAVIRUS OC43 BY PCR: NOT DETECTED
CREAT SERPL-MCNC: 0.7 MG/DL (ref 0.5–1)
CREAT SERPL-MCNC: 0.8 MG/DL (ref 0.5–1)
CREAT SERPL-MCNC: 0.9 MG/DL (ref 0.5–1)
CREAT SERPL-MCNC: 1 MG/DL (ref 0.5–1)
CREAT SERPL-MCNC: 1 MG/DL (ref 0.5–1)
CREAT SERPL-MCNC: 1.1 MG/DL (ref 0.5–1)
CREAT SERPL-MCNC: 1.2 MG/DL (ref 0.5–1)
CRITICAL: ABNORMAL
CRITICAL: ABNORMAL
CULTURE, BLOOD 2: ABNORMAL
CULTURE, BLOOD 2: ABNORMAL
CULTURE, BLOOD 2: NORMAL
D DIMER: 215 NG/ML DDU
D DIMER: 271 NG/ML DDU
D DIMER: 306 NG/ML DDU
D DIMER: 385 NG/ML DDU
D DIMER: 526 NG/ML DDU
D DIMER: 717 NG/ML DDU
D DIMER: 864 NG/ML DDU
DATE ANALYZED: ABNORMAL
DATE ANALYZED: ABNORMAL
DATE OF COLLECTION: ABNORMAL
DATE OF COLLECTION: ABNORMAL
DELIVERY SYSTEMS: ABNORMAL
DESCRIPTION BLOOD BANK: NORMAL
DESCRIPTION BLOOD BANK: NORMAL
DEVICE: ABNORMAL
EKG ATRIAL RATE: 106 BPM
EKG ATRIAL RATE: 111 BPM
EKG ATRIAL RATE: 77 BPM
EKG ATRIAL RATE: 80 BPM
EKG P AXIS: 12 DEGREES
EKG P AXIS: 41 DEGREES
EKG P AXIS: 49 DEGREES
EKG P AXIS: 76 DEGREES
EKG P-R INTERVAL: 114 MS
EKG P-R INTERVAL: 122 MS
EKG P-R INTERVAL: 128 MS
EKG P-R INTERVAL: 130 MS
EKG Q-T INTERVAL: 334 MS
EKG Q-T INTERVAL: 350 MS
EKG Q-T INTERVAL: 394 MS
EKG Q-T INTERVAL: 400 MS
EKG QRS DURATION: 76 MS
EKG QRS DURATION: 76 MS
EKG QRS DURATION: 82 MS
EKG QRS DURATION: 86 MS
EKG QTC CALCULATION (BAZETT): 452 MS
EKG QTC CALCULATION (BAZETT): 454 MS
EKG QTC CALCULATION (BAZETT): 454 MS
EKG QTC CALCULATION (BAZETT): 464 MS
EKG R AXIS: 49 DEGREES
EKG R AXIS: 65 DEGREES
EKG R AXIS: 68 DEGREES
EKG R AXIS: 76 DEGREES
EKG T AXIS: 54 DEGREES
EKG T AXIS: 59 DEGREES
EKG T AXIS: 72 DEGREES
EKG T AXIS: 90 DEGREES
EKG VENTRICULAR RATE: 106 BPM
EKG VENTRICULAR RATE: 111 BPM
EKG VENTRICULAR RATE: 77 BPM
EKG VENTRICULAR RATE: 80 BPM
ENTEROBACTER CLOACAE COMPLEX BY PCR: NOT DETECTED
ENTEROBACTERALES BY PCR: NOT DETECTED
ENTEROCOCCUS BY PCR: DETECTED
EOSINOPHILS ABSOLUTE: 0 E9/L (ref 0.05–0.5)
EOSINOPHILS ABSOLUTE: 0.02 E9/L (ref 0.05–0.5)
EOSINOPHILS ABSOLUTE: 0.04 E9/L (ref 0.05–0.5)
EOSINOPHILS ABSOLUTE: 0.04 E9/L (ref 0.05–0.5)
EOSINOPHILS ABSOLUTE: 0.05 E9/L (ref 0.05–0.5)
EOSINOPHILS ABSOLUTE: 0.06 E9/L (ref 0.05–0.5)
EOSINOPHILS ABSOLUTE: 0.07 E9/L (ref 0.05–0.5)
EOSINOPHILS ABSOLUTE: 0.08 E9/L (ref 0.05–0.5)
EOSINOPHILS ABSOLUTE: 0.11 E9/L (ref 0.05–0.5)
EOSINOPHILS ABSOLUTE: 0.12 E9/L (ref 0.05–0.5)
EOSINOPHILS ABSOLUTE: 0.14 E9/L (ref 0.05–0.5)
EOSINOPHILS ABSOLUTE: 0.16 E9/L (ref 0.05–0.5)
EOSINOPHILS ABSOLUTE: 0.17 E9/L (ref 0.05–0.5)
EOSINOPHILS ABSOLUTE: 0.23 E9/L (ref 0.05–0.5)
EOSINOPHILS ABSOLUTE: 0.25 E9/L (ref 0.05–0.5)
EOSINOPHILS RELATIVE PERCENT: 0 % (ref 0–6)
EOSINOPHILS RELATIVE PERCENT: 0.1 % (ref 0–6)
EOSINOPHILS RELATIVE PERCENT: 0.2 % (ref 0–6)
EOSINOPHILS RELATIVE PERCENT: 0.4 % (ref 0–6)
EOSINOPHILS RELATIVE PERCENT: 0.4 % (ref 0–6)
EOSINOPHILS RELATIVE PERCENT: 0.5 % (ref 0–6)
EOSINOPHILS RELATIVE PERCENT: 0.6 % (ref 0–6)
EOSINOPHILS RELATIVE PERCENT: 0.7 % (ref 0–6)
EOSINOPHILS RELATIVE PERCENT: 0.9 % (ref 0–6)
EOSINOPHILS RELATIVE PERCENT: 1 % (ref 0–6)
EOSINOPHILS RELATIVE PERCENT: 1.2 % (ref 0–6)
EOSINOPHILS RELATIVE PERCENT: 1.2 % (ref 0–6)
EOSINOPHILS RELATIVE PERCENT: 1.6 % (ref 0–6)
EOSINOPHILS RELATIVE PERCENT: 2.4 % (ref 0–6)
EOSINOPHILS RELATIVE PERCENT: 2.8 % (ref 0–6)
EPITHELIAL CELLS, UA: ABNORMAL /HPF
EPITHELIAL CELLS, UA: ABNORMAL /HPF
ESCHERICHIA COLI BY PCR: NOT DETECTED
FERRITIN: 1576 NG/ML
FERRITIN: 390 NG/ML
FERRITIN: 403 NG/ML
FIBRINOGEN: 632 MG/DL (ref 225–540)
FIBRINOGEN: 673 MG/DL (ref 225–540)
FIBRINOGEN: 694 MG/DL (ref 225–540)
FIBRINOGEN: >700 MG/DL (ref 225–540)
FIO2 ARTERIAL: 15
FIO2 ARTERIAL: 15
GFR AFRICAN AMERICAN: 52
GFR AFRICAN AMERICAN: 58
GFR AFRICAN AMERICAN: >60
GFR NON-AFRICAN AMERICAN: 43 ML/MIN/1.73
GFR NON-AFRICAN AMERICAN: 48 ML/MIN/1.73
GFR NON-AFRICAN AMERICAN: 53 ML/MIN/1.73
GFR NON-AFRICAN AMERICAN: 53 ML/MIN/1.73
GFR NON-AFRICAN AMERICAN: >60 ML/MIN/1.73
GLUCOSE BLD-MCNC: 125 MG/DL (ref 74–99)
GLUCOSE BLD-MCNC: 126 MG/DL (ref 74–99)
GLUCOSE BLD-MCNC: 127 MG/DL (ref 74–99)
GLUCOSE BLD-MCNC: 127 MG/DL (ref 74–99)
GLUCOSE BLD-MCNC: 131 MG/DL (ref 74–99)
GLUCOSE BLD-MCNC: 137 MG/DL (ref 74–99)
GLUCOSE BLD-MCNC: 142 MG/DL (ref 74–99)
GLUCOSE BLD-MCNC: 164 MG/DL (ref 74–99)
GLUCOSE BLD-MCNC: 165 MG/DL (ref 74–99)
GLUCOSE BLD-MCNC: 174 MG/DL (ref 74–99)
GLUCOSE BLD-MCNC: 174 MG/DL (ref 74–99)
GLUCOSE BLD-MCNC: 193 MG/DL (ref 74–99)
GLUCOSE BLD-MCNC: 199 MG/DL (ref 74–99)
GLUCOSE BLD-MCNC: 220 MG/DL (ref 74–99)
GLUCOSE BLD-MCNC: 220 MG/DL (ref 74–99)
GLUCOSE BLD-MCNC: 221 MG/DL (ref 74–99)
GLUCOSE BLD-MCNC: 228 MG/DL (ref 74–99)
GLUCOSE BLD-MCNC: 228 MG/DL (ref 74–99)
GLUCOSE BLD-MCNC: 246 MG/DL (ref 74–99)
GLUCOSE BLD-MCNC: 53 MG/DL (ref 74–99)
GLUCOSE BLD-MCNC: 71 MG/DL (ref 74–99)
GLUCOSE BLD-MCNC: 92 MG/DL (ref 74–99)
GLUCOSE BLD-MCNC: 95 MG/DL (ref 74–99)
GLUCOSE BLD-MCNC: 96 MG/DL (ref 74–99)
GLUCOSE URINE: NEGATIVE MG/DL
HAEMOPHILUS INFLUENZAE BY PCR: NOT DETECTED
HCO3 ARTERIAL: 20 MMOL/L (ref 22–26)
HCO3 ARTERIAL: 22.1 MMOL/L (ref 22–26)
HCO3 ARTERIAL: 22.7 MMOL/L (ref 22–26)
HCO3: 22.6 MMOL/L (ref 22–26)
HCO3: 24 MMOL/L (ref 22–26)
HCT VFR BLD CALC: 31 % (ref 34–48)
HCT VFR BLD CALC: 31.4 % (ref 34–48)
HCT VFR BLD CALC: 33.1 % (ref 34–48)
HCT VFR BLD CALC: 33.2 % (ref 34–48)
HCT VFR BLD CALC: 34.4 % (ref 34–48)
HCT VFR BLD CALC: 34.8 % (ref 34–48)
HCT VFR BLD CALC: 35.1 % (ref 34–48)
HCT VFR BLD CALC: 35.4 % (ref 34–48)
HCT VFR BLD CALC: 35.7 % (ref 34–48)
HCT VFR BLD CALC: 35.7 % (ref 34–48)
HCT VFR BLD CALC: 36 % (ref 34–48)
HCT VFR BLD CALC: 36.2 % (ref 34–48)
HCT VFR BLD CALC: 36.7 % (ref 34–48)
HCT VFR BLD CALC: 36.9 % (ref 34–48)
HCT VFR BLD CALC: 37.2 % (ref 34–48)
HCT VFR BLD CALC: 37.2 % (ref 34–48)
HCT VFR BLD CALC: 37.5 % (ref 34–48)
HCT VFR BLD CALC: 38.8 % (ref 34–48)
HCT VFR BLD CALC: 38.9 % (ref 34–48)
HCT VFR BLD CALC: 41.5 % (ref 34–48)
HCT VFR BLD CALC: 42 % (ref 34–48)
HCT VFR BLD CALC: 42.2 % (ref 34–48)
HCT VFR BLD CALC: 45 % (ref 34–48)
HDLC SERPL-MCNC: 67 MG/DL
HEMOGLOBIN: 10.2 G/DL (ref 11.5–15.5)
HEMOGLOBIN: 10.5 G/DL (ref 11.5–15.5)
HEMOGLOBIN: 10.9 G/DL (ref 11.5–15.5)
HEMOGLOBIN: 10.9 G/DL (ref 11.5–15.5)
HEMOGLOBIN: 11 G/DL (ref 11.5–15.5)
HEMOGLOBIN: 11.5 G/DL (ref 11.5–15.5)
HEMOGLOBIN: 11.6 G/DL (ref 11.5–15.5)
HEMOGLOBIN: 11.6 G/DL (ref 11.5–15.5)
HEMOGLOBIN: 11.7 G/DL (ref 11.5–15.5)
HEMOGLOBIN: 11.8 G/DL (ref 11.5–15.5)
HEMOGLOBIN: 11.9 G/DL (ref 11.5–15.5)
HEMOGLOBIN: 12.2 G/DL (ref 11.5–15.5)
HEMOGLOBIN: 12.4 G/DL (ref 11.5–15.5)
HEMOGLOBIN: 12.5 G/DL (ref 11.5–15.5)
HEMOGLOBIN: 13.6 G/DL (ref 11.5–15.5)
HEMOGLOBIN: 13.7 G/DL (ref 11.5–15.5)
HEMOGLOBIN: 13.8 G/DL (ref 11.5–15.5)
HEMOGLOBIN: 14.6 G/DL (ref 11.5–15.5)
HEMOGLOBIN: 9.9 G/DL (ref 11.5–15.5)
HHB: 17.4 % (ref 0–5)
HHB: 7.2 % (ref 0–5)
HUMAN METAPNEUMOVIRUS BY PCR: NOT DETECTED
HUMAN RHINOVIRUS/ENTEROVIRUS BY PCR: NOT DETECTED
IMMATURE GRANULOCYTES #: 0.01 E9/L
IMMATURE GRANULOCYTES #: 0.02 E9/L
IMMATURE GRANULOCYTES #: 0.03 E9/L
IMMATURE GRANULOCYTES #: 0.06 E9/L
IMMATURE GRANULOCYTES #: 0.06 E9/L
IMMATURE GRANULOCYTES #: 0.07 E9/L
IMMATURE GRANULOCYTES #: 0.07 E9/L
IMMATURE GRANULOCYTES #: 0.08 E9/L
IMMATURE GRANULOCYTES #: 0.09 E9/L
IMMATURE GRANULOCYTES #: 0.11 E9/L
IMMATURE GRANULOCYTES #: 0.12 E9/L
IMMATURE GRANULOCYTES #: 0.14 E9/L
IMMATURE GRANULOCYTES #: 0.16 E9/L
IMMATURE GRANULOCYTES #: 0.17 E9/L
IMMATURE GRANULOCYTES #: 0.17 E9/L
IMMATURE GRANULOCYTES #: 0.2 E9/L
IMMATURE GRANULOCYTES #: 0.23 E9/L
IMMATURE GRANULOCYTES %: 0.2 % (ref 0–5)
IMMATURE GRANULOCYTES %: 0.3 % (ref 0–5)
IMMATURE GRANULOCYTES %: 0.3 % (ref 0–5)
IMMATURE GRANULOCYTES %: 0.4 % (ref 0–5)
IMMATURE GRANULOCYTES %: 0.4 % (ref 0–5)
IMMATURE GRANULOCYTES %: 0.6 % (ref 0–5)
IMMATURE GRANULOCYTES %: 0.8 % (ref 0–5)
IMMATURE GRANULOCYTES %: 0.9 % (ref 0–5)
IMMATURE GRANULOCYTES %: 1 % (ref 0–5)
IMMATURE GRANULOCYTES %: 1 % (ref 0–5)
IMMATURE GRANULOCYTES %: 1.1 % (ref 0–5)
IMMATURE GRANULOCYTES %: 1.1 % (ref 0–5)
IMMATURE GRANULOCYTES %: 1.3 % (ref 0–5)
IMMATURE GRANULOCYTES %: 1.5 % (ref 0–5)
IMMATURE GRANULOCYTES %: 1.7 % (ref 0–5)
IMMATURE RETIC FRACT: 12.4 % (ref 3–15.9)
INFLUENZA A BY PCR: NOT DETECTED
INFLUENZA B BY PCR: NOT DETECTED
INR BLD: 1
INR BLD: 1.1
INR BLD: 1.2
INR BLD: 1.2
INR BLD: 1.4
INR BLD: 1.6
INR BLD: 1.7
INR BLD: 1.8
INR BLD: 1.8
INR BLD: 2.1
INR BLD: 2.2
INR BLD: 2.4
INR BLD: 2.6
INR BLD: 2.9
INR BLD: 3
INR BLD: 3.7
INR BLD: 3.8
INR BLD: 4.2
INR BLD: 4.8
INR BLD: 5.8
INR BLD: 7.8
INR BLD: >10
IRON SATURATION: 22 % (ref 15–50)
IRON: 53 MCG/DL (ref 37–145)
KETONES, URINE: NEGATIVE MG/DL
KLEBSIELLA OXYTOCA BY PCR: NOT DETECTED
KLEBSIELLA PNEUMONIAE GROUP BY PCR: NOT DETECTED
LAB: ABNORMAL
LAB: ABNORMAL
LACTATE DEHYDROGENASE: 170 U/L (ref 135–214)
LACTATE DEHYDROGENASE: 184 U/L (ref 135–214)
LACTATE DEHYDROGENASE: 242 U/L (ref 135–214)
LACTATE DEHYDROGENASE: 281 U/L (ref 135–214)
LACTIC ACID, SEPSIS: 1 MMOL/L (ref 0.5–1.9)
LACTIC ACID, SEPSIS: 1.5 MMOL/L (ref 0.5–1.9)
LACTIC ACID: 0.8 MMOL/L (ref 0.5–2.2)
LACTIC ACID: 1.3 MMOL/L (ref 0.5–2.2)
LACTIC ACID: 1.6 MMOL/L (ref 0.5–2.2)
LACTIC ACID: 2.6 MMOL/L (ref 0.5–2.2)
LDL CHOLESTEROL CALCULATED: 87 MG/DL (ref 0–99)
LEUKOCYTE ESTERASE, URINE: ABNORMAL
LEUKOCYTE ESTERASE, URINE: ABNORMAL
LEUKOCYTE ESTERASE, URINE: NEGATIVE
LISTERIA MONOCYTOGENES BY PCR: NOT DETECTED
LYMPHOCYTES ABSOLUTE: 0.85 E9/L (ref 1.5–4)
LYMPHOCYTES ABSOLUTE: 0.86 E9/L (ref 1.5–4)
LYMPHOCYTES ABSOLUTE: 0.89 E9/L (ref 1.5–4)
LYMPHOCYTES ABSOLUTE: 0.96 E9/L (ref 1.5–4)
LYMPHOCYTES ABSOLUTE: 1.27 E9/L (ref 1.5–4)
LYMPHOCYTES ABSOLUTE: 1.32 E9/L (ref 1.5–4)
LYMPHOCYTES ABSOLUTE: 1.36 E9/L (ref 1.5–4)
LYMPHOCYTES ABSOLUTE: 1.51 E9/L (ref 1.5–4)
LYMPHOCYTES ABSOLUTE: 1.56 E9/L (ref 1.5–4)
LYMPHOCYTES ABSOLUTE: 1.57 E9/L (ref 1.5–4)
LYMPHOCYTES ABSOLUTE: 1.76 E9/L (ref 1.5–4)
LYMPHOCYTES ABSOLUTE: 1.8 E9/L (ref 1.5–4)
LYMPHOCYTES ABSOLUTE: 1.81 E9/L (ref 1.5–4)
LYMPHOCYTES ABSOLUTE: 1.99 E9/L (ref 1.5–4)
LYMPHOCYTES ABSOLUTE: 2.13 E9/L (ref 1.5–4)
LYMPHOCYTES ABSOLUTE: 2.13 E9/L (ref 1.5–4)
LYMPHOCYTES ABSOLUTE: 2.24 E9/L (ref 1.5–4)
LYMPHOCYTES ABSOLUTE: 2.27 E9/L (ref 1.5–4)
LYMPHOCYTES ABSOLUTE: 2.56 E9/L (ref 1.5–4)
LYMPHOCYTES ABSOLUTE: 2.59 E9/L (ref 1.5–4)
LYMPHOCYTES ABSOLUTE: 2.9 E9/L (ref 1.5–4)
LYMPHOCYTES RELATIVE PERCENT: 11.4 % (ref 20–42)
LYMPHOCYTES RELATIVE PERCENT: 12.5 % (ref 20–42)
LYMPHOCYTES RELATIVE PERCENT: 12.7 % (ref 20–42)
LYMPHOCYTES RELATIVE PERCENT: 12.7 % (ref 20–42)
LYMPHOCYTES RELATIVE PERCENT: 12.9 % (ref 20–42)
LYMPHOCYTES RELATIVE PERCENT: 13.8 % (ref 20–42)
LYMPHOCYTES RELATIVE PERCENT: 14.6 % (ref 20–42)
LYMPHOCYTES RELATIVE PERCENT: 15.3 % (ref 20–42)
LYMPHOCYTES RELATIVE PERCENT: 17 % (ref 20–42)
LYMPHOCYTES RELATIVE PERCENT: 18.8 % (ref 20–42)
LYMPHOCYTES RELATIVE PERCENT: 19.4 % (ref 20–42)
LYMPHOCYTES RELATIVE PERCENT: 19.9 % (ref 20–42)
LYMPHOCYTES RELATIVE PERCENT: 23.7 % (ref 20–42)
LYMPHOCYTES RELATIVE PERCENT: 24.5 % (ref 20–42)
LYMPHOCYTES RELATIVE PERCENT: 28.6 % (ref 20–42)
LYMPHOCYTES RELATIVE PERCENT: 28.6 % (ref 20–42)
LYMPHOCYTES RELATIVE PERCENT: 30.8 % (ref 20–42)
LYMPHOCYTES RELATIVE PERCENT: 33.3 % (ref 20–42)
LYMPHOCYTES RELATIVE PERCENT: 5.7 % (ref 20–42)
LYMPHOCYTES RELATIVE PERCENT: 6.1 % (ref 20–42)
LYMPHOCYTES RELATIVE PERCENT: 8.4 % (ref 20–42)
Lab: ABNORMAL
Lab: ABNORMAL
MAGNESIUM: 1.4 MG/DL (ref 1.6–2.6)
MAGNESIUM: 1.6 MG/DL (ref 1.6–2.6)
MAGNESIUM: 1.6 MG/DL (ref 1.6–2.6)
MAGNESIUM: 1.7 MG/DL (ref 1.6–2.6)
MAGNESIUM: 1.7 MG/DL (ref 1.6–2.6)
MCH RBC QN AUTO: 26.7 PG (ref 26–35)
MCH RBC QN AUTO: 26.9 PG (ref 26–35)
MCH RBC QN AUTO: 26.9 PG (ref 26–35)
MCH RBC QN AUTO: 27 PG (ref 26–35)
MCH RBC QN AUTO: 27.1 PG (ref 26–35)
MCH RBC QN AUTO: 27.2 PG (ref 26–35)
MCH RBC QN AUTO: 27.3 PG (ref 26–35)
MCH RBC QN AUTO: 27.4 PG (ref 26–35)
MCH RBC QN AUTO: 27.6 PG (ref 26–35)
MCH RBC QN AUTO: 27.6 PG (ref 26–35)
MCH RBC QN AUTO: 27.7 PG (ref 26–35)
MCH RBC QN AUTO: 27.9 PG (ref 26–35)
MCH RBC QN AUTO: 28.6 PG (ref 26–35)
MCHC RBC AUTO-ENTMCNC: 30.5 % (ref 32–34.5)
MCHC RBC AUTO-ENTMCNC: 31.5 % (ref 32–34.5)
MCHC RBC AUTO-ENTMCNC: 31.6 % (ref 32–34.5)
MCHC RBC AUTO-ENTMCNC: 31.7 % (ref 32–34.5)
MCHC RBC AUTO-ENTMCNC: 31.9 % (ref 32–34.5)
MCHC RBC AUTO-ENTMCNC: 31.9 % (ref 32–34.5)
MCHC RBC AUTO-ENTMCNC: 32 % (ref 32–34.5)
MCHC RBC AUTO-ENTMCNC: 32.2 % (ref 32–34.5)
MCHC RBC AUTO-ENTMCNC: 32.2 % (ref 32–34.5)
MCHC RBC AUTO-ENTMCNC: 32.4 % (ref 32–34.5)
MCHC RBC AUTO-ENTMCNC: 32.5 % (ref 32–34.5)
MCHC RBC AUTO-ENTMCNC: 32.8 % (ref 32–34.5)
MCHC RBC AUTO-ENTMCNC: 32.9 % (ref 32–34.5)
MCHC RBC AUTO-ENTMCNC: 33 % (ref 32–34.5)
MCHC RBC AUTO-ENTMCNC: 33 % (ref 32–34.5)
MCHC RBC AUTO-ENTMCNC: 33.2 % (ref 32–34.5)
MCHC RBC AUTO-ENTMCNC: 33.9 % (ref 32–34.5)
MCV RBC AUTO: 81.5 FL (ref 80–99.9)
MCV RBC AUTO: 81.7 FL (ref 80–99.9)
MCV RBC AUTO: 81.8 FL (ref 80–99.9)
MCV RBC AUTO: 82 FL (ref 80–99.9)
MCV RBC AUTO: 82.1 FL (ref 80–99.9)
MCV RBC AUTO: 83.3 FL (ref 80–99.9)
MCV RBC AUTO: 83.3 FL (ref 80–99.9)
MCV RBC AUTO: 83.5 FL (ref 80–99.9)
MCV RBC AUTO: 83.8 FL (ref 80–99.9)
MCV RBC AUTO: 84 FL (ref 80–99.9)
MCV RBC AUTO: 84.1 FL (ref 80–99.9)
MCV RBC AUTO: 84.4 FL (ref 80–99.9)
MCV RBC AUTO: 84.7 FL (ref 80–99.9)
MCV RBC AUTO: 84.8 FL (ref 80–99.9)
MCV RBC AUTO: 84.8 FL (ref 80–99.9)
MCV RBC AUTO: 85.2 FL (ref 80–99.9)
MCV RBC AUTO: 85.3 FL (ref 80–99.9)
MCV RBC AUTO: 85.7 FL (ref 80–99.9)
MCV RBC AUTO: 85.8 FL (ref 80–99.9)
MCV RBC AUTO: 86.3 FL (ref 80–99.9)
MCV RBC AUTO: 86.3 FL (ref 80–99.9)
MCV RBC AUTO: 88.8 FL (ref 80–99.9)
MCV RBC AUTO: 88.8 FL (ref 80–99.9)
METER GLUCOSE: 102 MG/DL (ref 74–99)
METER GLUCOSE: 106 MG/DL (ref 74–99)
METER GLUCOSE: 108 MG/DL (ref 74–99)
METER GLUCOSE: 115 MG/DL (ref 74–99)
METER GLUCOSE: 120 MG/DL (ref 74–99)
METER GLUCOSE: 126 MG/DL (ref 74–99)
METER GLUCOSE: 135 MG/DL (ref 74–99)
METER GLUCOSE: 138 MG/DL (ref 74–99)
METER GLUCOSE: 139 MG/DL (ref 74–99)
METER GLUCOSE: 140 MG/DL (ref 74–99)
METER GLUCOSE: 143 MG/DL (ref 74–99)
METER GLUCOSE: 144 MG/DL (ref 74–99)
METER GLUCOSE: 148 MG/DL (ref 74–99)
METER GLUCOSE: 152 MG/DL (ref 74–99)
METER GLUCOSE: 153 MG/DL (ref 74–99)
METER GLUCOSE: 153 MG/DL (ref 74–99)
METER GLUCOSE: 154 MG/DL (ref 74–99)
METER GLUCOSE: 154 MG/DL (ref 74–99)
METER GLUCOSE: 159 MG/DL (ref 74–99)
METER GLUCOSE: 162 MG/DL (ref 74–99)
METER GLUCOSE: 163 MG/DL (ref 74–99)
METER GLUCOSE: 164 MG/DL (ref 74–99)
METER GLUCOSE: 165 MG/DL (ref 74–99)
METER GLUCOSE: 168 MG/DL (ref 74–99)
METER GLUCOSE: 173 MG/DL (ref 74–99)
METER GLUCOSE: 175 MG/DL (ref 74–99)
METER GLUCOSE: 176 MG/DL (ref 74–99)
METER GLUCOSE: 177 MG/DL (ref 74–99)
METER GLUCOSE: 177 MG/DL (ref 74–99)
METER GLUCOSE: 178 MG/DL (ref 74–99)
METER GLUCOSE: 185 MG/DL (ref 74–99)
METER GLUCOSE: 185 MG/DL (ref 74–99)
METER GLUCOSE: 186 MG/DL (ref 74–99)
METER GLUCOSE: 188 MG/DL (ref 74–99)
METER GLUCOSE: 188 MG/DL (ref 74–99)
METER GLUCOSE: 196 MG/DL (ref 74–99)
METER GLUCOSE: 198 MG/DL (ref 74–99)
METER GLUCOSE: 198 MG/DL (ref 74–99)
METER GLUCOSE: 199 MG/DL (ref 74–99)
METER GLUCOSE: 205 MG/DL (ref 74–99)
METER GLUCOSE: 213 MG/DL (ref 74–99)
METER GLUCOSE: 217 MG/DL (ref 74–99)
METER GLUCOSE: 219 MG/DL (ref 74–99)
METER GLUCOSE: 234 MG/DL (ref 74–99)
METER GLUCOSE: 239 MG/DL (ref 74–99)
METER GLUCOSE: 241 MG/DL (ref 74–99)
METER GLUCOSE: 243 MG/DL (ref 74–99)
METER GLUCOSE: 257 MG/DL (ref 74–99)
METER GLUCOSE: 275 MG/DL (ref 74–99)
METER GLUCOSE: 312 MG/DL (ref 74–99)
METER GLUCOSE: 442 MG/DL (ref 74–99)
METER GLUCOSE: 61 MG/DL (ref 74–99)
METER GLUCOSE: 63 MG/DL (ref 74–99)
METER GLUCOSE: 66 MG/DL (ref 74–99)
METER GLUCOSE: 67 MG/DL (ref 74–99)
METER GLUCOSE: 82 MG/DL (ref 74–99)
METER GLUCOSE: 86 MG/DL (ref 74–99)
METER GLUCOSE: 99 MG/DL (ref 74–99)
METER GLUCOSE: <40 MG/DL (ref 74–99)
METHB: 0.1 % (ref 0–1.5)
METHB: 0.2 % (ref 0–1.5)
MODE: ABNORMAL
MODE: ABNORMAL
MONOCYTES ABSOLUTE: 0.2 E9/L (ref 0.1–0.95)
MONOCYTES ABSOLUTE: 0.24 E9/L (ref 0.1–0.95)
MONOCYTES ABSOLUTE: 0.28 E9/L (ref 0.1–0.95)
MONOCYTES ABSOLUTE: 0.41 E9/L (ref 0.1–0.95)
MONOCYTES ABSOLUTE: 0.47 E9/L (ref 0.1–0.95)
MONOCYTES ABSOLUTE: 0.54 E9/L (ref 0.1–0.95)
MONOCYTES ABSOLUTE: 0.67 E9/L (ref 0.1–0.95)
MONOCYTES ABSOLUTE: 0.7 E9/L (ref 0.1–0.95)
MONOCYTES ABSOLUTE: 0.87 E9/L (ref 0.1–0.95)
MONOCYTES ABSOLUTE: 0.91 E9/L (ref 0.1–0.95)
MONOCYTES ABSOLUTE: 0.92 E9/L (ref 0.1–0.95)
MONOCYTES ABSOLUTE: 0.94 E9/L (ref 0.1–0.95)
MONOCYTES ABSOLUTE: 0.98 E9/L (ref 0.1–0.95)
MONOCYTES ABSOLUTE: 0.99 E9/L (ref 0.1–0.95)
MONOCYTES ABSOLUTE: 1 E9/L (ref 0.1–0.95)
MONOCYTES ABSOLUTE: 1.03 E9/L (ref 0.1–0.95)
MONOCYTES ABSOLUTE: 1.03 E9/L (ref 0.1–0.95)
MONOCYTES ABSOLUTE: 1.09 E9/L (ref 0.1–0.95)
MONOCYTES ABSOLUTE: 1.14 E9/L (ref 0.1–0.95)
MONOCYTES ABSOLUTE: 1.18 E9/L (ref 0.1–0.95)
MONOCYTES ABSOLUTE: 1.38 E9/L (ref 0.1–0.95)
MONOCYTES RELATIVE PERCENT: 10.8 % (ref 2–12)
MONOCYTES RELATIVE PERCENT: 13.4 % (ref 2–12)
MONOCYTES RELATIVE PERCENT: 3.3 % (ref 2–12)
MONOCYTES RELATIVE PERCENT: 3.6 % (ref 2–12)
MONOCYTES RELATIVE PERCENT: 4.4 % (ref 2–12)
MONOCYTES RELATIVE PERCENT: 4.9 % (ref 2–12)
MONOCYTES RELATIVE PERCENT: 5.1 % (ref 2–12)
MONOCYTES RELATIVE PERCENT: 5.5 % (ref 2–12)
MONOCYTES RELATIVE PERCENT: 5.6 % (ref 2–12)
MONOCYTES RELATIVE PERCENT: 6.1 % (ref 2–12)
MONOCYTES RELATIVE PERCENT: 6.1 % (ref 2–12)
MONOCYTES RELATIVE PERCENT: 6.3 % (ref 2–12)
MONOCYTES RELATIVE PERCENT: 7.6 % (ref 2–12)
MONOCYTES RELATIVE PERCENT: 7.8 % (ref 2–12)
MONOCYTES RELATIVE PERCENT: 8.4 % (ref 2–12)
MONOCYTES RELATIVE PERCENT: 8.6 % (ref 2–12)
MONOCYTES RELATIVE PERCENT: 8.7 % (ref 2–12)
MONOCYTES RELATIVE PERCENT: 9.7 % (ref 2–12)
MONOCYTES RELATIVE PERCENT: 9.8 % (ref 2–12)
MYCOPLASMA PNEUMONIAE BY PCR: NOT DETECTED
NEISSERIA MENINGITIDIS BY PCR: NOT DETECTED
NEUTROPHILS ABSOLUTE: 10.06 E9/L (ref 1.8–7.3)
NEUTROPHILS ABSOLUTE: 10.4 E9/L (ref 1.8–7.3)
NEUTROPHILS ABSOLUTE: 12.62 E9/L (ref 1.8–7.3)
NEUTROPHILS ABSOLUTE: 13.38 E9/L (ref 1.8–7.3)
NEUTROPHILS ABSOLUTE: 13.48 E9/L (ref 1.8–7.3)
NEUTROPHILS ABSOLUTE: 15.19 E9/L (ref 1.8–7.3)
NEUTROPHILS ABSOLUTE: 17.9 E9/L (ref 1.8–7.3)
NEUTROPHILS ABSOLUTE: 2.1 E9/L (ref 1.8–7.3)
NEUTROPHILS ABSOLUTE: 2.65 E9/L (ref 1.8–7.3)
NEUTROPHILS ABSOLUTE: 2.77 E9/L (ref 1.8–7.3)
NEUTROPHILS ABSOLUTE: 3.48 E9/L (ref 1.8–7.3)
NEUTROPHILS ABSOLUTE: 5.1 E9/L (ref 1.8–7.3)
NEUTROPHILS ABSOLUTE: 5.89 E9/L (ref 1.8–7.3)
NEUTROPHILS ABSOLUTE: 6.02 E9/L (ref 1.8–7.3)
NEUTROPHILS ABSOLUTE: 7.23 E9/L (ref 1.8–7.3)
NEUTROPHILS ABSOLUTE: 7.62 E9/L (ref 1.8–7.3)
NEUTROPHILS ABSOLUTE: 7.79 E9/L (ref 1.8–7.3)
NEUTROPHILS ABSOLUTE: 8.36 E9/L (ref 1.8–7.3)
NEUTROPHILS ABSOLUTE: 8.8 E9/L (ref 1.8–7.3)
NEUTROPHILS ABSOLUTE: 9.65 E9/L (ref 1.8–7.3)
NEUTROPHILS ABSOLUTE: 9.76 E9/L (ref 1.8–7.3)
NEUTROPHILS RELATIVE PERCENT: 55.1 % (ref 43–80)
NEUTROPHILS RELATIVE PERCENT: 57.1 % (ref 43–80)
NEUTROPHILS RELATIVE PERCENT: 59.4 % (ref 43–80)
NEUTROPHILS RELATIVE PERCENT: 61.5 % (ref 43–80)
NEUTROPHILS RELATIVE PERCENT: 62.7 % (ref 43–80)
NEUTROPHILS RELATIVE PERCENT: 67.6 % (ref 43–80)
NEUTROPHILS RELATIVE PERCENT: 69.1 % (ref 43–80)
NEUTROPHILS RELATIVE PERCENT: 69.9 % (ref 43–80)
NEUTROPHILS RELATIVE PERCENT: 70.9 % (ref 43–80)
NEUTROPHILS RELATIVE PERCENT: 71.6 % (ref 43–80)
NEUTROPHILS RELATIVE PERCENT: 74.4 % (ref 43–80)
NEUTROPHILS RELATIVE PERCENT: 74.8 % (ref 43–80)
NEUTROPHILS RELATIVE PERCENT: 75.8 % (ref 43–80)
NEUTROPHILS RELATIVE PERCENT: 78.2 % (ref 43–80)
NEUTROPHILS RELATIVE PERCENT: 79.1 % (ref 43–80)
NEUTROPHILS RELATIVE PERCENT: 80.3 % (ref 43–80)
NEUTROPHILS RELATIVE PERCENT: 80.6 % (ref 43–80)
NEUTROPHILS RELATIVE PERCENT: 81.3 % (ref 43–80)
NEUTROPHILS RELATIVE PERCENT: 85.5 % (ref 43–80)
NEUTROPHILS RELATIVE PERCENT: 89.5 % (ref 43–80)
NEUTROPHILS RELATIVE PERCENT: 89.8 % (ref 43–80)
NITRITE, URINE: NEGATIVE
O2 CONTENT: 16.2 ML/DL
O2 CONTENT: 17.1 ML/DL
O2 SATURATION: 68 % (ref 92–98.5)
O2 SATURATION: 77.6 % (ref 92–98.5)
O2 SATURATION: 82.5 % (ref 92–98.5)
O2 SATURATION: 86.3 % (ref 92–98.5)
O2 SATURATION: 92.7 % (ref 92–98.5)
O2HB: 82.2 % (ref 94–97)
O2HB: 91.4 % (ref 94–97)
OPERATOR ID: 208
OPERATOR ID: 208
OPERATOR ID: 9097
OPERATOR ID: ABNORMAL
OPERATOR ID: ABNORMAL
ORDER NUMBER: ABNORMAL
ORGANISM: ABNORMAL
PARAINFLUENZA VIRUS 1 BY PCR: NOT DETECTED
PARAINFLUENZA VIRUS 2 BY PCR: NOT DETECTED
PARAINFLUENZA VIRUS 3 BY PCR: NOT DETECTED
PARAINFLUENZA VIRUS 4 BY PCR: NOT DETECTED
PATIENT TEMP: 37 C
PATIENT TEMP: 37 C
PCO2 ARTERIAL: 32 MMHG (ref 35–45)
PCO2 ARTERIAL: 32.5 MMHG (ref 35–45)
PCO2 ARTERIAL: 36.6 MMHG (ref 35–45)
PCO2: 32.3 MMHG (ref 35–45)
PCO2: 37.3 MMHG (ref 35–45)
PDW BLD-RTO: 12.8 FL (ref 11.5–15)
PDW BLD-RTO: 13.2 FL (ref 11.5–15)
PDW BLD-RTO: 14.3 FL (ref 11.5–15)
PDW BLD-RTO: 14.3 FL (ref 11.5–15)
PDW BLD-RTO: 14.4 FL (ref 11.5–15)
PDW BLD-RTO: 14.5 FL (ref 11.5–15)
PDW BLD-RTO: 14.6 FL (ref 11.5–15)
PDW BLD-RTO: 14.7 FL (ref 11.5–15)
PDW BLD-RTO: 15 FL (ref 11.5–15)
PDW BLD-RTO: 15 FL (ref 11.5–15)
PDW BLD-RTO: 15.1 FL (ref 11.5–15)
PDW BLD-RTO: 15.2 FL (ref 11.5–15)
PDW BLD-RTO: 15.5 FL (ref 11.5–15)
PDW BLD-RTO: 15.7 FL (ref 11.5–15)
PDW BLD-RTO: 15.7 FL (ref 11.5–15)
PDW BLD-RTO: 15.9 FL (ref 11.5–15)
PDW BLD-RTO: 16.3 FL (ref 11.5–15)
PH BLOOD GAS: 7.35 (ref 7.35–7.45)
PH BLOOD GAS: 7.4 (ref 7.35–7.45)
PH BLOOD GAS: 7.44 (ref 7.35–7.45)
PH BLOOD GAS: 7.46 (ref 7.35–7.45)
PH BLOOD GAS: 7.49 (ref 7.35–7.45)
PH UA: 6 (ref 5–9)
PH UA: 6.5 (ref 5–9)
PH UA: 7 (ref 5–9)
PLATELET # BLD: 226 E9/L (ref 130–450)
PLATELET # BLD: 249 E9/L (ref 130–450)
PLATELET # BLD: 255 E9/L (ref 130–450)
PLATELET # BLD: 266 E9/L (ref 130–450)
PLATELET # BLD: 267 E9/L (ref 130–450)
PLATELET # BLD: 276 E9/L (ref 130–450)
PLATELET # BLD: 282 E9/L (ref 130–450)
PLATELET # BLD: 288 E9/L (ref 130–450)
PLATELET # BLD: 294 E9/L (ref 130–450)
PLATELET # BLD: 295 E9/L (ref 130–450)
PLATELET # BLD: 297 E9/L (ref 130–450)
PLATELET # BLD: 299 E9/L (ref 130–450)
PLATELET # BLD: 303 E9/L (ref 130–450)
PLATELET # BLD: 322 E9/L (ref 130–450)
PLATELET # BLD: 323 E9/L (ref 130–450)
PLATELET # BLD: 329 E9/L (ref 130–450)
PLATELET # BLD: 345 E9/L (ref 130–450)
PLATELET # BLD: 353 E9/L (ref 130–450)
PLATELET # BLD: 368 E9/L (ref 130–450)
PLATELET # BLD: 388 E9/L (ref 130–450)
PLATELET # BLD: 405 E9/L (ref 130–450)
PLATELET # BLD: 407 E9/L (ref 130–450)
PLATELET # BLD: 423 E9/L (ref 130–450)
PMV BLD AUTO: 10 FL (ref 7–12)
PMV BLD AUTO: 10.1 FL (ref 7–12)
PMV BLD AUTO: 10.2 FL (ref 7–12)
PMV BLD AUTO: 10.2 FL (ref 7–12)
PMV BLD AUTO: 10.3 FL (ref 7–12)
PMV BLD AUTO: 10.5 FL (ref 7–12)
PMV BLD AUTO: 10.5 FL (ref 7–12)
PMV BLD AUTO: 10.6 FL (ref 7–12)
PMV BLD AUTO: 10.7 FL (ref 7–12)
PMV BLD AUTO: 10.8 FL (ref 7–12)
PMV BLD AUTO: 11.1 FL (ref 7–12)
PMV BLD AUTO: 11.1 FL (ref 7–12)
PMV BLD AUTO: 11.3 FL (ref 7–12)
PMV BLD AUTO: 9.7 FL (ref 7–12)
PMV BLD AUTO: 9.7 FL (ref 7–12)
PMV BLD AUTO: 9.8 FL (ref 7–12)
PMV BLD AUTO: 9.8 FL (ref 7–12)
PO2 ARTERIAL: 33 MMHG (ref 60–80)
PO2 ARTERIAL: 39.9 MMHG (ref 60–80)
PO2 ARTERIAL: 54.2 MMHG (ref 60–80)
PO2: 42.9 MMHG (ref 75–100)
PO2: 65.9 MMHG (ref 75–100)
POTASSIUM REFLEX MAGNESIUM: 2.6 MMOL/L (ref 3.5–5)
POTASSIUM REFLEX MAGNESIUM: 3.2 MMOL/L (ref 3.5–5)
POTASSIUM REFLEX MAGNESIUM: 3.3 MMOL/L (ref 3.5–5)
POTASSIUM REFLEX MAGNESIUM: 3.3 MMOL/L (ref 3.5–5)
POTASSIUM REFLEX MAGNESIUM: 3.5 MMOL/L (ref 3.5–5)
POTASSIUM REFLEX MAGNESIUM: 4.4 MMOL/L (ref 3.5–5)
POTASSIUM REFLEX MAGNESIUM: 4.5 MMOL/L (ref 3.5–5)
POTASSIUM REFLEX MAGNESIUM: 4.7 MMOL/L (ref 3.5–5)
POTASSIUM SERPL-SCNC: 3.3 MMOL/L (ref 3.5–5)
POTASSIUM SERPL-SCNC: 3.3 MMOL/L (ref 3.5–5)
POTASSIUM SERPL-SCNC: 3.5 MMOL/L (ref 3.5–5)
POTASSIUM SERPL-SCNC: 3.7 MMOL/L (ref 3.5–5)
POTASSIUM SERPL-SCNC: 3.8 MMOL/L (ref 3.5–5)
POTASSIUM SERPL-SCNC: 3.9 MMOL/L (ref 3.5–5)
POTASSIUM SERPL-SCNC: 3.9 MMOL/L (ref 3.5–5)
POTASSIUM SERPL-SCNC: 4.1 MMOL/L (ref 3.5–5)
POTASSIUM SERPL-SCNC: 4.2 MMOL/L (ref 3.5–5)
POTASSIUM SERPL-SCNC: 4.3 MMOL/L (ref 3.5–5)
POTASSIUM SERPL-SCNC: 4.3 MMOL/L (ref 3.5–5)
POTASSIUM SERPL-SCNC: 4.4 MMOL/L (ref 3.5–5)
POTASSIUM SERPL-SCNC: 5.2 MMOL/L (ref 3.5–5)
PRO-BNP: 151 PG/ML (ref 0–450)
PRO-BNP: 212 PG/ML (ref 0–450)
PROCALCITONIN: 0.6 NG/ML (ref 0–0.08)
PROCALCITONIN: 0.79 NG/ML (ref 0–0.08)
PROTEIN UA: 100 MG/DL
PROTEIN UA: >=300 MG/DL
PROTEIN UA: >=300 MG/DL
PROTEUS BY PCR: NOT DETECTED
PROTHROMBIN TIME: 11.9 SEC (ref 9.3–12.4)
PROTHROMBIN TIME: 12 SEC (ref 9.3–12.4)
PROTHROMBIN TIME: 12.2 SEC (ref 9.3–12.4)
PROTHROMBIN TIME: 13 SEC (ref 9.3–12.4)
PROTHROMBIN TIME: 13.5 SEC (ref 9.3–12.4)
PROTHROMBIN TIME: 14.1 SEC (ref 9.3–12.4)
PROTHROMBIN TIME: 15.5 SEC (ref 9.3–12.4)
PROTHROMBIN TIME: 18.1 SEC (ref 9.3–12.4)
PROTHROMBIN TIME: 19.1 SEC (ref 9.3–12.4)
PROTHROMBIN TIME: 20.5 SEC (ref 9.3–12.4)
PROTHROMBIN TIME: 21.2 SEC (ref 9.3–12.4)
PROTHROMBIN TIME: 24.1 SEC (ref 9.3–12.4)
PROTHROMBIN TIME: 25.8 SEC (ref 9.3–12.4)
PROTHROMBIN TIME: 27.1 SEC (ref 9.3–12.4)
PROTHROMBIN TIME: 29.3 SEC (ref 9.3–12.4)
PROTHROMBIN TIME: 32.9 SEC (ref 9.3–12.4)
PROTHROMBIN TIME: 34.2 SEC (ref 9.3–12.4)
PROTHROMBIN TIME: 42.2 SEC (ref 9.3–12.4)
PROTHROMBIN TIME: 42.9 SEC (ref 9.3–12.4)
PROTHROMBIN TIME: 48.1 SEC (ref 9.3–12.4)
PROTHROMBIN TIME: 55.4 SEC (ref 9.3–12.4)
PROTHROMBIN TIME: 66.4 SEC (ref 9.3–12.4)
PROTHROMBIN TIME: 89.7 SEC (ref 9.3–12.4)
PROTHROMBIN TIME: >120 SEC (ref 9.3–12.4)
PSEUDOMONAS AERUGINOSA BY PCR: NOT DETECTED
RBC # BLD: 3.64 E12/L (ref 3.5–5.5)
RBC # BLD: 3.69 E12/L (ref 3.5–5.5)
RBC # BLD: 3.89 E12/L (ref 3.5–5.5)
RBC # BLD: 4.01 E12/L (ref 3.5–5.5)
RBC # BLD: 4.02 E12/L (ref 3.5–5.5)
RBC # BLD: 4.06 E12/L (ref 3.5–5.5)
RBC # BLD: 4.23 E12/L (ref 3.5–5.5)
RBC # BLD: 4.24 E12/L (ref 3.5–5.5)
RBC # BLD: 4.25 E12/L (ref 3.5–5.5)
RBC # BLD: 4.29 E12/L (ref 3.5–5.5)
RBC # BLD: 4.31 E12/L (ref 3.5–5.5)
RBC # BLD: 4.31 E12/L (ref 3.5–5.5)
RBC # BLD: 4.32 E12/L (ref 3.5–5.5)
RBC # BLD: 4.34 E12/L (ref 3.5–5.5)
RBC # BLD: 4.35 E12/L (ref 3.5–5.5)
RBC # BLD: 4.43 E12/L (ref 3.5–5.5)
RBC # BLD: 4.49 E12/L (ref 3.5–5.5)
RBC # BLD: 4.58 E12/L (ref 3.5–5.5)
RBC # BLD: 4.64 E12/L (ref 3.5–5.5)
RBC # BLD: 4.75 E12/L (ref 3.5–5.5)
RBC # BLD: 4.95 E12/L (ref 3.5–5.5)
RBC # BLD: 5.06 E12/L (ref 3.5–5.5)
RBC # BLD: 5.35 E12/L (ref 3.5–5.5)
RBC UA: ABNORMAL /HPF (ref 0–2)
REASON FOR REJECTION: NORMAL
REJECTED TEST: NORMAL
RESPIRATORY SYNCYTIAL VIRUS BY PCR: NOT DETECTED
RETIC HGB EQUIVALENT: 31.7 PG (ref 28.2–36.6)
RETICULOCYTE ABSOLUTE COUNT: 0.06 E12/L
RETICULOCYTE COUNT PCT: 1.8 % (ref 0.4–1.9)
SARS-COV-2 ANTIBODY, TOTAL: NORMAL
SARS-COV-2 ANTIBODY, TOTAL: REACTIVE
SARS-COV-2, NAAT: NOT DETECTED
SARS-COV-2, NAAT: NOT DETECTED
SARS-COV-2, PCR: DETECTED
SARS-COV-2, PCR: DETECTED
SARS-COV-2, PCR: NOT DETECTED
SEDIMENTATION RATE, ERYTHROCYTE: 111 MM/HR (ref 0–20)
SERRATIA MARCESCENS BY PCR: NOT DETECTED
SODIUM BLD-SCNC: 125 MMOL/L (ref 132–146)
SODIUM BLD-SCNC: 125 MMOL/L (ref 132–146)
SODIUM BLD-SCNC: 127 MMOL/L (ref 132–146)
SODIUM BLD-SCNC: 127 MMOL/L (ref 132–146)
SODIUM BLD-SCNC: 128 MMOL/L (ref 132–146)
SODIUM BLD-SCNC: 129 MMOL/L (ref 132–146)
SODIUM BLD-SCNC: 130 MMOL/L (ref 132–146)
SODIUM BLD-SCNC: 131 MMOL/L (ref 132–146)
SODIUM BLD-SCNC: 131 MMOL/L (ref 132–146)
SODIUM BLD-SCNC: 132 MMOL/L (ref 132–146)
SODIUM BLD-SCNC: 132 MMOL/L (ref 132–146)
SODIUM BLD-SCNC: 133 MMOL/L (ref 132–146)
SODIUM BLD-SCNC: 133 MMOL/L (ref 132–146)
SODIUM BLD-SCNC: 134 MMOL/L (ref 132–146)
SODIUM BLD-SCNC: 135 MMOL/L (ref 132–146)
SODIUM BLD-SCNC: 139 MMOL/L (ref 132–146)
SODIUM BLD-SCNC: 141 MMOL/L (ref 132–146)
SODIUM BLD-SCNC: 143 MMOL/L (ref 132–146)
SOURCE OF BLOOD CULTURE: ABNORMAL
SOURCE, BLOOD GAS: ABNORMAL
SPECIFIC GRAVITY UA: 1.01 (ref 1–1.03)
SPECIFIC GRAVITY UA: 1.02 (ref 1–1.03)
SPECIFIC GRAVITY UA: 1.02 (ref 1–1.03)
STAPHYLOCOCCUS AUREUS BY PCR: NOT DETECTED
STAPHYLOCOCCUS SPECIES BY PCR: NOT DETECTED
STREPTOCOCCUS AGALACTIAE BY PCR: NOT DETECTED
STREPTOCOCCUS PNEUMONIAE BY PCR: NOT DETECTED
STREPTOCOCCUS PYOGENES  BY PCR: NOT DETECTED
STREPTOCOCCUS SPECIES BY PCR: DETECTED
THB: 13.3 G/DL (ref 11.5–16.5)
THB: 14.1 G/DL (ref 11.5–16.5)
TIME ANALYZED: 1656
TIME ANALYZED: 2034
TOTAL CK: 150 U/L (ref 20–180)
TOTAL CK: 25 U/L (ref 20–180)
TOTAL IRON BINDING CAPACITY: 243 MCG/DL (ref 250–450)
TOTAL PROTEIN: 5.6 G/DL (ref 6.4–8.3)
TOTAL PROTEIN: 5.6 G/DL (ref 6.4–8.3)
TOTAL PROTEIN: 5.7 G/DL (ref 6.4–8.3)
TOTAL PROTEIN: 5.8 G/DL (ref 6.4–8.3)
TOTAL PROTEIN: 5.9 G/DL (ref 6.4–8.3)
TOTAL PROTEIN: 6 G/DL (ref 6.4–8.3)
TOTAL PROTEIN: 6 G/DL (ref 6.4–8.3)
TOTAL PROTEIN: 6.1 G/DL (ref 6.4–8.3)
TOTAL PROTEIN: 6.2 G/DL (ref 6.4–8.3)
TOTAL PROTEIN: 6.3 G/DL (ref 6.4–8.3)
TOTAL PROTEIN: 6.7 G/DL (ref 6.4–8.3)
TOTAL PROTEIN: 6.8 G/DL (ref 6.4–8.3)
TOTAL PROTEIN: 6.9 G/DL (ref 6.4–8.3)
TRIGL SERPL-MCNC: 131 MG/DL (ref 0–149)
TROPONIN: <0.01 NG/ML (ref 0–0.03)
TSH SERPL DL<=0.05 MIU/L-ACNC: 0.94 UIU/ML (ref 0.27–4.2)
TSH SERPL DL<=0.05 MIU/L-ACNC: 1.18 UIU/ML (ref 0.27–4.2)
URINE CULTURE, ROUTINE: ABNORMAL
URINE CULTURE, ROUTINE: NORMAL
UROBILINOGEN, URINE: 0.2 E.U./DL
VANCOMYCIN RESISTANT BY PCR: DETECTED
VANCOMYCIN TROUGH: 10.7 MCG/ML (ref 5–16)
VANCOMYCIN TROUGH: 12.5 MCG/ML (ref 5–16)
VITAMIN D 25-HYDROXY: 17 NG/ML (ref 30–100)
VLDLC SERPL CALC-MCNC: 26 MG/DL
WBC # BLD: 10.1 E9/L (ref 4.5–11.5)
WBC # BLD: 10.2 E9/L (ref 4.5–11.5)
WBC # BLD: 10.3 E9/L (ref 4.5–11.5)
WBC # BLD: 10.9 E9/L (ref 4.5–11.5)
WBC # BLD: 11.3 E9/L (ref 4.5–11.5)
WBC # BLD: 11.7 E9/L (ref 4.5–11.5)
WBC # BLD: 11.8 E9/L (ref 4.5–11.5)
WBC # BLD: 12.3 E9/L (ref 4.5–11.5)
WBC # BLD: 12.5 E9/L (ref 4.5–11.5)
WBC # BLD: 13.1 E9/L (ref 4.5–11.5)
WBC # BLD: 13.8 E9/L (ref 4.5–11.5)
WBC # BLD: 14.1 E9/L (ref 4.5–11.5)
WBC # BLD: 14.9 E9/L (ref 4.5–11.5)
WBC # BLD: 16.5 E9/L (ref 4.5–11.5)
WBC # BLD: 16.7 E9/L (ref 4.5–11.5)
WBC # BLD: 18.7 E9/L (ref 4.5–11.5)
WBC # BLD: 20.9 E9/L (ref 4.5–11.5)
WBC # BLD: 3.8 E9/L (ref 4.5–11.5)
WBC # BLD: 3.9 E9/L (ref 4.5–11.5)
WBC # BLD: 4.4 E9/L (ref 4.5–11.5)
WBC # BLD: 4.6 E9/L (ref 4.5–11.5)
WBC # BLD: 8.9 E9/L (ref 4.5–11.5)
WBC # BLD: 9.6 E9/L (ref 4.5–11.5)
WBC UA: >20 /HPF (ref 0–5)
WBC UA: ABNORMAL /HPF (ref 0–5)
WBC UA: ABNORMAL /HPF (ref 0–5)

## 2020-01-01 PROCEDURE — 6360000002 HC RX W HCPCS: Performed by: INTERNAL MEDICINE

## 2020-01-01 PROCEDURE — 99284 EMERGENCY DEPT VISIT MOD MDM: CPT

## 2020-01-01 PROCEDURE — 6370000000 HC RX 637 (ALT 250 FOR IP): Performed by: INTERNAL MEDICINE

## 2020-01-01 PROCEDURE — 97110 THERAPEUTIC EXERCISES: CPT

## 2020-01-01 PROCEDURE — 1200000000 HC SEMI PRIVATE

## 2020-01-01 PROCEDURE — 85610 PROTHROMBIN TIME: CPT

## 2020-01-01 PROCEDURE — 99285 EMERGENCY DEPT VISIT HI MDM: CPT

## 2020-01-01 PROCEDURE — 85730 THROMBOPLASTIN TIME PARTIAL: CPT

## 2020-01-01 PROCEDURE — 80053 COMPREHEN METABOLIC PANEL: CPT

## 2020-01-01 PROCEDURE — 36415 COLL VENOUS BLD VENIPUNCTURE: CPT

## 2020-01-01 PROCEDURE — 6360000002 HC RX W HCPCS: Performed by: SPECIALIST

## 2020-01-01 PROCEDURE — 71046 X-RAY EXAM CHEST 2 VIEWS: CPT

## 2020-01-01 PROCEDURE — 84443 ASSAY THYROID STIM HORMONE: CPT

## 2020-01-01 PROCEDURE — 6370000000 HC RX 637 (ALT 250 FOR IP): Performed by: CLINICAL NURSE SPECIALIST

## 2020-01-01 PROCEDURE — 72100 X-RAY EXAM L-S SPINE 2/3 VWS: CPT

## 2020-01-01 PROCEDURE — 6360000002 HC RX W HCPCS: Performed by: EMERGENCY MEDICINE

## 2020-01-01 PROCEDURE — 85025 COMPLETE CBC W/AUTO DIFF WBC: CPT

## 2020-01-01 PROCEDURE — 87088 URINE BACTERIA CULTURE: CPT

## 2020-01-01 PROCEDURE — 85384 FIBRINOGEN ACTIVITY: CPT

## 2020-01-01 PROCEDURE — 94640 AIRWAY INHALATION TREATMENT: CPT

## 2020-01-01 PROCEDURE — XW13325 TRANSFUSION OF CONVALESCENT PLASMA (NONAUTOLOGOUS) INTO PERIPHERAL VEIN, PERCUTANEOUS APPROACH, NEW TECHNOLOGY GROUP 5: ICD-10-PCS | Performed by: INTERNAL MEDICINE

## 2020-01-01 PROCEDURE — 87040 BLOOD CULTURE FOR BACTERIA: CPT

## 2020-01-01 PROCEDURE — 83540 ASSAY OF IRON: CPT

## 2020-01-01 PROCEDURE — 6370000000 HC RX 637 (ALT 250 FOR IP): Performed by: EMERGENCY MEDICINE

## 2020-01-01 PROCEDURE — 2580000003 HC RX 258: Performed by: INTERNAL MEDICINE

## 2020-01-01 PROCEDURE — B24BZZ4 ULTRASONOGRAPHY OF HEART WITH AORTA, TRANSESOPHAGEAL: ICD-10-PCS | Performed by: STUDENT IN AN ORGANIZED HEALTH CARE EDUCATION/TRAINING PROGRAM

## 2020-01-01 PROCEDURE — 82962 GLUCOSE BLOOD TEST: CPT

## 2020-01-01 PROCEDURE — 84132 ASSAY OF SERUM POTASSIUM: CPT

## 2020-01-01 PROCEDURE — 97530 THERAPEUTIC ACTIVITIES: CPT

## 2020-01-01 PROCEDURE — 6370000000 HC RX 637 (ALT 250 FOR IP): Performed by: SPECIALIST

## 2020-01-01 PROCEDURE — 87077 CULTURE AEROBIC IDENTIFY: CPT

## 2020-01-01 PROCEDURE — 2580000003 HC RX 258: Performed by: SPECIALIST

## 2020-01-01 PROCEDURE — 84484 ASSAY OF TROPONIN QUANT: CPT

## 2020-01-01 PROCEDURE — U0002 COVID-19 LAB TEST NON-CDC: HCPCS

## 2020-01-01 PROCEDURE — 2580000003 HC RX 258: Performed by: PHYSICIAN ASSISTANT

## 2020-01-01 PROCEDURE — 2700000000 HC OXYGEN THERAPY PER DAY

## 2020-01-01 PROCEDURE — 72220 X-RAY EXAM SACRUM TAILBONE: CPT

## 2020-01-01 PROCEDURE — 96374 THER/PROPH/DIAG INJ IV PUSH: CPT

## 2020-01-01 PROCEDURE — 2580000003 HC RX 258: Performed by: NURSE ANESTHETIST, CERTIFIED REGISTERED

## 2020-01-01 PROCEDURE — 97535 SELF CARE MNGMENT TRAINING: CPT

## 2020-01-01 PROCEDURE — 2500000003 HC RX 250 WO HCPCS: Performed by: SPECIALIST

## 2020-01-01 PROCEDURE — 85651 RBC SED RATE NONAUTOMATED: CPT

## 2020-01-01 PROCEDURE — 97116 GAIT TRAINING THERAPY: CPT

## 2020-01-01 PROCEDURE — 70450 CT HEAD/BRAIN W/O DYE: CPT

## 2020-01-01 PROCEDURE — 0202U NFCT DS 22 TRGT SARS-COV-2: CPT

## 2020-01-01 PROCEDURE — 82803 BLOOD GASES ANY COMBINATION: CPT

## 2020-01-01 PROCEDURE — 93312 ECHO TRANSESOPHAGEAL: CPT | Performed by: STUDENT IN AN ORGANIZED HEALTH CARE EDUCATION/TRAINING PROGRAM

## 2020-01-01 PROCEDURE — 94660 CPAP INITIATION&MGMT: CPT

## 2020-01-01 PROCEDURE — 80048 BASIC METABOLIC PNL TOTAL CA: CPT

## 2020-01-01 PROCEDURE — 86140 C-REACTIVE PROTEIN: CPT

## 2020-01-01 PROCEDURE — 82306 VITAMIN D 25 HYDROXY: CPT

## 2020-01-01 PROCEDURE — 87449 NOS EACH ORGANISM AG IA: CPT

## 2020-01-01 PROCEDURE — 83550 IRON BINDING TEST: CPT

## 2020-01-01 PROCEDURE — 2580000003 HC RX 258: Performed by: EMERGENCY MEDICINE

## 2020-01-01 PROCEDURE — 85027 COMPLETE CBC AUTOMATED: CPT

## 2020-01-01 PROCEDURE — 82805 BLOOD GASES W/O2 SATURATION: CPT

## 2020-01-01 PROCEDURE — 93010 ELECTROCARDIOGRAM REPORT: CPT | Performed by: INTERNAL MEDICINE

## 2020-01-01 PROCEDURE — 7100000011 HC PHASE II RECOVERY - ADDTL 15 MIN: Performed by: STUDENT IN AN ORGANIZED HEALTH CARE EDUCATION/TRAINING PROGRAM

## 2020-01-01 PROCEDURE — 93005 ELECTROCARDIOGRAM TRACING: CPT | Performed by: PHYSICIAN ASSISTANT

## 2020-01-01 PROCEDURE — 74177 CT ABD & PELVIS W/CONTRAST: CPT

## 2020-01-01 PROCEDURE — 2060000000 HC ICU INTERMEDIATE R&B

## 2020-01-01 PROCEDURE — 83615 LACTATE (LD) (LDH) ENZYME: CPT

## 2020-01-01 PROCEDURE — 99221 1ST HOSP IP/OBS SF/LOW 40: CPT | Performed by: INTERNAL MEDICINE

## 2020-01-01 PROCEDURE — 82728 ASSAY OF FERRITIN: CPT

## 2020-01-01 PROCEDURE — 83880 ASSAY OF NATRIURETIC PEPTIDE: CPT

## 2020-01-01 PROCEDURE — 80202 ASSAY OF VANCOMYCIN: CPT

## 2020-01-01 PROCEDURE — 7100000010 HC PHASE II RECOVERY - FIRST 15 MIN: Performed by: STUDENT IN AN ORGANIZED HEALTH CARE EDUCATION/TRAINING PROGRAM

## 2020-01-01 PROCEDURE — 94664 DEMO&/EVAL PT USE INHALER: CPT

## 2020-01-01 PROCEDURE — 3700000001 HC ADD 15 MINUTES (ANESTHESIA): Performed by: STUDENT IN AN ORGANIZED HEALTH CARE EDUCATION/TRAINING PROGRAM

## 2020-01-01 PROCEDURE — XW033E5 INTRODUCTION OF REMDESIVIR ANTI-INFECTIVE INTO PERIPHERAL VEIN, PERCUTANEOUS APPROACH, NEW TECHNOLOGY GROUP 5: ICD-10-PCS | Performed by: INTERNAL MEDICINE

## 2020-01-01 PROCEDURE — 83605 ASSAY OF LACTIC ACID: CPT

## 2020-01-01 PROCEDURE — 84145 PROCALCITONIN (PCT): CPT

## 2020-01-01 PROCEDURE — 80061 LIPID PANEL: CPT

## 2020-01-01 PROCEDURE — 85378 FIBRIN DEGRADE SEMIQUANT: CPT

## 2020-01-01 PROCEDURE — 71045 X-RAY EXAM CHEST 1 VIEW: CPT

## 2020-01-01 PROCEDURE — 81001 URINALYSIS AUTO W/SCOPE: CPT

## 2020-01-01 PROCEDURE — 72072 X-RAY EXAM THORAC SPINE 3VWS: CPT

## 2020-01-01 PROCEDURE — 72128 CT CHEST SPINE W/O DYE: CPT

## 2020-01-01 PROCEDURE — 83735 ASSAY OF MAGNESIUM: CPT

## 2020-01-01 PROCEDURE — 87186 SC STD MICRODIL/AGAR DIL: CPT

## 2020-01-01 PROCEDURE — G0378 HOSPITAL OBSERVATION PER HR: HCPCS

## 2020-01-01 PROCEDURE — 93312 ECHO TRANSESOPHAGEAL: CPT

## 2020-01-01 PROCEDURE — 2500000003 HC RX 250 WO HCPCS: Performed by: EMERGENCY MEDICINE

## 2020-01-01 PROCEDURE — 2709999900 HC NON-CHARGEABLE SUPPLY: Performed by: STUDENT IN AN ORGANIZED HEALTH CARE EDUCATION/TRAINING PROGRAM

## 2020-01-01 PROCEDURE — 51702 INSERT TEMP BLADDER CATH: CPT

## 2020-01-01 PROCEDURE — 93005 ELECTROCARDIOGRAM TRACING: CPT | Performed by: EMERGENCY MEDICINE

## 2020-01-01 PROCEDURE — 86900 BLOOD TYPING SEROLOGIC ABO: CPT

## 2020-01-01 PROCEDURE — 86769 SARS-COV-2 COVID-19 ANTIBODY: CPT

## 2020-01-01 PROCEDURE — 93325 DOPPLER ECHO COLOR FLOW MAPG: CPT

## 2020-01-01 PROCEDURE — 82550 ASSAY OF CK (CPK): CPT

## 2020-01-01 PROCEDURE — 87150 DNA/RNA AMPLIFIED PROBE: CPT

## 2020-01-01 PROCEDURE — 6360000002 HC RX W HCPCS

## 2020-01-01 PROCEDURE — 71110 X-RAY EXAM RIBS BIL 3 VIEWS: CPT

## 2020-01-01 PROCEDURE — 82140 ASSAY OF AMMONIA: CPT

## 2020-01-01 PROCEDURE — 86850 RBC ANTIBODY SCREEN: CPT

## 2020-01-01 PROCEDURE — 97165 OT EVAL LOW COMPLEX 30 MIN: CPT

## 2020-01-01 PROCEDURE — 72131 CT LUMBAR SPINE W/O DYE: CPT

## 2020-01-01 PROCEDURE — 76937 US GUIDE VASCULAR ACCESS: CPT

## 2020-01-01 PROCEDURE — 6360000002 HC RX W HCPCS: Performed by: NURSE ANESTHETIST, CERTIFIED REGISTERED

## 2020-01-01 PROCEDURE — 72125 CT NECK SPINE W/O DYE: CPT

## 2020-01-01 PROCEDURE — 97116 GAIT TRAINING THERAPY: CPT | Performed by: PHYSICAL THERAPIST

## 2020-01-01 PROCEDURE — 86901 BLOOD TYPING SEROLOGIC RH(D): CPT

## 2020-01-01 PROCEDURE — 6360000002 HC RX W HCPCS: Performed by: PHYSICIAN ASSISTANT

## 2020-01-01 PROCEDURE — 97161 PT EVAL LOW COMPLEX 20 MIN: CPT | Performed by: PHYSICAL THERAPIST

## 2020-01-01 PROCEDURE — 87324 CLOSTRIDIUM AG IA: CPT

## 2020-01-01 PROCEDURE — 85045 AUTOMATED RETICULOCYTE COUNT: CPT

## 2020-01-01 PROCEDURE — 3700000000 HC ANESTHESIA ATTENDED CARE: Performed by: STUDENT IN AN ORGANIZED HEALTH CARE EDUCATION/TRAINING PROGRAM

## 2020-01-01 PROCEDURE — 97530 THERAPEUTIC ACTIVITIES: CPT | Performed by: PHYSICAL THERAPIST

## 2020-01-01 PROCEDURE — 97162 PT EVAL MOD COMPLEX 30 MIN: CPT | Performed by: PHYSICAL THERAPIST

## 2020-01-01 RX ORDER — PHYTONADIONE 5 MG/1
10 TABLET ORAL ONCE
Status: COMPLETED | OUTPATIENT
Start: 2020-01-01 | End: 2020-01-01

## 2020-01-01 RX ORDER — 0.9 % SODIUM CHLORIDE 0.9 %
30 INTRAVENOUS SOLUTION INTRAVENOUS PRN
Status: DISCONTINUED | OUTPATIENT
Start: 2020-01-01 | End: 2020-01-01 | Stop reason: HOSPADM

## 2020-01-01 RX ORDER — MORPHINE SULFATE 2 MG/ML
2 INJECTION, SOLUTION INTRAMUSCULAR; INTRAVENOUS
Status: DISCONTINUED | OUTPATIENT
Start: 2020-01-01 | End: 2020-01-01 | Stop reason: HOSPADM

## 2020-01-01 RX ORDER — IPRATROPIUM BROMIDE AND ALBUTEROL SULFATE 2.5; .5 MG/3ML; MG/3ML
1 SOLUTION RESPIRATORY (INHALATION) ONCE
Status: COMPLETED | OUTPATIENT
Start: 2020-01-01 | End: 2020-01-01

## 2020-01-01 RX ORDER — HALOPERIDOL 5 MG/ML
2 INJECTION INTRAMUSCULAR EVERY 8 HOURS PRN
Status: DISCONTINUED | OUTPATIENT
Start: 2020-01-01 | End: 2020-01-01

## 2020-01-01 RX ORDER — LOSARTAN POTASSIUM 50 MG/1
50 TABLET ORAL NIGHTLY
Status: DISCONTINUED | OUTPATIENT
Start: 2020-01-01 | End: 2020-01-01 | Stop reason: HOSPADM

## 2020-01-01 RX ORDER — BENZONATATE 100 MG/1
100 CAPSULE ORAL 3 TIMES DAILY PRN
Status: DISCONTINUED | OUTPATIENT
Start: 2020-01-01 | End: 2020-01-01 | Stop reason: HOSPADM

## 2020-01-01 RX ORDER — OYSTER SHELL CALCIUM WITH VITAMIN D 500; 200 MG/1; [IU]/1
2 TABLET, FILM COATED ORAL 2 TIMES DAILY
Status: DISCONTINUED | OUTPATIENT
Start: 2020-01-01 | End: 2020-01-01 | Stop reason: HOSPADM

## 2020-01-01 RX ORDER — PROPOFOL 10 MG/ML
INJECTION, EMULSION INTRAVENOUS PRN
Status: DISCONTINUED | OUTPATIENT
Start: 2020-01-01 | End: 2020-01-01 | Stop reason: SDUPTHER

## 2020-01-01 RX ORDER — ROSUVASTATIN CALCIUM 20 MG/1
20 TABLET, COATED ORAL NIGHTLY
Status: ON HOLD | COMMUNITY
End: 2020-01-01 | Stop reason: HOSPADM

## 2020-01-01 RX ORDER — MORPHINE SULFATE 2 MG/ML
2 INJECTION, SOLUTION INTRAMUSCULAR; INTRAVENOUS
Status: DISCONTINUED | OUTPATIENT
Start: 2020-01-01 | End: 2020-01-01 | Stop reason: CLARIF

## 2020-01-01 RX ORDER — 0.9 % SODIUM CHLORIDE 0.9 %
1000 INTRAVENOUS SOLUTION INTRAVENOUS ONCE
Status: COMPLETED | OUTPATIENT
Start: 2020-01-01 | End: 2020-01-01

## 2020-01-01 RX ORDER — 0.9 % SODIUM CHLORIDE 0.9 %
20 INTRAVENOUS SOLUTION INTRAVENOUS ONCE
Status: DISCONTINUED | OUTPATIENT
Start: 2020-01-01 | End: 2020-01-01 | Stop reason: HOSPADM

## 2020-01-01 RX ORDER — AMLODIPINE BESYLATE 5 MG/1
5 TABLET ORAL DAILY
Status: DISCONTINUED | OUTPATIENT
Start: 2020-01-01 | End: 2020-01-01 | Stop reason: HOSPADM

## 2020-01-01 RX ORDER — LINEZOLID 600 MG/1
600 TABLET, FILM COATED ORAL EVERY 12 HOURS SCHEDULED
Status: DISCONTINUED | OUTPATIENT
Start: 2020-01-01 | End: 2020-01-01 | Stop reason: HOSPADM

## 2020-01-01 RX ORDER — CALORIC SUPPLEMENT
1 LIQUID (ML) ORAL 3 TIMES DAILY
COMMUNITY

## 2020-01-01 RX ORDER — ROSUVASTATIN CALCIUM 10 MG/1
20 TABLET, COATED ORAL NIGHTLY
Status: DISCONTINUED | OUTPATIENT
Start: 2020-01-01 | End: 2020-01-01 | Stop reason: HOSPADM

## 2020-01-01 RX ORDER — L. ACIDOPHILUS/L.BULGARICUS 1MM CELL
4 TABLET ORAL 3 TIMES DAILY
Status: DISCONTINUED | OUTPATIENT
Start: 2020-01-01 | End: 2020-01-01 | Stop reason: CLARIF

## 2020-01-01 RX ORDER — CLOTRIMAZOLE AND BETAMETHASONE DIPROPIONATE 10; .64 MG/G; MG/G
CREAM TOPICAL 2 TIMES DAILY
Status: DISCONTINUED | OUTPATIENT
Start: 2020-01-01 | End: 2020-01-01 | Stop reason: HOSPADM

## 2020-01-01 RX ORDER — VANCOMYCIN HYDROCHLORIDE 125 MG/1
125 CAPSULE ORAL 4 TIMES DAILY
COMMUNITY

## 2020-01-01 RX ORDER — SODIUM CHLORIDE 9 MG/ML
25 INJECTION, SOLUTION INTRAVENOUS EVERY 8 HOURS
Status: DISCONTINUED | OUTPATIENT
Start: 2020-01-01 | End: 2020-01-01 | Stop reason: HOSPADM

## 2020-01-01 RX ORDER — GUAIFENESIN/DEXTROMETHORPHAN 100-10MG/5
5 SYRUP ORAL EVERY 4 HOURS PRN
Status: DISCONTINUED | OUTPATIENT
Start: 2020-01-01 | End: 2020-01-01 | Stop reason: ALTCHOICE

## 2020-01-01 RX ORDER — FENTANYL CITRATE 50 UG/ML
12.5 INJECTION, SOLUTION INTRAMUSCULAR; INTRAVENOUS EVERY 6 HOURS PRN
Status: DISCONTINUED | OUTPATIENT
Start: 2020-01-01 | End: 2020-01-01

## 2020-01-01 RX ORDER — GLIMEPIRIDE 4 MG/1
2 TABLET ORAL
Status: DISCONTINUED | OUTPATIENT
Start: 2020-01-01 | End: 2020-01-01

## 2020-01-01 RX ORDER — LEVOTHYROXINE SODIUM 137 UG/1
135 TABLET ORAL DAILY
Status: DISCONTINUED | OUTPATIENT
Start: 2020-01-01 | End: 2020-01-01 | Stop reason: HOSPADM

## 2020-01-01 RX ORDER — DEXAMETHASONE SODIUM PHOSPHATE 4 MG/ML
4 INJECTION, SOLUTION INTRA-ARTICULAR; INTRALESIONAL; INTRAMUSCULAR; INTRAVENOUS; SOFT TISSUE EVERY 12 HOURS
Status: DISCONTINUED | OUTPATIENT
Start: 2020-01-01 | End: 2020-01-01

## 2020-01-01 RX ORDER — ONDANSETRON 4 MG/1
4 TABLET, ORALLY DISINTEGRATING ORAL EVERY 8 HOURS PRN
Status: DISCONTINUED | OUTPATIENT
Start: 2020-01-01 | End: 2020-01-01 | Stop reason: HOSPADM

## 2020-01-01 RX ORDER — LORAZEPAM 2 MG/ML
0.5 INJECTION INTRAMUSCULAR EVERY 12 HOURS PRN
Status: DISCONTINUED | OUTPATIENT
Start: 2020-01-01 | End: 2020-01-01

## 2020-01-01 RX ORDER — WARFARIN SODIUM 5 MG/1
5 TABLET ORAL DAILY
Status: DISCONTINUED | OUTPATIENT
Start: 2020-01-01 | End: 2020-01-01 | Stop reason: HOSPADM

## 2020-01-01 RX ORDER — WARFARIN SODIUM 7.5 MG/1
7.5 TABLET ORAL EVERY EVENING
Status: DISCONTINUED | OUTPATIENT
Start: 2020-01-01 | End: 2020-01-01

## 2020-01-01 RX ORDER — WARFARIN SODIUM 5 MG/1
5 TABLET ORAL EVERY EVENING
COMMUNITY

## 2020-01-01 RX ORDER — DEXTROSE MONOHYDRATE 50 MG/ML
100 INJECTION, SOLUTION INTRAVENOUS PRN
Status: DISCONTINUED | OUTPATIENT
Start: 2020-01-01 | End: 2020-01-01 | Stop reason: HOSPADM

## 2020-01-01 RX ORDER — NICOTINE POLACRILEX 4 MG
15 LOZENGE BUCCAL PRN
Status: DISCONTINUED | OUTPATIENT
Start: 2020-01-01 | End: 2020-01-01 | Stop reason: HOSPADM

## 2020-01-01 RX ORDER — SODIUM CHLORIDE 9 MG/ML
INJECTION, SOLUTION INTRAVENOUS CONTINUOUS PRN
Status: DISCONTINUED | OUTPATIENT
Start: 2020-01-01 | End: 2020-01-01 | Stop reason: SDUPTHER

## 2020-01-01 RX ORDER — DEXTROSE MONOHYDRATE 25 G/50ML
12.5 INJECTION, SOLUTION INTRAVENOUS PRN
Status: DISCONTINUED | OUTPATIENT
Start: 2020-01-01 | End: 2020-01-01 | Stop reason: HOSPADM

## 2020-01-01 RX ORDER — LACTOBACILLUS RHAMNOSUS GG 10B CELL
1 CAPSULE ORAL EVERY 12 HOURS
Status: DISCONTINUED | OUTPATIENT
Start: 2020-01-01 | End: 2020-01-01 | Stop reason: HOSPADM

## 2020-01-01 RX ORDER — DEXAMETHASONE 0.5 MG/1
6 TABLET ORAL
Qty: 60 TABLET | Refills: 0 | Status: SHIPPED | OUTPATIENT
Start: 2020-01-01 | End: 2020-01-01

## 2020-01-01 RX ORDER — METOPROLOL SUCCINATE 25 MG/1
50 TABLET, EXTENDED RELEASE ORAL DAILY
Status: DISCONTINUED | OUTPATIENT
Start: 2020-01-01 | End: 2020-01-01 | Stop reason: HOSPADM

## 2020-01-01 RX ORDER — LORAZEPAM 2 MG/ML
0.5 INJECTION INTRAMUSCULAR ONCE
Status: COMPLETED | OUTPATIENT
Start: 2020-01-01 | End: 2020-01-01

## 2020-01-01 RX ORDER — DULOXETIN HYDROCHLORIDE 20 MG/1
40 CAPSULE, DELAYED RELEASE ORAL NIGHTLY
Status: DISCONTINUED | OUTPATIENT
Start: 2020-01-01 | End: 2020-01-01 | Stop reason: HOSPADM

## 2020-01-01 RX ORDER — WARFARIN SODIUM 5 MG/1
TABLET ORAL
Qty: 30 TABLET | Refills: 3 | Status: SHIPPED | OUTPATIENT
Start: 2020-01-01 | End: 2020-01-01 | Stop reason: DRUGHIGH

## 2020-01-01 RX ORDER — METOPROLOL TARTRATE 5 MG/5ML
5 INJECTION INTRAVENOUS ONCE
Status: COMPLETED | OUTPATIENT
Start: 2020-01-01 | End: 2020-01-01

## 2020-01-01 RX ORDER — POTASSIUM CHLORIDE 7.45 MG/ML
10 INJECTION INTRAVENOUS
Status: COMPLETED | OUTPATIENT
Start: 2020-01-01 | End: 2020-01-01

## 2020-01-01 RX ORDER — METOPROLOL SUCCINATE 25 MG/1
25 TABLET, EXTENDED RELEASE ORAL DAILY
Status: DISCONTINUED | OUTPATIENT
Start: 2020-01-01 | End: 2020-01-01

## 2020-01-01 RX ORDER — ACETAMINOPHEN 500 MG
500 TABLET ORAL EVERY 6 HOURS PRN
Status: DISCONTINUED | OUTPATIENT
Start: 2020-01-01 | End: 2020-01-01 | Stop reason: SDUPTHER

## 2020-01-01 RX ORDER — OMEPRAZOLE 20 MG/1
40 CAPSULE, DELAYED RELEASE ORAL DAILY
Status: DISCONTINUED | OUTPATIENT
Start: 2020-01-01 | End: 2020-01-01 | Stop reason: CLARIF

## 2020-01-01 RX ORDER — ONDANSETRON 4 MG/1
4 TABLET, ORALLY DISINTEGRATING ORAL EVERY 8 HOURS PRN
Qty: 10 TABLET | Refills: 0 | Status: ON HOLD | OUTPATIENT
Start: 2020-01-01 | End: 2020-01-01 | Stop reason: HOSPADM

## 2020-01-01 RX ORDER — DEXAMETHASONE SODIUM PHOSPHATE 10 MG/ML
6 INJECTION INTRAMUSCULAR; INTRAVENOUS EVERY 24 HOURS
Status: DISCONTINUED | OUTPATIENT
Start: 2020-01-01 | End: 2020-01-01

## 2020-01-01 RX ORDER — MONTELUKAST SODIUM 10 MG/1
10 TABLET ORAL NIGHTLY
Status: DISCONTINUED | OUTPATIENT
Start: 2020-01-01 | End: 2020-01-01 | Stop reason: HOSPADM

## 2020-01-01 RX ORDER — ONDANSETRON 2 MG/ML
4 INJECTION INTRAMUSCULAR; INTRAVENOUS ONCE
Status: COMPLETED | OUTPATIENT
Start: 2020-01-01 | End: 2020-01-01

## 2020-01-01 RX ORDER — SODIUM CHLORIDE 9 MG/ML
INJECTION, SOLUTION INTRAVENOUS CONTINUOUS
Status: DISCONTINUED | OUTPATIENT
Start: 2020-01-01 | End: 2020-01-01 | Stop reason: HOSPADM

## 2020-01-01 RX ORDER — SODIUM CHLORIDE 9 MG/ML
INJECTION, SOLUTION INTRAVENOUS CONTINUOUS
Status: DISCONTINUED | OUTPATIENT
Start: 2020-01-01 | End: 2020-01-01

## 2020-01-01 RX ORDER — CALORIC SUPPLEMENT
1 LIQUID (ML) ORAL 2 TIMES DAILY
Status: DISCONTINUED | OUTPATIENT
Start: 2020-01-01 | End: 2020-01-01 | Stop reason: CLARIF

## 2020-01-01 RX ORDER — CALCIUM CARBONATE/VITAMIN D3 600 MG-10
1 TABLET ORAL 2 TIMES DAILY
Status: ON HOLD | COMMUNITY
End: 2020-01-01 | Stop reason: HOSPADM

## 2020-01-01 RX ORDER — FENTANYL CITRATE 50 UG/ML
12.5 INJECTION, SOLUTION INTRAMUSCULAR; INTRAVENOUS
Status: DISCONTINUED | OUTPATIENT
Start: 2020-01-01 | End: 2020-01-01

## 2020-01-01 RX ORDER — GLIMEPIRIDE 2 MG/1
2 TABLET ORAL
Status: DISCONTINUED | OUTPATIENT
Start: 2020-01-01 | End: 2020-01-01 | Stop reason: HOSPADM

## 2020-01-01 RX ORDER — MORPHINE SULFATE 4 MG/ML
4 INJECTION, SOLUTION INTRAMUSCULAR; INTRAVENOUS
Status: DISCONTINUED | OUTPATIENT
Start: 2020-01-01 | End: 2020-01-01 | Stop reason: HOSPADM

## 2020-01-01 RX ORDER — DEXAMETHASONE SODIUM PHOSPHATE 4 MG/ML
4 INJECTION, SOLUTION INTRA-ARTICULAR; INTRALESIONAL; INTRAMUSCULAR; INTRAVENOUS; SOFT TISSUE EVERY 24 HOURS
Status: DISCONTINUED | OUTPATIENT
Start: 2020-01-01 | End: 2020-01-01 | Stop reason: ALTCHOICE

## 2020-01-01 RX ORDER — MULTIVITAMIN WITH IRON
1 TABLET ORAL DAILY
Qty: 30 TABLET | Refills: 3 | Status: SHIPPED | OUTPATIENT
Start: 2020-01-01

## 2020-01-01 RX ORDER — HALOPERIDOL 5 MG/ML
2 INJECTION INTRAMUSCULAR EVERY 6 HOURS PRN
Status: DISCONTINUED | OUTPATIENT
Start: 2020-01-01 | End: 2020-01-01

## 2020-01-01 RX ORDER — IPRATROPIUM BROMIDE AND ALBUTEROL SULFATE 2.5; .5 MG/3ML; MG/3ML
3 SOLUTION RESPIRATORY (INHALATION) EVERY 6 HOURS PRN
Status: DISCONTINUED | OUTPATIENT
Start: 2020-01-01 | End: 2020-01-01 | Stop reason: CLARIF

## 2020-01-01 RX ORDER — ACETAMINOPHEN 325 MG/1
650 TABLET ORAL EVERY 6 HOURS PRN
Status: DISCONTINUED | OUTPATIENT
Start: 2020-01-01 | End: 2020-01-01 | Stop reason: HOSPADM

## 2020-01-01 RX ORDER — GUAIFENESIN 100 MG/5ML
200 SOLUTION ORAL EVERY 4 HOURS PRN
Status: DISCONTINUED | OUTPATIENT
Start: 2020-01-01 | End: 2020-01-01 | Stop reason: HOSPADM

## 2020-01-01 RX ORDER — ACETAMINOPHEN 500 MG
500 TABLET ORAL EVERY 6 HOURS PRN
Status: DISCONTINUED | OUTPATIENT
Start: 2020-01-01 | End: 2020-01-01 | Stop reason: HOSPADM

## 2020-01-01 RX ORDER — METRONIDAZOLE 500 MG/1
500 TABLET ORAL 2 TIMES DAILY
Status: DISCONTINUED | OUTPATIENT
Start: 2020-01-01 | End: 2020-01-01

## 2020-01-01 RX ORDER — 0.9 % SODIUM CHLORIDE 0.9 %
500 INTRAVENOUS SOLUTION INTRAVENOUS ONCE
Status: COMPLETED | OUTPATIENT
Start: 2020-01-01 | End: 2020-01-01

## 2020-01-01 RX ORDER — ROSUVASTATIN CALCIUM 10 MG/1
20 TABLET, COATED ORAL NIGHTLY
Status: DISCONTINUED | OUTPATIENT
Start: 2020-01-01 | End: 2020-01-01

## 2020-01-01 RX ORDER — ZINC GLUCONATE 50 MG
50 TABLET ORAL DAILY
Status: DISCONTINUED | OUTPATIENT
Start: 2020-01-01 | End: 2020-01-01 | Stop reason: HOSPADM

## 2020-01-01 RX ORDER — IPRATROPIUM BROMIDE AND ALBUTEROL SULFATE 2.5; .5 MG/3ML; MG/3ML
3 SOLUTION RESPIRATORY (INHALATION) 4 TIMES DAILY
Status: DISCONTINUED | OUTPATIENT
Start: 2020-01-01 | End: 2020-01-01 | Stop reason: HOSPADM

## 2020-01-01 RX ORDER — MORPHINE SULFATE 4 MG/ML
4 INJECTION, SOLUTION INTRAMUSCULAR; INTRAVENOUS
Status: DISCONTINUED | OUTPATIENT
Start: 2020-01-01 | End: 2020-01-01 | Stop reason: SDUPTHER

## 2020-01-01 RX ORDER — LACTOBACILLUS RHAMNOSUS GG 10B CELL
1 CAPSULE ORAL EVERY 12 HOURS
Qty: 60 CAPSULE | Refills: 1 | Status: SHIPPED | OUTPATIENT
Start: 2020-01-01 | End: 2020-12-23

## 2020-01-01 RX ORDER — LINEZOLID 600 MG/1
600 TABLET, FILM COATED ORAL EVERY 12 HOURS SCHEDULED
Qty: 28 TABLET | Refills: 0 | Status: SHIPPED | OUTPATIENT
Start: 2020-01-01 | End: 2020-01-01

## 2020-01-01 RX ORDER — FENTANYL CITRATE 50 UG/ML
INJECTION, SOLUTION INTRAMUSCULAR; INTRAVENOUS
Status: COMPLETED
Start: 2020-01-01 | End: 2020-01-01

## 2020-01-01 RX ORDER — ACETAMINOPHEN 650 MG/1
650 SUPPOSITORY RECTAL EVERY 6 HOURS PRN
Status: DISCONTINUED | OUTPATIENT
Start: 2020-01-01 | End: 2020-01-01 | Stop reason: HOSPADM

## 2020-01-01 RX ORDER — IPRATROPIUM BROMIDE AND ALBUTEROL SULFATE 2.5; .5 MG/3ML; MG/3ML
3 SOLUTION RESPIRATORY (INHALATION) ONCE
Status: DISCONTINUED | OUTPATIENT
Start: 2020-01-01 | End: 2020-01-01

## 2020-01-01 RX ORDER — HALOPERIDOL 5 MG/ML
1 INJECTION INTRAMUSCULAR 2 TIMES DAILY PRN
Status: DISCONTINUED | OUTPATIENT
Start: 2020-01-01 | End: 2020-01-01 | Stop reason: HOSPADM

## 2020-01-01 RX ORDER — VITAMIN B COMPLEX
3000 TABLET ORAL DAILY
Status: DISCONTINUED | OUTPATIENT
Start: 2020-01-01 | End: 2020-01-01 | Stop reason: HOSPADM

## 2020-01-01 RX ORDER — WARFARIN SODIUM 5 MG/1
5 TABLET ORAL EVERY EVENING
Status: DISCONTINUED | OUTPATIENT
Start: 2020-01-01 | End: 2020-01-01 | Stop reason: HOSPADM

## 2020-01-01 RX ORDER — VITAMIN C
1 TAB ORAL DAILY
Status: DISCONTINUED | OUTPATIENT
Start: 2020-01-01 | End: 2020-01-01 | Stop reason: HOSPADM

## 2020-01-01 RX ORDER — PANTOPRAZOLE SODIUM 40 MG/1
40 TABLET, DELAYED RELEASE ORAL
Status: DISCONTINUED | OUTPATIENT
Start: 2020-01-01 | End: 2020-01-01 | Stop reason: HOSPADM

## 2020-01-01 RX ORDER — ZINC SULFATE 50(220)MG
50 CAPSULE ORAL DAILY
Status: DISCONTINUED | OUTPATIENT
Start: 2020-01-01 | End: 2020-01-01 | Stop reason: HOSPADM

## 2020-01-01 RX ORDER — BUDESONIDE AND FORMOTEROL FUMARATE DIHYDRATE 80; 4.5 UG/1; UG/1
2 AEROSOL RESPIRATORY (INHALATION) 2 TIMES DAILY
Status: DISCONTINUED | OUTPATIENT
Start: 2020-01-01 | End: 2020-01-01 | Stop reason: HOSPADM

## 2020-01-01 RX ORDER — SODIUM CHLORIDE 9 MG/ML
INJECTION, SOLUTION INTRAVENOUS ONCE
Status: COMPLETED | OUTPATIENT
Start: 2020-01-01 | End: 2020-01-01

## 2020-01-01 RX ORDER — LINEZOLID 600 MG/1
600 TABLET, FILM COATED ORAL 2 TIMES DAILY
Status: ON HOLD | COMMUNITY
Start: 2020-01-01 | End: 2020-01-01 | Stop reason: HOSPADM

## 2020-01-01 RX ORDER — ARFORMOTEROL TARTRATE 15 UG/2ML
15 SOLUTION RESPIRATORY (INHALATION) 2 TIMES DAILY
Status: DISCONTINUED | OUTPATIENT
Start: 2020-01-01 | End: 2020-01-01 | Stop reason: HOSPADM

## 2020-01-01 RX ORDER — LORAZEPAM 2 MG/ML
0.5 INJECTION INTRAMUSCULAR EVERY 8 HOURS PRN
Status: DISCONTINUED | OUTPATIENT
Start: 2020-01-01 | End: 2020-01-01 | Stop reason: HOSPADM

## 2020-01-01 RX ORDER — FUROSEMIDE 10 MG/ML
40 INJECTION INTRAMUSCULAR; INTRAVENOUS ONCE
Status: COMPLETED | OUTPATIENT
Start: 2020-01-01 | End: 2020-01-01

## 2020-01-01 RX ORDER — CHOLECALCIFEROL (VITAMIN D3) 25 MCG
3000 TABLET ORAL DAILY
Qty: 60 TABLET | Refills: 3 | Status: SHIPPED | OUTPATIENT
Start: 2020-01-01

## 2020-01-01 RX ORDER — PREDNISONE 50 MG/1
50 TABLET ORAL DAILY
Qty: 2 TABLET | Refills: 0 | Status: SHIPPED | OUTPATIENT
Start: 2020-01-01 | End: 2020-01-01

## 2020-01-01 RX ORDER — SODIUM CHLORIDE 9 MG/ML
25 INJECTION, SOLUTION INTRAVENOUS EVERY 12 HOURS
Status: DISCONTINUED | OUTPATIENT
Start: 2020-01-01 | End: 2020-01-01

## 2020-01-01 RX ORDER — METOPROLOL SUCCINATE 25 MG/1
25 TABLET, EXTENDED RELEASE ORAL DAILY
Status: DISCONTINUED | OUTPATIENT
Start: 2020-01-01 | End: 2020-01-01 | Stop reason: HOSPADM

## 2020-01-01 RX ORDER — VITAMIN B COMPLEX
2000 TABLET ORAL DAILY
Status: DISCONTINUED | OUTPATIENT
Start: 2020-01-01 | End: 2020-01-01 | Stop reason: HOSPADM

## 2020-01-01 RX ORDER — POTASSIUM CHLORIDE 400 MEQ/1000ML
20 INJECTION, SOLUTION INTRAVENOUS ONCE
Status: DISCONTINUED | OUTPATIENT
Start: 2020-01-01 | End: 2020-01-01 | Stop reason: CLARIF

## 2020-01-01 RX ORDER — PROPOFOL 10 MG/ML
INJECTION, EMULSION INTRAVENOUS CONTINUOUS PRN
Status: DISCONTINUED | OUTPATIENT
Start: 2020-01-01 | End: 2020-01-01 | Stop reason: SDUPTHER

## 2020-01-01 RX ORDER — FENTANYL CITRATE 50 UG/ML
50 INJECTION, SOLUTION INTRAMUSCULAR; INTRAVENOUS ONCE
Status: COMPLETED | OUTPATIENT
Start: 2020-01-01 | End: 2020-01-01

## 2020-01-01 RX ORDER — PREDNISONE 20 MG/1
60 TABLET ORAL ONCE
Status: COMPLETED | OUTPATIENT
Start: 2020-01-01 | End: 2020-01-01

## 2020-01-01 RX ORDER — PHYTONADIONE 10 MG/ML
5 INJECTION, EMULSION INTRAMUSCULAR; INTRAVENOUS; SUBCUTANEOUS ONCE
Status: COMPLETED | OUTPATIENT
Start: 2020-01-01 | End: 2020-01-01

## 2020-01-01 RX ORDER — ACETAMINOPHEN 500 MG
500 TABLET ORAL EVERY 6 HOURS PRN
COMMUNITY

## 2020-01-01 RX ORDER — ASCORBIC ACID 500 MG
1000 TABLET ORAL DAILY
Status: DISCONTINUED | OUTPATIENT
Start: 2020-01-01 | End: 2020-01-01 | Stop reason: HOSPADM

## 2020-01-01 RX ORDER — LACTOBACILLUS RHAMNOSUS GG 10B CELL
1 CAPSULE ORAL DAILY
Status: DISCONTINUED | OUTPATIENT
Start: 2020-01-01 | End: 2020-01-01

## 2020-01-01 RX ORDER — POTASSIUM CHLORIDE 20 MEQ/1
40 TABLET, EXTENDED RELEASE ORAL ONCE
Status: COMPLETED | OUTPATIENT
Start: 2020-01-01 | End: 2020-01-01

## 2020-01-01 RX ORDER — MULTIVITAMIN WITH IRON
1 TABLET ORAL DAILY
Status: DISCONTINUED | OUTPATIENT
Start: 2020-01-01 | End: 2020-01-01 | Stop reason: HOSPADM

## 2020-01-01 RX ORDER — WARFARIN SODIUM 7.5 MG/1
7.5 TABLET ORAL NIGHTLY
Status: ON HOLD | COMMUNITY
End: 2020-01-01 | Stop reason: HOSPADM

## 2020-01-01 RX ORDER — SODIUM CHLORIDE 0.9 % (FLUSH) 0.9 %
10 SYRINGE (ML) INJECTION PRN
Status: DISCONTINUED | OUTPATIENT
Start: 2020-01-01 | End: 2020-01-01 | Stop reason: HOSPADM

## 2020-01-01 RX ORDER — FENTANYL CITRATE 50 UG/ML
50 INJECTION, SOLUTION INTRAMUSCULAR; INTRAVENOUS
Status: DISCONTINUED | OUTPATIENT
Start: 2020-01-01 | End: 2020-01-01 | Stop reason: HOSPADM

## 2020-01-01 RX ORDER — BUDESONIDE 0.5 MG/2ML
0.5 INHALANT ORAL 2 TIMES DAILY
Status: DISCONTINUED | OUTPATIENT
Start: 2020-01-01 | End: 2020-01-01 | Stop reason: HOSPADM

## 2020-01-01 RX ORDER — POTASSIUM CHLORIDE 20 MEQ/1
20 TABLET, EXTENDED RELEASE ORAL DAILY
Status: DISPENSED | OUTPATIENT
Start: 2020-01-01 | End: 2020-01-01

## 2020-01-01 RX ORDER — DEXAMETHASONE SODIUM PHOSPHATE 4 MG/ML
4 INJECTION, SOLUTION INTRA-ARTICULAR; INTRALESIONAL; INTRAMUSCULAR; INTRAVENOUS; SOFT TISSUE EVERY 24 HOURS
Status: DISCONTINUED | OUTPATIENT
Start: 2020-01-01 | End: 2020-01-01 | Stop reason: HOSPADM

## 2020-01-01 RX ADMIN — CEFEPIME HYDROCHLORIDE 1 G: 1 INJECTION, POWDER, FOR SOLUTION INTRAMUSCULAR; INTRAVENOUS at 03:23

## 2020-01-01 RX ADMIN — DEXAMETHASONE SODIUM PHOSPHATE 4 MG: 4 INJECTION, SOLUTION INTRAMUSCULAR; INTRAVENOUS at 09:29

## 2020-01-01 RX ADMIN — MONTELUKAST 10 MG: 10 TABLET, FILM COATED ORAL at 21:02

## 2020-01-01 RX ADMIN — AMLODIPINE BESYLATE 5 MG: 5 TABLET ORAL at 12:28

## 2020-01-01 RX ADMIN — SODIUM CHLORIDE 500 ML: 9 INJECTION, SOLUTION INTRAVENOUS at 14:10

## 2020-01-01 RX ADMIN — WARFARIN SODIUM 7.5 MG: 7.5 TABLET ORAL at 18:46

## 2020-01-01 RX ADMIN — SODIUM CHLORIDE: 9 INJECTION, SOLUTION INTRAVENOUS at 18:58

## 2020-01-01 RX ADMIN — IPRATROPIUM BROMIDE AND ALBUTEROL SULFATE 3 ML: .5; 3 SOLUTION RESPIRATORY (INHALATION) at 17:39

## 2020-01-01 RX ADMIN — SODIUM CHLORIDE 1000 ML: 9 INJECTION, SOLUTION INTRAVENOUS at 09:34

## 2020-01-01 RX ADMIN — LINEZOLID 600 MG: 600 TABLET, FILM COATED ORAL at 08:41

## 2020-01-01 RX ADMIN — HALOPERIDOL LACTATE 2 MG: 5 INJECTION, SOLUTION INTRAMUSCULAR at 03:16

## 2020-01-01 RX ADMIN — HALOPERIDOL LACTATE 2 MG: 5 INJECTION, SOLUTION INTRAMUSCULAR at 00:33

## 2020-01-01 RX ADMIN — INSULIN HUMAN 10 UNITS: 100 INJECTION, SOLUTION PARENTERAL at 13:59

## 2020-01-01 RX ADMIN — POTASSIUM CHLORIDE 10 MEQ: 10 INJECTION, SOLUTION INTRAVENOUS at 14:15

## 2020-01-01 RX ADMIN — SODIUM CHLORIDE 1000 ML: 9 INJECTION, SOLUTION INTRAVENOUS at 20:58

## 2020-01-01 RX ADMIN — PIPERACILLIN AND TAZOBACTAM 3.38 G: 3; .375 INJECTION, POWDER, LYOPHILIZED, FOR SOLUTION INTRAVENOUS at 09:27

## 2020-01-01 RX ADMIN — Medication 250 MG: at 11:04

## 2020-01-01 RX ADMIN — SODIUM CHLORIDE: 9 INJECTION, SOLUTION INTRAVENOUS at 22:35

## 2020-01-01 RX ADMIN — FENTANYL CITRATE 12.5 MCG: 50 INJECTION, SOLUTION INTRAMUSCULAR; INTRAVENOUS at 12:50

## 2020-01-01 RX ADMIN — IPRATROPIUM BROMIDE AND ALBUTEROL SULFATE 3 ML: .5; 3 SOLUTION RESPIRATORY (INHALATION) at 09:09

## 2020-01-01 RX ADMIN — PIPERACILLIN AND TAZOBACTAM 3.38 G: 3; .375 INJECTION, POWDER, LYOPHILIZED, FOR SOLUTION INTRAVENOUS at 13:25

## 2020-01-01 RX ADMIN — METOPROLOL SUCCINATE 25 MG: 25 TABLET, EXTENDED RELEASE ORAL at 12:29

## 2020-01-01 RX ADMIN — IPRATROPIUM BROMIDE AND ALBUTEROL SULFATE 3 ML: .5; 3 SOLUTION RESPIRATORY (INHALATION) at 15:23

## 2020-01-01 RX ADMIN — HALOPERIDOL LACTATE 2 MG: 5 INJECTION, SOLUTION INTRAMUSCULAR at 21:01

## 2020-01-01 RX ADMIN — Medication 125 MG: at 16:54

## 2020-01-01 RX ADMIN — LINEZOLID 600 MG: 600 TABLET, FILM COATED ORAL at 08:17

## 2020-01-01 RX ADMIN — Medication 125 MG: at 17:25

## 2020-01-01 RX ADMIN — LEVOTHYROXINE SODIUM 137 MCG: 0.14 TABLET ORAL at 06:16

## 2020-01-01 RX ADMIN — Medication 125 MG: at 00:35

## 2020-01-01 RX ADMIN — WARFARIN SODIUM 7.5 MG: 7.5 TABLET ORAL at 18:00

## 2020-01-01 RX ADMIN — CALCIUM CARBONATE-VITAMIN D TAB 500 MG-200 UNIT 2 TABLET: 500-200 TAB at 07:00

## 2020-01-01 RX ADMIN — Medication 125 MG: at 05:42

## 2020-01-01 RX ADMIN — MORPHINE SULFATE 2 MG: 2 INJECTION, SOLUTION INTRAMUSCULAR; INTRAVENOUS at 13:43

## 2020-01-01 RX ADMIN — FENTANYL CITRATE 12.5 MCG: 50 INJECTION INTRAMUSCULAR; INTRAVENOUS at 18:11

## 2020-01-01 RX ADMIN — FUROSEMIDE 40 MG: 10 INJECTION, SOLUTION INTRAMUSCULAR; INTRAVENOUS at 12:19

## 2020-01-01 RX ADMIN — Medication 1 TABLET: at 08:08

## 2020-01-01 RX ADMIN — SODIUM CHLORIDE 25 ML: 9 INJECTION, SOLUTION INTRAVENOUS at 10:12

## 2020-01-01 RX ADMIN — PIPERACILLIN AND TAZOBACTAM 3.38 G: 3; .375 INJECTION, POWDER, LYOPHILIZED, FOR SOLUTION INTRAVENOUS at 05:37

## 2020-01-01 RX ADMIN — POTASSIUM CHLORIDE 10 MEQ: 10 INJECTION, SOLUTION INTRAVENOUS at 13:13

## 2020-01-01 RX ADMIN — DEXAMETHASONE SODIUM PHOSPHATE 4 MG: 4 INJECTION, SOLUTION INTRA-ARTICULAR; INTRALESIONAL; INTRAMUSCULAR; INTRAVENOUS; SOFT TISSUE at 14:26

## 2020-01-01 RX ADMIN — LORAZEPAM 0.5 MG: 2 INJECTION INTRAMUSCULAR; INTRAVENOUS at 09:36

## 2020-01-01 RX ADMIN — LOSARTAN POTASSIUM 50 MG: 50 TABLET, FILM COATED ORAL at 21:21

## 2020-01-01 RX ADMIN — MONTELUKAST 10 MG: 10 TABLET, FILM COATED ORAL at 22:08

## 2020-01-01 RX ADMIN — CLOTRIMAZOLE AND BETAMETHASONE DIPROPIONATE: 10; .5 CREAM TOPICAL at 21:04

## 2020-01-01 RX ADMIN — Medication 1 CAPSULE: at 09:11

## 2020-01-01 RX ADMIN — CLOTRIMAZOLE AND BETAMETHASONE DIPROPIONATE: 10; .5 CREAM TOPICAL at 09:16

## 2020-01-01 RX ADMIN — SODIUM CHLORIDE 25 ML: 9 INJECTION, SOLUTION INTRAVENOUS at 17:32

## 2020-01-01 RX ADMIN — LOSARTAN POTASSIUM 50 MG: 50 TABLET, FILM COATED ORAL at 20:57

## 2020-01-01 RX ADMIN — METOPROLOL SUCCINATE 25 MG: 25 TABLET, EXTENDED RELEASE ORAL at 09:11

## 2020-01-01 RX ADMIN — Medication 1 CAPSULE: at 08:42

## 2020-01-01 RX ADMIN — Medication 1 CAPSULE: at 21:51

## 2020-01-01 RX ADMIN — Medication 125 MG: at 18:41

## 2020-01-01 RX ADMIN — PANTOPRAZOLE SODIUM 40 MG: 40 TABLET, DELAYED RELEASE ORAL at 10:13

## 2020-01-01 RX ADMIN — METOPROLOL SUCCINATE 25 MG: 25 TABLET, EXTENDED RELEASE ORAL at 09:17

## 2020-01-01 RX ADMIN — CLOTRIMAZOLE AND BETAMETHASONE DIPROPIONATE: 10; .5 CREAM TOPICAL at 09:11

## 2020-01-01 RX ADMIN — IPRATROPIUM BROMIDE AND ALBUTEROL SULFATE 3 ML: .5; 3 SOLUTION RESPIRATORY (INHALATION) at 07:15

## 2020-01-01 RX ADMIN — SODIUM CHLORIDE: 9 INJECTION, SOLUTION INTRAVENOUS at 15:35

## 2020-01-01 RX ADMIN — METOPROLOL SUCCINATE 25 MG: 25 TABLET, EXTENDED RELEASE ORAL at 02:59

## 2020-01-01 RX ADMIN — Medication 125 MG: at 18:00

## 2020-01-01 RX ADMIN — MORPHINE SULFATE 4 MG: 4 INJECTION, SOLUTION INTRAMUSCULAR; INTRAVENOUS at 15:41

## 2020-01-01 RX ADMIN — LORAZEPAM 0.5 MG: 2 INJECTION INTRAMUSCULAR; INTRAVENOUS at 04:00

## 2020-01-01 RX ADMIN — LOSARTAN POTASSIUM 50 MG: 50 TABLET, FILM COATED ORAL at 21:51

## 2020-01-01 RX ADMIN — CLOTRIMAZOLE AND BETAMETHASONE DIPROPIONATE: 10; .5 CREAM TOPICAL at 21:53

## 2020-01-01 RX ADMIN — METOPROLOL TARTRATE 5 MG: 5 INJECTION, SOLUTION INTRAVENOUS at 01:03

## 2020-01-01 RX ADMIN — Medication 1 CAPSULE: at 08:47

## 2020-01-01 RX ADMIN — Medication 1 CAPSULE: at 11:14

## 2020-01-01 RX ADMIN — WARFARIN SODIUM 7.5 MG: 7.5 TABLET ORAL at 19:08

## 2020-01-01 RX ADMIN — DULOXETINE 40 MG: 20 CAPSULE, DELAYED RELEASE ORAL at 20:37

## 2020-01-01 RX ADMIN — ARFORMOTEROL TARTRATE 15 MCG: 15 SOLUTION RESPIRATORY (INHALATION) at 17:39

## 2020-01-01 RX ADMIN — Medication 50 MG: at 09:11

## 2020-01-01 RX ADMIN — Medication 1 TABLET: at 08:43

## 2020-01-01 RX ADMIN — Medication 1 CAPSULE: at 21:21

## 2020-01-01 RX ADMIN — CLOTRIMAZOLE AND BETAMETHASONE DIPROPIONATE: 10; .5 CREAM TOPICAL at 08:07

## 2020-01-01 RX ADMIN — ROSUVASTATIN CALCIUM 20 MG: 10 TABLET, FILM COATED ORAL at 01:49

## 2020-01-01 RX ADMIN — METOPROLOL SUCCINATE 25 MG: 25 TABLET, EXTENDED RELEASE ORAL at 11:14

## 2020-01-01 RX ADMIN — METOPROLOL SUCCINATE 25 MG: 25 TABLET, EXTENDED RELEASE ORAL at 08:42

## 2020-01-01 RX ADMIN — ROSUVASTATIN CALCIUM 20 MG: 10 TABLET, FILM COATED ORAL at 22:32

## 2020-01-01 RX ADMIN — Medication 1 CAPSULE: at 08:16

## 2020-01-01 RX ADMIN — LINEZOLID 600 MG: 600 TABLET, FILM COATED ORAL at 21:28

## 2020-01-01 RX ADMIN — Medication 125 MG: at 23:41

## 2020-01-01 RX ADMIN — Medication 250 MG: at 06:00

## 2020-01-01 RX ADMIN — LOSARTAN POTASSIUM 50 MG: 50 TABLET, FILM COATED ORAL at 21:29

## 2020-01-01 RX ADMIN — DEXAMETHASONE SODIUM PHOSPHATE 4 MG: 4 INJECTION, SOLUTION INTRA-ARTICULAR; INTRALESIONAL; INTRAMUSCULAR; INTRAVENOUS; SOFT TISSUE at 13:44

## 2020-01-01 RX ADMIN — DEXAMETHASONE SODIUM PHOSPHATE 4 MG: 4 INJECTION, SOLUTION INTRA-ARTICULAR; INTRALESIONAL; INTRAMUSCULAR; INTRAVENOUS; SOFT TISSUE at 02:07

## 2020-01-01 RX ADMIN — Medication 125 MG: at 00:28

## 2020-01-01 RX ADMIN — METOPROLOL SUCCINATE 25 MG: 25 TABLET, EXTENDED RELEASE ORAL at 08:06

## 2020-01-01 RX ADMIN — PROPOFOL 40 MG: 10 INJECTION, EMULSION INTRAVENOUS at 14:48

## 2020-01-01 RX ADMIN — CEFEPIME HYDROCHLORIDE 1 G: 1 INJECTION, POWDER, FOR SOLUTION INTRAMUSCULAR; INTRAVENOUS at 11:00

## 2020-01-01 RX ADMIN — CLOTRIMAZOLE AND BETAMETHASONE DIPROPIONATE: 10; .5 CREAM TOPICAL at 20:49

## 2020-01-01 RX ADMIN — CEFEPIME 2 G: 2 INJECTION, POWDER, FOR SOLUTION INTRAVENOUS at 22:23

## 2020-01-01 RX ADMIN — Medication 50 MG: at 08:43

## 2020-01-01 RX ADMIN — LEVOTHYROXINE SODIUM 137 MCG: 0.14 TABLET ORAL at 06:51

## 2020-01-01 RX ADMIN — Medication 125 MG: at 18:04

## 2020-01-01 RX ADMIN — VANCOMYCIN HYDROCHLORIDE 750 MG: 5 INJECTION, POWDER, LYOPHILIZED, FOR SOLUTION INTRAVENOUS at 21:18

## 2020-01-01 RX ADMIN — DULOXETINE 40 MG: 20 CAPSULE, DELAYED RELEASE ORAL at 21:56

## 2020-01-01 RX ADMIN — Medication 250 MG: at 06:33

## 2020-01-01 RX ADMIN — Medication 125 MG: at 05:49

## 2020-01-01 RX ADMIN — PIPERACILLIN AND TAZOBACTAM 3.38 G: 3; .375 INJECTION, POWDER, LYOPHILIZED, FOR SOLUTION INTRAVENOUS at 23:28

## 2020-01-01 RX ADMIN — LINEZOLID 600 MG: 600 TABLET, FILM COATED ORAL at 21:19

## 2020-01-01 RX ADMIN — LOSARTAN POTASSIUM 50 MG: 50 TABLET, FILM COATED ORAL at 20:38

## 2020-01-01 RX ADMIN — SODIUM CHLORIDE 25 ML: 9 INJECTION, SOLUTION INTRAVENOUS at 20:36

## 2020-01-01 RX ADMIN — Medication 3000 UNITS: at 12:30

## 2020-01-01 RX ADMIN — ONDANSETRON 4 MG: 4 TABLET, ORALLY DISINTEGRATING ORAL at 21:19

## 2020-01-01 RX ADMIN — CEFEPIME HYDROCHLORIDE 1 G: 1 INJECTION, POWDER, FOR SOLUTION INTRAMUSCULAR; INTRAVENOUS at 22:23

## 2020-01-01 RX ADMIN — Medication 125 MG: at 11:27

## 2020-01-01 RX ADMIN — SODIUM CHLORIDE: 9 INJECTION, SOLUTION INTRAVENOUS at 16:45

## 2020-01-01 RX ADMIN — SODIUM CHLORIDE: 9 INJECTION, SOLUTION INTRAVENOUS at 10:16

## 2020-01-01 RX ADMIN — LEVOTHYROXINE SODIUM 137 MCG: 0.14 TABLET ORAL at 06:00

## 2020-01-01 RX ADMIN — DULOXETINE 40 MG: 20 CAPSULE, DELAYED RELEASE ORAL at 21:52

## 2020-01-01 RX ADMIN — AMLODIPINE BESYLATE 5 MG: 5 TABLET ORAL at 08:42

## 2020-01-01 RX ADMIN — AMLODIPINE BESYLATE 5 MG: 5 TABLET ORAL at 08:40

## 2020-01-01 RX ADMIN — MORPHINE SULFATE 2 MG: 2 INJECTION, SOLUTION INTRAMUSCULAR; INTRAVENOUS at 14:39

## 2020-01-01 RX ADMIN — WARFARIN SODIUM 7.5 MG: 7.5 TABLET ORAL at 17:28

## 2020-01-01 RX ADMIN — BUDESONIDE 500 MCG: 0.5 INHALANT RESPIRATORY (INHALATION) at 19:39

## 2020-01-01 RX ADMIN — Medication 1 CAPSULE: at 12:29

## 2020-01-01 RX ADMIN — MONTELUKAST 10 MG: 10 TABLET, FILM COATED ORAL at 21:21

## 2020-01-01 RX ADMIN — LINEZOLID 600 MG: 600 TABLET, FILM COATED ORAL at 22:08

## 2020-01-01 RX ADMIN — LINEZOLID 600 MG: 600 TABLET, FILM COATED ORAL at 21:56

## 2020-01-01 RX ADMIN — VANCOMYCIN HYDROCHLORIDE 1250 MG: 1 INJECTION, POWDER, LYOPHILIZED, FOR SOLUTION INTRAVENOUS at 20:52

## 2020-01-01 RX ADMIN — MONTELUKAST 10 MG: 10 TABLET, FILM COATED ORAL at 21:29

## 2020-01-01 RX ADMIN — MONTELUKAST 10 MG: 10 TABLET, FILM COATED ORAL at 20:50

## 2020-01-01 RX ADMIN — PROPOFOL 140 MCG/KG/MIN: 10 INJECTION, EMULSION INTRAVENOUS at 14:48

## 2020-01-01 RX ADMIN — CLOTRIMAZOLE AND BETAMETHASONE DIPROPIONATE: 10; .5 CREAM TOPICAL at 11:33

## 2020-01-01 RX ADMIN — METOPROLOL SUCCINATE 25 MG: 25 TABLET, EXTENDED RELEASE ORAL at 08:41

## 2020-01-01 RX ADMIN — LINEZOLID 600 MG: 600 TABLET, FILM COATED ORAL at 20:47

## 2020-01-01 RX ADMIN — SODIUM CHLORIDE: 9 INJECTION, SOLUTION INTRAVENOUS at 08:14

## 2020-01-01 RX ADMIN — PIPERACILLIN AND TAZOBACTAM 3.38 G: 3; .375 INJECTION, POWDER, LYOPHILIZED, FOR SOLUTION INTRAVENOUS at 22:32

## 2020-01-01 RX ADMIN — IPRATROPIUM BROMIDE AND ALBUTEROL SULFATE 3 ML: .5; 3 SOLUTION RESPIRATORY (INHALATION) at 14:22

## 2020-01-01 RX ADMIN — DULOXETINE 40 MG: 20 CAPSULE, DELAYED RELEASE ORAL at 21:02

## 2020-01-01 RX ADMIN — POTASSIUM CHLORIDE 40 MEQ: 20 TABLET, EXTENDED RELEASE ORAL at 16:14

## 2020-01-01 RX ADMIN — Medication 3000 UNITS: at 11:26

## 2020-01-01 RX ADMIN — AMLODIPINE BESYLATE 5 MG: 5 TABLET ORAL at 08:09

## 2020-01-01 RX ADMIN — LINEZOLID 600 MG: 600 TABLET, FILM COATED ORAL at 21:21

## 2020-01-01 RX ADMIN — Medication 1 CAPSULE: at 21:22

## 2020-01-01 RX ADMIN — PIPERACILLIN AND TAZOBACTAM 3.38 G: 3; .375 INJECTION, POWDER, LYOPHILIZED, FOR SOLUTION INTRAVENOUS at 20:25

## 2020-01-01 RX ADMIN — LEVOTHYROXINE SODIUM 137 MCG: 0.14 TABLET ORAL at 11:27

## 2020-01-01 RX ADMIN — IPRATROPIUM BROMIDE AND ALBUTEROL SULFATE 3 ML: .5; 3 SOLUTION RESPIRATORY (INHALATION) at 06:05

## 2020-01-01 RX ADMIN — Medication 125 MG: at 11:59

## 2020-01-01 RX ADMIN — HALOPERIDOL LACTATE 1 MG: 5 INJECTION, SOLUTION INTRAMUSCULAR at 21:30

## 2020-01-01 RX ADMIN — BUDESONIDE 500 MCG: 0.5 INHALANT RESPIRATORY (INHALATION) at 07:47

## 2020-01-01 RX ADMIN — AMLODIPINE BESYLATE 5 MG: 5 TABLET ORAL at 08:16

## 2020-01-01 RX ADMIN — HALOPERIDOL LACTATE 2 MG: 5 INJECTION, SOLUTION INTRAMUSCULAR at 20:14

## 2020-01-01 RX ADMIN — METOPROLOL SUCCINATE 25 MG: 25 TABLET, EXTENDED RELEASE ORAL at 08:27

## 2020-01-01 RX ADMIN — LEVOTHYROXINE SODIUM 137 MCG: 0.14 TABLET ORAL at 06:13

## 2020-01-01 RX ADMIN — Medication 125 MG: at 11:15

## 2020-01-01 RX ADMIN — MONTELUKAST 10 MG: 10 TABLET, FILM COATED ORAL at 20:38

## 2020-01-01 RX ADMIN — ENOXAPARIN SODIUM 30 MG: 30 INJECTION SUBCUTANEOUS at 09:12

## 2020-01-01 RX ADMIN — ARFORMOTEROL TARTRATE 15 MCG: 15 SOLUTION RESPIRATORY (INHALATION) at 16:50

## 2020-01-01 RX ADMIN — SODIUM CHLORIDE 25 ML: 9 INJECTION, SOLUTION INTRAVENOUS at 02:24

## 2020-01-01 RX ADMIN — SODIUM CHLORIDE 25 ML: 9 INJECTION, SOLUTION INTRAVENOUS at 14:23

## 2020-01-01 RX ADMIN — PROPOFOL 30 MG: 10 INJECTION, EMULSION INTRAVENOUS at 14:50

## 2020-01-01 RX ADMIN — Medication 1 TABLET: at 09:12

## 2020-01-01 RX ADMIN — REMDESIVIR 100 MG: 100 INJECTION, POWDER, LYOPHILIZED, FOR SOLUTION INTRAVENOUS at 15:27

## 2020-01-01 RX ADMIN — HALOPERIDOL LACTATE 1 MG: 5 INJECTION, SOLUTION INTRAMUSCULAR at 21:29

## 2020-01-01 RX ADMIN — Medication 250 MG: at 06:46

## 2020-01-01 RX ADMIN — DULOXETINE 40 MG: 20 CAPSULE, DELAYED RELEASE ORAL at 21:29

## 2020-01-01 RX ADMIN — Medication 125 MG: at 12:34

## 2020-01-01 RX ADMIN — FENTANYL CITRATE 12.5 MCG: 50 INJECTION INTRAMUSCULAR; INTRAVENOUS at 04:58

## 2020-01-01 RX ADMIN — Medication 1 CAPSULE: at 20:50

## 2020-01-01 RX ADMIN — Medication 1 CAPSULE: at 20:38

## 2020-01-01 RX ADMIN — LINEZOLID 600 MG: 600 TABLET, FILM COATED ORAL at 08:27

## 2020-01-01 RX ADMIN — LEVOTHYROXINE SODIUM 137 MCG: 0.14 TABLET ORAL at 10:08

## 2020-01-01 RX ADMIN — SODIUM CHLORIDE 25 ML: 9 INJECTION, SOLUTION INTRAVENOUS at 02:40

## 2020-01-01 RX ADMIN — Medication 125 MG: at 11:56

## 2020-01-01 RX ADMIN — SODIUM CHLORIDE 25 ML: 9 INJECTION, SOLUTION INTRAVENOUS at 03:23

## 2020-01-01 RX ADMIN — Medication 250 MG: at 19:11

## 2020-01-01 RX ADMIN — HALOPERIDOL LACTATE 2 MG: 5 INJECTION, SOLUTION INTRAMUSCULAR at 19:29

## 2020-01-01 RX ADMIN — Medication 1 CAPSULE: at 22:57

## 2020-01-01 RX ADMIN — ARFORMOTEROL TARTRATE 15 MCG: 15 SOLUTION RESPIRATORY (INHALATION) at 07:15

## 2020-01-01 RX ADMIN — LINEZOLID 600 MG: 600 TABLET, FILM COATED ORAL at 12:29

## 2020-01-01 RX ADMIN — SODIUM CHLORIDE 25 ML: 9 INJECTION, SOLUTION INTRAVENOUS at 04:30

## 2020-01-01 RX ADMIN — MORPHINE SULFATE 2 MG: 2 INJECTION, SOLUTION INTRAMUSCULAR; INTRAVENOUS at 10:14

## 2020-01-01 RX ADMIN — AMLODIPINE BESYLATE 5 MG: 5 TABLET ORAL at 09:17

## 2020-01-01 RX ADMIN — IPRATROPIUM BROMIDE AND ALBUTEROL SULFATE 3 ML: .5; 3 SOLUTION RESPIRATORY (INHALATION) at 10:32

## 2020-01-01 RX ADMIN — LINEZOLID 600 MG: 600 TABLET, FILM COATED ORAL at 09:11

## 2020-01-01 RX ADMIN — Medication 1 TABLET: at 11:13

## 2020-01-01 RX ADMIN — HALOPERIDOL LACTATE 2 MG: 5 INJECTION, SOLUTION INTRAMUSCULAR at 00:02

## 2020-01-01 RX ADMIN — WARFARIN SODIUM 7.5 MG: 7.5 TABLET ORAL at 19:00

## 2020-01-01 RX ADMIN — Medication 125 MG: at 13:16

## 2020-01-01 RX ADMIN — LEVOTHYROXINE SODIUM 137 MCG: 0.14 TABLET ORAL at 06:26

## 2020-01-01 RX ADMIN — DEXAMETHASONE SODIUM PHOSPHATE 4 MG: 4 INJECTION, SOLUTION INTRA-ARTICULAR; INTRALESIONAL; INTRAMUSCULAR; INTRAVENOUS; SOFT TISSUE at 02:49

## 2020-01-01 RX ADMIN — LEVOTHYROXINE SODIUM 137 MCG: 0.14 TABLET ORAL at 05:42

## 2020-01-01 RX ADMIN — GLIMEPIRIDE 2 MG: 2 TABLET ORAL at 10:09

## 2020-01-01 RX ADMIN — ARFORMOTEROL TARTRATE 15 MCG: 15 SOLUTION RESPIRATORY (INHALATION) at 22:46

## 2020-01-01 RX ADMIN — DULOXETINE 40 MG: 20 CAPSULE, DELAYED RELEASE ORAL at 22:07

## 2020-01-01 RX ADMIN — ARFORMOTEROL TARTRATE 15 MCG: 15 SOLUTION RESPIRATORY (INHALATION) at 06:05

## 2020-01-01 RX ADMIN — SODIUM CHLORIDE: 9 INJECTION, SOLUTION INTRAVENOUS at 01:49

## 2020-01-01 RX ADMIN — LINEZOLID 600 MG: 600 TABLET, FILM COATED ORAL at 20:38

## 2020-01-01 RX ADMIN — BUDESONIDE 500 MCG: 0.5 INHALANT RESPIRATORY (INHALATION) at 22:45

## 2020-01-01 RX ADMIN — ARFORMOTEROL TARTRATE 15 MCG: 15 SOLUTION RESPIRATORY (INHALATION) at 05:52

## 2020-01-01 RX ADMIN — LOSARTAN POTASSIUM 50 MG: 50 TABLET, FILM COATED ORAL at 22:32

## 2020-01-01 RX ADMIN — Medication 50 MG: at 17:28

## 2020-01-01 RX ADMIN — MONTELUKAST 10 MG: 10 TABLET, FILM COATED ORAL at 22:32

## 2020-01-01 RX ADMIN — REMDESIVIR 200 MG: 100 INJECTION, POWDER, LYOPHILIZED, FOR SOLUTION INTRAVENOUS at 17:27

## 2020-01-01 RX ADMIN — IPRATROPIUM BROMIDE AND ALBUTEROL SULFATE 1 AMPULE: .5; 3 SOLUTION RESPIRATORY (INHALATION) at 13:57

## 2020-01-01 RX ADMIN — CLOTRIMAZOLE AND BETAMETHASONE DIPROPIONATE: 10; .5 CREAM TOPICAL at 09:14

## 2020-01-01 RX ADMIN — DULOXETINE 40 MG: 20 CAPSULE, DELAYED RELEASE ORAL at 21:21

## 2020-01-01 RX ADMIN — BENZONATATE 100 MG: 100 CAPSULE ORAL at 11:26

## 2020-01-01 RX ADMIN — PIPERACILLIN AND TAZOBACTAM 3.38 G: 3; .375 INJECTION, POWDER, LYOPHILIZED, FOR SOLUTION INTRAVENOUS at 09:30

## 2020-01-01 RX ADMIN — Medication 250 MG: at 14:35

## 2020-01-01 RX ADMIN — LEVOTHYROXINE SODIUM 137 MCG: 0.14 TABLET ORAL at 06:11

## 2020-01-01 RX ADMIN — BUDESONIDE 500 MCG: 0.5 INHALANT RESPIRATORY (INHALATION) at 17:39

## 2020-01-01 RX ADMIN — POTASSIUM CHLORIDE 20 MEQ: 20 TABLET, EXTENDED RELEASE ORAL at 09:17

## 2020-01-01 RX ADMIN — GLIMEPIRIDE 2 MG: 4 TABLET ORAL at 06:47

## 2020-01-01 RX ADMIN — FENTANYL CITRATE 50 MCG: 50 INJECTION, SOLUTION INTRAMUSCULAR; INTRAVENOUS at 17:31

## 2020-01-01 RX ADMIN — SODIUM CHLORIDE 25 ML: 9 INJECTION, SOLUTION INTRAVENOUS at 18:46

## 2020-01-01 RX ADMIN — METOPROLOL SUCCINATE 25 MG: 25 TABLET, EXTENDED RELEASE ORAL at 08:16

## 2020-01-01 RX ADMIN — IPRATROPIUM BROMIDE AND ALBUTEROL SULFATE 3 ML: .5; 3 SOLUTION RESPIRATORY (INHALATION) at 14:01

## 2020-01-01 RX ADMIN — BUDESONIDE 500 MCG: 0.5 INHALANT RESPIRATORY (INHALATION) at 09:09

## 2020-01-01 RX ADMIN — CLOTRIMAZOLE AND BETAMETHASONE DIPROPIONATE: 10; .5 CREAM TOPICAL at 21:22

## 2020-01-01 RX ADMIN — MONTELUKAST 10 MG: 10 TABLET, FILM COATED ORAL at 21:52

## 2020-01-01 RX ADMIN — ROSUVASTATIN CALCIUM 20 MG: 10 TABLET, FILM COATED ORAL at 23:50

## 2020-01-01 RX ADMIN — LEVOTHYROXINE SODIUM 137 MCG: 0.14 TABLET ORAL at 05:49

## 2020-01-01 RX ADMIN — Medication 3000 UNITS: at 17:59

## 2020-01-01 RX ADMIN — Medication 1 CAPSULE: at 08:09

## 2020-01-01 RX ADMIN — IPRATROPIUM BROMIDE AND ALBUTEROL SULFATE 3 ML: .5; 3 SOLUTION RESPIRATORY (INHALATION) at 07:47

## 2020-01-01 RX ADMIN — DEXAMETHASONE SODIUM PHOSPHATE 6 MG: 10 INJECTION INTRAMUSCULAR; INTRAVENOUS at 17:28

## 2020-01-01 RX ADMIN — Medication 125 MG: at 11:32

## 2020-01-01 RX ADMIN — ACETAMINOPHEN 500 MG: 500 TABLET, FILM COATED ORAL at 10:47

## 2020-01-01 RX ADMIN — SODIUM CHLORIDE: 9 INJECTION, SOLUTION INTRAVENOUS at 22:11

## 2020-01-01 RX ADMIN — DEXAMETHASONE SODIUM PHOSPHATE 6 MG: 10 INJECTION INTRAMUSCULAR; INTRAVENOUS at 15:01

## 2020-01-01 RX ADMIN — PIPERACILLIN AND TAZOBACTAM 3.38 G: 3; .375 INJECTION, POWDER, LYOPHILIZED, FOR SOLUTION INTRAVENOUS at 00:30

## 2020-01-01 RX ADMIN — SODIUM CHLORIDE 30 ML: 9 INJECTION, SOLUTION INTRAVENOUS at 16:20

## 2020-01-01 RX ADMIN — PROPOFOL 20 MG: 10 INJECTION, EMULSION INTRAVENOUS at 14:53

## 2020-01-01 RX ADMIN — SODIUM CHLORIDE: 9 INJECTION, SOLUTION INTRAVENOUS at 18:00

## 2020-01-01 RX ADMIN — SODIUM CHLORIDE 25 ML: 9 INJECTION, SOLUTION INTRAVENOUS at 05:18

## 2020-01-01 RX ADMIN — IPRATROPIUM BROMIDE AND ALBUTEROL SULFATE 3 ML: .5; 3 SOLUTION RESPIRATORY (INHALATION) at 13:18

## 2020-01-01 RX ADMIN — CLOTRIMAZOLE AND BETAMETHASONE DIPROPIONATE: 10; .5 CREAM TOPICAL at 08:17

## 2020-01-01 RX ADMIN — Medication 50 MG: at 12:30

## 2020-01-01 RX ADMIN — ACETAMINOPHEN 500 MG: 500 TABLET, FILM COATED ORAL at 08:09

## 2020-01-01 RX ADMIN — CLOTRIMAZOLE AND BETAMETHASONE DIPROPIONATE: 10; .5 CREAM TOPICAL at 20:38

## 2020-01-01 RX ADMIN — FENTANYL CITRATE 50 MCG: 50 INJECTION, SOLUTION INTRAMUSCULAR; INTRAVENOUS at 22:52

## 2020-01-01 RX ADMIN — CLOTRIMAZOLE AND BETAMETHASONE DIPROPIONATE: 10; .5 CREAM TOPICAL at 08:57

## 2020-01-01 RX ADMIN — METOPROLOL SUCCINATE 50 MG: 25 TABLET, EXTENDED RELEASE ORAL at 10:08

## 2020-01-01 RX ADMIN — Medication 1 CAPSULE: at 08:27

## 2020-01-01 RX ADMIN — IPRATROPIUM BROMIDE AND ALBUTEROL SULFATE 3 ML: .5; 3 SOLUTION RESPIRATORY (INHALATION) at 22:45

## 2020-01-01 RX ADMIN — Medication 250 MG: at 18:45

## 2020-01-01 RX ADMIN — Medication 50 MG: at 11:13

## 2020-01-01 RX ADMIN — Medication 250 MG: at 01:14

## 2020-01-01 RX ADMIN — PIPERACILLIN AND TAZOBACTAM 3.38 G: 3; .375 INJECTION, POWDER, LYOPHILIZED, FOR SOLUTION INTRAVENOUS at 13:44

## 2020-01-01 RX ADMIN — Medication 1 TABLET: at 14:20

## 2020-01-01 RX ADMIN — BUDESONIDE 500 MCG: 0.5 INHALANT RESPIRATORY (INHALATION) at 05:52

## 2020-01-01 RX ADMIN — ARFORMOTEROL TARTRATE 15 MCG: 15 SOLUTION RESPIRATORY (INHALATION) at 09:09

## 2020-01-01 RX ADMIN — MONTELUKAST 10 MG: 10 TABLET, FILM COATED ORAL at 01:50

## 2020-01-01 RX ADMIN — FENTANYL CITRATE 12.5 MCG: 50 INJECTION INTRAMUSCULAR; INTRAVENOUS at 12:50

## 2020-01-01 RX ADMIN — CLOTRIMAZOLE AND BETAMETHASONE DIPROPIONATE: 10; .5 CREAM TOPICAL at 08:44

## 2020-01-01 RX ADMIN — LINEZOLID 600 MG: 600 TABLET, FILM COATED ORAL at 08:44

## 2020-01-01 RX ADMIN — ENOXAPARIN SODIUM 30 MG: 30 INJECTION SUBCUTANEOUS at 21:19

## 2020-01-01 RX ADMIN — Medication 250 MG: at 23:51

## 2020-01-01 RX ADMIN — MONTELUKAST 10 MG: 10 TABLET, FILM COATED ORAL at 21:18

## 2020-01-01 RX ADMIN — LOSARTAN POTASSIUM 50 MG: 50 TABLET, FILM COATED ORAL at 21:18

## 2020-01-01 RX ADMIN — FENTANYL CITRATE 12.5 MCG: 50 INJECTION INTRAMUSCULAR; INTRAVENOUS at 15:11

## 2020-01-01 RX ADMIN — CLOTRIMAZOLE AND BETAMETHASONE DIPROPIONATE: 10; .5 CREAM TOPICAL at 21:57

## 2020-01-01 RX ADMIN — ARFORMOTEROL TARTRATE 15 MCG: 15 SOLUTION RESPIRATORY (INHALATION) at 19:39

## 2020-01-01 RX ADMIN — LEVOTHYROXINE SODIUM 137 MCG: 0.14 TABLET ORAL at 06:36

## 2020-01-01 RX ADMIN — Medication 250 MG: at 11:44

## 2020-01-01 RX ADMIN — PREDNISONE 60 MG: 20 TABLET ORAL at 17:46

## 2020-01-01 RX ADMIN — FENTANYL CITRATE 12.5 MCG: 50 INJECTION, SOLUTION INTRAMUSCULAR; INTRAVENOUS at 13:26

## 2020-01-01 RX ADMIN — Medication 1 TABLET: at 08:41

## 2020-01-01 RX ADMIN — ENOXAPARIN SODIUM 30 MG: 30 INJECTION SUBCUTANEOUS at 21:22

## 2020-01-01 RX ADMIN — Medication 3000 UNITS: at 08:42

## 2020-01-01 RX ADMIN — Medication 3000 UNITS: at 08:46

## 2020-01-01 RX ADMIN — Medication 125 MG: at 00:46

## 2020-01-01 RX ADMIN — LEVOTHYROXINE SODIUM 137 MCG: 0.14 TABLET ORAL at 06:34

## 2020-01-01 RX ADMIN — GLIMEPIRIDE 2 MG: 4 TABLET ORAL at 06:33

## 2020-01-01 RX ADMIN — SODIUM CHLORIDE: 9 INJECTION, SOLUTION INTRAVENOUS at 14:21

## 2020-01-01 RX ADMIN — IPRATROPIUM BROMIDE AND ALBUTEROL SULFATE 3 ML: .5; 3 SOLUTION RESPIRATORY (INHALATION) at 10:54

## 2020-01-01 RX ADMIN — AMLODIPINE BESYLATE 5 MG: 5 TABLET ORAL at 11:13

## 2020-01-01 RX ADMIN — WARFARIN SODIUM 7.5 MG: 7.5 TABLET ORAL at 18:40

## 2020-01-01 RX ADMIN — IPRATROPIUM BROMIDE AND ALBUTEROL SULFATE 3 ML: .5; 3 SOLUTION RESPIRATORY (INHALATION) at 11:00

## 2020-01-01 RX ADMIN — Medication 1 CAPSULE: at 09:17

## 2020-01-01 RX ADMIN — PIPERACILLIN AND TAZOBACTAM 3.38 G: 3; .375 INJECTION, POWDER, LYOPHILIZED, FOR SOLUTION INTRAVENOUS at 10:41

## 2020-01-01 RX ADMIN — Medication 125 MG: at 00:00

## 2020-01-01 RX ADMIN — Medication 1 CAPSULE: at 21:18

## 2020-01-01 RX ADMIN — AMLODIPINE BESYLATE 5 MG: 5 TABLET ORAL at 08:27

## 2020-01-01 RX ADMIN — Medication 125 MG: at 06:57

## 2020-01-01 RX ADMIN — POTASSIUM BICARBONATE 40 MEQ: 782 TABLET, EFFERVESCENT ORAL at 14:28

## 2020-01-01 RX ADMIN — PROPOFOL 30 MG: 10 INJECTION, EMULSION INTRAVENOUS at 14:49

## 2020-01-01 RX ADMIN — MORPHINE SULFATE 2 MG: 2 INJECTION, SOLUTION INTRAMUSCULAR; INTRAVENOUS at 09:42

## 2020-01-01 RX ADMIN — LOSARTAN POTASSIUM 50 MG: 50 TABLET, FILM COATED ORAL at 21:56

## 2020-01-01 RX ADMIN — LINEZOLID 600 MG: 600 TABLET, FILM COATED ORAL at 08:08

## 2020-01-01 RX ADMIN — MONTELUKAST 10 MG: 10 TABLET, FILM COATED ORAL at 20:47

## 2020-01-01 RX ADMIN — Medication 125 MG: at 18:15

## 2020-01-01 RX ADMIN — DEXAMETHASONE SODIUM PHOSPHATE 4 MG: 4 INJECTION, SOLUTION INTRA-ARTICULAR; INTRALESIONAL; INTRAMUSCULAR; INTRAVENOUS; SOFT TISSUE at 15:43

## 2020-01-01 RX ADMIN — CLOTRIMAZOLE AND BETAMETHASONE DIPROPIONATE: 10; .5 CREAM TOPICAL at 11:14

## 2020-01-01 RX ADMIN — Medication 1 CAPSULE: at 21:29

## 2020-01-01 RX ADMIN — CLOTRIMAZOLE AND BETAMETHASONE DIPROPIONATE: 10; .5 CREAM TOPICAL at 20:47

## 2020-01-01 RX ADMIN — PIPERACILLIN AND TAZOBACTAM 3.38 G: 3; .375 INJECTION, POWDER, LYOPHILIZED, FOR SOLUTION INTRAVENOUS at 01:55

## 2020-01-01 RX ADMIN — ONDANSETRON 4 MG: 4 TABLET, ORALLY DISINTEGRATING ORAL at 19:43

## 2020-01-01 RX ADMIN — LOSARTAN POTASSIUM 50 MG: 50 TABLET, FILM COATED ORAL at 20:47

## 2020-01-01 RX ADMIN — MONTELUKAST 10 MG: 10 TABLET, FILM COATED ORAL at 20:57

## 2020-01-01 RX ADMIN — Medication 50 MG: at 11:30

## 2020-01-01 RX ADMIN — DULOXETINE 40 MG: 20 CAPSULE, DELAYED RELEASE ORAL at 21:18

## 2020-01-01 RX ADMIN — POTASSIUM CHLORIDE 20 MEQ: 20 TABLET, EXTENDED RELEASE ORAL at 13:29

## 2020-01-01 RX ADMIN — PROPOFOL 20 MG: 10 INJECTION, EMULSION INTRAVENOUS at 14:51

## 2020-01-01 RX ADMIN — SODIUM CHLORIDE 25 ML: 9 INJECTION, SOLUTION INTRAVENOUS at 14:14

## 2020-01-01 RX ADMIN — LINEZOLID 600 MG: 600 TABLET, FILM COATED ORAL at 21:50

## 2020-01-01 RX ADMIN — Medication 250 MG: at 00:35

## 2020-01-01 RX ADMIN — LOSARTAN POTASSIUM 50 MG: 50 TABLET, FILM COATED ORAL at 20:50

## 2020-01-01 RX ADMIN — CEFEPIME HYDROCHLORIDE 1 G: 1 INJECTION, POWDER, FOR SOLUTION INTRAMUSCULAR; INTRAVENOUS at 22:12

## 2020-01-01 RX ADMIN — ARFORMOTEROL TARTRATE 15 MCG: 15 SOLUTION RESPIRATORY (INHALATION) at 07:46

## 2020-01-01 RX ADMIN — DEXAMETHASONE SODIUM PHOSPHATE 6 MG: 10 INJECTION INTRAMUSCULAR; INTRAVENOUS at 15:24

## 2020-01-01 RX ADMIN — LORAZEPAM 0.5 MG: 2 INJECTION INTRAMUSCULAR; INTRAVENOUS at 20:18

## 2020-01-01 RX ADMIN — IPRATROPIUM BROMIDE AND ALBUTEROL SULFATE 3 ML: .5; 3 SOLUTION RESPIRATORY (INHALATION) at 19:39

## 2020-01-01 RX ADMIN — LOSARTAN POTASSIUM 50 MG: 50 TABLET, FILM COATED ORAL at 21:02

## 2020-01-01 RX ADMIN — METOPROLOL SUCCINATE 25 MG: 25 TABLET, EXTENDED RELEASE ORAL at 13:57

## 2020-01-01 RX ADMIN — LINEZOLID 600 MG: 600 TABLET, FILM COATED ORAL at 09:17

## 2020-01-01 RX ADMIN — VANCOMYCIN HYDROCHLORIDE 125 MG: 10 INJECTION, POWDER, LYOPHILIZED, FOR SOLUTION INTRAVENOUS at 07:00

## 2020-01-01 RX ADMIN — BUDESONIDE 500 MCG: 0.5 INHALANT RESPIRATORY (INHALATION) at 07:15

## 2020-01-01 RX ADMIN — METOPROLOL SUCCINATE 25 MG: 25 TABLET, EXTENDED RELEASE ORAL at 08:09

## 2020-01-01 RX ADMIN — GLIMEPIRIDE 2 MG: 4 TABLET ORAL at 06:16

## 2020-01-01 RX ADMIN — DEXAMETHASONE SODIUM PHOSPHATE 6 MG: 10 INJECTION INTRAMUSCULAR; INTRAVENOUS at 15:29

## 2020-01-01 RX ADMIN — LORAZEPAM 0.5 MG: 2 INJECTION INTRAMUSCULAR; INTRAVENOUS at 05:03

## 2020-01-01 RX ADMIN — Medication 250 MG: at 12:00

## 2020-01-01 RX ADMIN — PIPERACILLIN AND TAZOBACTAM 3.38 G: 3; .375 INJECTION, POWDER, LYOPHILIZED, FOR SOLUTION INTRAVENOUS at 16:35

## 2020-01-01 RX ADMIN — AMLODIPINE BESYLATE 5 MG: 5 TABLET ORAL at 09:11

## 2020-01-01 RX ADMIN — LOSARTAN POTASSIUM 50 MG: 50 TABLET, FILM COATED ORAL at 01:49

## 2020-01-01 RX ADMIN — ACETAMINOPHEN 500 MG: 500 TABLET, FILM COATED ORAL at 20:56

## 2020-01-01 RX ADMIN — Medication 3000 UNITS: at 11:13

## 2020-01-01 RX ADMIN — PIPERACILLIN AND TAZOBACTAM 3.38 G: 3; .375 INJECTION, POWDER, LYOPHILIZED, FOR SOLUTION INTRAVENOUS at 06:13

## 2020-01-01 RX ADMIN — Medication 3000 UNITS: at 09:11

## 2020-01-01 RX ADMIN — Medication 250 MG: at 18:21

## 2020-01-01 RX ADMIN — IPRATROPIUM BROMIDE AND ALBUTEROL SULFATE 3 ML: .5; 3 SOLUTION RESPIRATORY (INHALATION) at 05:52

## 2020-01-01 RX ADMIN — Medication 50 MG: at 08:41

## 2020-01-01 RX ADMIN — Medication 125 MG: at 05:59

## 2020-01-01 RX ADMIN — GLIMEPIRIDE 2 MG: 2 TABLET ORAL at 06:36

## 2020-01-01 RX ADMIN — Medication 125 MG: at 11:54

## 2020-01-01 RX ADMIN — SODIUM CHLORIDE 1000 ML: 9 INJECTION, SOLUTION INTRAVENOUS at 19:04

## 2020-01-01 RX ADMIN — Medication 1 CAPSULE: at 20:47

## 2020-01-01 RX ADMIN — AMLODIPINE BESYLATE 5 MG: 5 TABLET ORAL at 10:08

## 2020-01-01 RX ADMIN — Medication 1 CAPSULE: at 14:13

## 2020-01-01 RX ADMIN — DULOXETINE 40 MG: 20 CAPSULE, DELAYED RELEASE ORAL at 20:57

## 2020-01-01 RX ADMIN — Medication 250 MG: at 17:26

## 2020-01-01 RX ADMIN — Medication 1 TABLET: at 09:11

## 2020-01-01 RX ADMIN — AMLODIPINE BESYLATE 5 MG: 5 TABLET ORAL at 02:59

## 2020-01-01 RX ADMIN — DEXTROSE MONOHYDRATE 12.5 G: 25 INJECTION, SOLUTION INTRAVENOUS at 07:25

## 2020-01-01 RX ADMIN — POTASSIUM CHLORIDE 20 MEQ: 20 TABLET, EXTENDED RELEASE ORAL at 08:16

## 2020-01-01 RX ADMIN — PHYTONADIONE 10 MG: 5 TABLET ORAL at 17:25

## 2020-01-01 RX ADMIN — Medication 1 TABLET: at 12:29

## 2020-01-01 RX ADMIN — REMDESIVIR 100 MG: 100 INJECTION, POWDER, LYOPHILIZED, FOR SOLUTION INTRAVENOUS at 15:25

## 2020-01-01 RX ADMIN — BUDESONIDE 500 MCG: 0.5 INHALANT RESPIRATORY (INHALATION) at 16:50

## 2020-01-01 RX ADMIN — PIPERACILLIN AND TAZOBACTAM 3.38 G: 3; .375 INJECTION, POWDER, LYOPHILIZED, FOR SOLUTION INTRAVENOUS at 16:14

## 2020-01-01 RX ADMIN — CLOTRIMAZOLE AND BETAMETHASONE DIPROPIONATE: 10; .5 CREAM TOPICAL at 08:40

## 2020-01-01 RX ADMIN — BUDESONIDE 500 MCG: 0.5 INHALANT RESPIRATORY (INHALATION) at 06:05

## 2020-01-01 RX ADMIN — CLOTRIMAZOLE AND BETAMETHASONE DIPROPIONATE: 10; .5 CREAM TOPICAL at 21:20

## 2020-01-01 RX ADMIN — AMLODIPINE BESYLATE 5 MG: 5 TABLET ORAL at 11:26

## 2020-01-01 RX ADMIN — FENTANYL CITRATE 12.5 MCG: 50 INJECTION, SOLUTION INTRAMUSCULAR; INTRAVENOUS at 23:47

## 2020-01-01 RX ADMIN — CEFEPIME HYDROCHLORIDE 1 G: 1 INJECTION, POWDER, FOR SOLUTION INTRAMUSCULAR; INTRAVENOUS at 09:16

## 2020-01-01 RX ADMIN — LINEZOLID 600 MG: 600 TABLET, FILM COATED ORAL at 20:57

## 2020-01-01 RX ADMIN — DULOXETINE 40 MG: 20 CAPSULE, DELAYED RELEASE ORAL at 20:50

## 2020-01-01 RX ADMIN — SODIUM CHLORIDE 1000 ML: 9 INJECTION, SOLUTION INTRAVENOUS at 17:54

## 2020-01-01 RX ADMIN — SODIUM CHLORIDE 25 ML: 9 INJECTION, SOLUTION INTRAVENOUS at 13:30

## 2020-01-01 RX ADMIN — LORAZEPAM 0.5 MG: 2 INJECTION INTRAMUSCULAR; INTRAVENOUS at 11:55

## 2020-01-01 RX ADMIN — Medication 125 MG: at 18:59

## 2020-01-01 RX ADMIN — CEFEPIME HYDROCHLORIDE 1 G: 1 INJECTION, POWDER, FOR SOLUTION INTRAMUSCULAR; INTRAVENOUS at 10:16

## 2020-01-01 RX ADMIN — Medication 125 MG: at 06:26

## 2020-01-01 RX ADMIN — Medication 1 CAPSULE: at 11:26

## 2020-01-01 RX ADMIN — Medication 1 CAPSULE: at 10:13

## 2020-01-01 RX ADMIN — PHYTONADIONE 5 MG: 10 INJECTION, EMULSION INTRAMUSCULAR; INTRAVENOUS; SUBCUTANEOUS at 01:49

## 2020-01-01 RX ADMIN — Medication 125 MG: at 23:53

## 2020-01-01 RX ADMIN — Medication 1 TABLET: at 14:31

## 2020-01-01 RX ADMIN — LOSARTAN POTASSIUM 50 MG: 50 TABLET, FILM COATED ORAL at 22:07

## 2020-01-01 RX ADMIN — POTASSIUM CHLORIDE 20 MEQ: 20 TABLET, EXTENDED RELEASE ORAL at 08:09

## 2020-01-01 RX ADMIN — Medication 125 MG: at 06:16

## 2020-01-01 RX ADMIN — ONDANSETRON 4 MG: 2 INJECTION INTRAMUSCULAR; INTRAVENOUS at 16:08

## 2020-01-01 RX ADMIN — IPRATROPIUM BROMIDE AND ALBUTEROL SULFATE 3 ML: .5; 3 SOLUTION RESPIRATORY (INHALATION) at 10:11

## 2020-01-01 RX ADMIN — DEXAMETHASONE SODIUM PHOSPHATE 4 MG: 4 INJECTION, SOLUTION INTRA-ARTICULAR; INTRALESIONAL; INTRAMUSCULAR; INTRAVENOUS; SOFT TISSUE at 02:15

## 2020-01-01 RX ADMIN — FENTANYL CITRATE 12.5 MCG: 50 INJECTION INTRAMUSCULAR; INTRAVENOUS at 01:18

## 2020-01-01 RX ADMIN — CLOTRIMAZOLE AND BETAMETHASONE DIPROPIONATE: 10; .5 CREAM TOPICAL at 22:14

## 2020-01-01 RX ADMIN — ONDANSETRON 4 MG: 4 TABLET, ORALLY DISINTEGRATING ORAL at 23:23

## 2020-01-01 RX ADMIN — LEVOTHYROXINE SODIUM 137 MCG: 0.14 TABLET ORAL at 12:31

## 2020-01-01 RX ADMIN — WARFARIN SODIUM 7.5 MG: 7.5 TABLET ORAL at 17:24

## 2020-01-01 RX ADMIN — LINEZOLID 600 MG: 600 TABLET, FILM COATED ORAL at 11:30

## 2020-01-01 RX ADMIN — CLOTRIMAZOLE AND BETAMETHASONE DIPROPIONATE: 10; .5 CREAM TOPICAL at 10:17

## 2020-01-01 RX ADMIN — CLOTRIMAZOLE AND BETAMETHASONE DIPROPIONATE: 10; .5 CREAM TOPICAL at 08:27

## 2020-01-01 RX ADMIN — Medication 125 MG: at 06:13

## 2020-01-01 RX ADMIN — REMDESIVIR 100 MG: 100 INJECTION, POWDER, LYOPHILIZED, FOR SOLUTION INTRAVENOUS at 15:01

## 2020-01-01 RX ADMIN — LINEZOLID 600 MG: 600 TABLET, FILM COATED ORAL at 11:13

## 2020-01-01 RX ADMIN — METOPROLOL SUCCINATE 25 MG: 25 TABLET, EXTENDED RELEASE ORAL at 11:26

## 2020-01-01 RX ADMIN — MONTELUKAST 10 MG: 10 TABLET, FILM COATED ORAL at 21:56

## 2020-01-01 RX ADMIN — ROSUVASTATIN CALCIUM 20 MG: 10 TABLET, FILM COATED ORAL at 21:02

## 2020-01-01 RX ADMIN — SODIUM CHLORIDE 25 ML: 9 INJECTION, SOLUTION INTRAVENOUS at 01:30

## 2020-01-01 RX ADMIN — GLIMEPIRIDE 2 MG: 4 TABLET ORAL at 08:57

## 2020-01-01 RX ADMIN — SODIUM CHLORIDE: 9 INJECTION, SOLUTION INTRAVENOUS at 14:20

## 2020-01-01 RX ADMIN — Medication 1 TABLET: at 08:17

## 2020-01-01 RX ADMIN — WARFARIN SODIUM 7.5 MG: 7.5 TABLET ORAL at 16:53

## 2020-01-01 RX ADMIN — FENTANYL CITRATE 12.5 MCG: 50 INJECTION INTRAMUSCULAR; INTRAVENOUS at 21:11

## 2020-01-01 RX ADMIN — ACETAMINOPHEN 500 MG: 500 TABLET, FILM COATED ORAL at 00:36

## 2020-01-01 RX ADMIN — DULOXETINE 40 MG: 20 CAPSULE, DELAYED RELEASE ORAL at 20:47

## 2020-01-01 RX ADMIN — AMLODIPINE BESYLATE 5 MG: 5 TABLET ORAL at 08:07

## 2020-01-01 RX ADMIN — MORPHINE SULFATE 4 MG: 4 INJECTION, SOLUTION INTRAMUSCULAR; INTRAVENOUS at 21:30

## 2020-01-01 RX ADMIN — LINEZOLID 600 MG: 600 TABLET, FILM COATED ORAL at 20:50

## 2020-01-01 RX ADMIN — IPRATROPIUM BROMIDE AND ALBUTEROL SULFATE 3 ML: .5; 3 SOLUTION RESPIRATORY (INHALATION) at 16:50

## 2020-01-01 RX ADMIN — MORPHINE SULFATE 2 MG: 2 INJECTION, SOLUTION INTRAMUSCULAR; INTRAVENOUS at 02:00

## 2020-01-01 ASSESSMENT — ENCOUNTER SYMPTOMS
SHORTNESS OF BREATH: 1
COUGH: 0
VOMITING: 0
BACK PAIN: 1
DIARRHEA: 1
SORE THROAT: 0
COUGH: 1
ABDOMINAL PAIN: 0
CHEST TIGHTNESS: 0
BLOOD IN STOOL: 0
DIARRHEA: 0
BACK PAIN: 1
ABDOMINAL PAIN: 0
NAUSEA: 0
ABDOMINAL PAIN: 0
SHORTNESS OF BREATH: 1
SHORTNESS OF BREATH: 1
COLOR CHANGE: 0
NAUSEA: 0
VOMITING: 0
SHORTNESS OF BREATH: 0
TACHYPNEA: 1
CHEST TIGHTNESS: 0
RHINORRHEA: 0
BACK PAIN: 0

## 2020-01-01 ASSESSMENT — PAIN SCALES - GENERAL
PAINLEVEL_OUTOF10: 0
PAINLEVEL_OUTOF10: 7
PAINLEVEL_OUTOF10: 0
PAINLEVEL_OUTOF10: 0
PAINLEVEL_OUTOF10: 7
PAINLEVEL_OUTOF10: 0
PAINLEVEL_OUTOF10: 0
PAINLEVEL_OUTOF10: 2
PAINLEVEL_OUTOF10: 4
PAINLEVEL_OUTOF10: 5
PAINLEVEL_OUTOF10: 0
PAINLEVEL_OUTOF10: 0
PAINLEVEL_OUTOF10: 5
PAINLEVEL_OUTOF10: 0
PAINLEVEL_OUTOF10: 8
PAINLEVEL_OUTOF10: 0
PAINLEVEL_OUTOF10: 4
PAINLEVEL_OUTOF10: 0
PAINLEVEL_OUTOF10: 2
PAINLEVEL_OUTOF10: 5
PAINLEVEL_OUTOF10: 0
PAINLEVEL_OUTOF10: 2
PAINLEVEL_OUTOF10: 5
PAINLEVEL_OUTOF10: 0
PAINLEVEL_OUTOF10: 4
PAINLEVEL_OUTOF10: 0
PAINLEVEL_OUTOF10: 0
PAINLEVEL_OUTOF10: 10
PAINLEVEL_OUTOF10: 0
PAINLEVEL_OUTOF10: 2
PAINLEVEL_OUTOF10: 7
PAINLEVEL_OUTOF10: 5
PAINLEVEL_OUTOF10: 0
PAINLEVEL_OUTOF10: 2
PAINLEVEL_OUTOF10: 0
PAINLEVEL_OUTOF10: 0
PAINLEVEL_OUTOF10: 7
PAINLEVEL_OUTOF10: 2
PAINLEVEL_OUTOF10: 8
PAINLEVEL_OUTOF10: 7
PAINLEVEL_OUTOF10: 0
PAINLEVEL_OUTOF10: 5
PAINLEVEL_OUTOF10: 0
PAINLEVEL_OUTOF10: 2
PAINLEVEL_OUTOF10: 0
PAINLEVEL_OUTOF10: 7
PAINLEVEL_OUTOF10: 5
PAINLEVEL_OUTOF10: 0

## 2020-01-01 ASSESSMENT — PAIN DESCRIPTION - DESCRIPTORS
DESCRIPTORS: ACHING;CONSTANT;STABBING
DESCRIPTORS: ACHING;DISCOMFORT;OTHER (COMMENT)

## 2020-01-01 ASSESSMENT — PAIN SCALES - WONG BAKER
WONGBAKER_NUMERICALRESPONSE: 0

## 2020-01-01 ASSESSMENT — PAIN DESCRIPTION - FREQUENCY: FREQUENCY: CONTINUOUS

## 2020-01-01 ASSESSMENT — PAIN DESCRIPTION - PAIN TYPE
TYPE: CHRONIC PAIN
TYPE: ACUTE PAIN;CHRONIC PAIN
TYPE: ACUTE PAIN
TYPE: CHRONIC PAIN

## 2020-01-01 ASSESSMENT — PAIN DESCRIPTION - PROGRESSION: CLINICAL_PROGRESSION: GRADUALLY WORSENING

## 2020-01-01 ASSESSMENT — PAIN DESCRIPTION - LOCATION
LOCATION: BACK
LOCATION: BACK
LOCATION: ABDOMEN
LOCATION: BACK

## 2020-01-01 ASSESSMENT — PAIN - FUNCTIONAL ASSESSMENT: PAIN_FUNCTIONAL_ASSESSMENT: PREVENTS OR INTERFERES SOME ACTIVE ACTIVITIES AND ADLS

## 2020-01-01 ASSESSMENT — PAIN DESCRIPTION - ORIENTATION
ORIENTATION: MID
ORIENTATION: LOWER;MID

## 2020-05-25 NOTE — ED NOTES
Patient now alert to self only, stating it is 2002, February, and that she is in a rehab center, dr jeronimo notified. Per report from previous nurse patient was alert and oriented for her. Patient taking off monitor stickers, pulse ox, and oxygen. NC reapplied.      Annmarie Barber RN  05/25/20 7737 Please fax over the office notes from date of service 4-17-19 and the work status to [phone]. She received a fax response yesterday but these items were not attached.

## 2020-05-25 NOTE — ED NOTES
Patient trying to take off monitors and oxygen, spoke with leonardo daughter in lobby she says patient does have onset of dementia/confusion at times and worsens with hospital stays, dr jeronimo notified     Savita Gamboa RN  05/25/20 [id number]

## 2020-05-27 NOTE — CARE COORDINATION
COVID-19 ED/Flu Clinic Initial Outreach Note    Patient contacted regarding recent visit for viral symptoms. This Shreyas Cost attempted to contact  the patient for second outreach by telephone to perform post discharge call. Phone rang busy, not able to leave a message. Will no longer make and outreach.

## 2020-10-06 NOTE — ED NOTES
Family in car Carol Montemayor number in Emergency Contacts     Isabell Mas  10/06/20 [address]  10/06/20 1234

## 2020-10-06 NOTE — ED PROVIDER NOTES
Patient presents to the ED for evaluation of a fall that occurred this afternoon. Patient states that she was putting on her nightgown to get ready to lay down for bed and rest.  While she was putting the gown overhead it made her dizzy. She stood up and tried to pull the gone over more and she got dizzy even more and then fell. She states that she fell right onto her backside. She denies hitting her head or loss of consciousness. She is complaining mostly of mid back pain. Rates the discomfort a 9 out of 10 but is comfortable at rest.  She was able to ambulate after the episode. She states that ever since she had a stroke she has had intermittent episodes of dizziness like this before. There was no associated nausea or vomiting. There was no associated chest pain. She states she occasionally gets short of breath but that is unusual for her. She wears 2 L by nasal cannula of supplemental oxygen at home continuously. She denies any recent fever or chills. States that the back pain has been sudden onset after the fall and has been persistent. The pain is worse with movement and alleviated by rest.  Nuys any numbness or tingling in her lower extremities. She has not had anything recently for pain control and does not request anything at this time. Review of Systems   Constitutional: Negative for chills, diaphoresis, fatigue and fever. HENT: Negative for congestion and rhinorrhea. Eyes: Negative for visual disturbance. Respiratory: Positive for shortness of breath. Negative for cough. Cardiovascular: Negative for chest pain, palpitations and leg swelling. Gastrointestinal: Negative for abdominal pain, blood in stool, diarrhea, nausea and vomiting. Genitourinary: Negative for difficulty urinating, dysuria and hematuria. Musculoskeletal: Positive for back pain. Negative for neck pain. Skin: Negative for pallor and wound. Neurological: Positive for dizziness.  Negative for syncope, weakness, light-headedness, numbness and headaches. Hematological: Bruises/bleeds easily (on coumadin). Psychiatric/Behavioral: Negative for confusion. Physical Exam  Vitals signs and nursing note reviewed. Constitutional:       General: She is not in acute distress. Appearance: She is well-developed and normal weight. She is not diaphoretic. Comments: Patient is sitting in the bed in no distress. HENT:      Head: Normocephalic and atraumatic. Ears:      Comments: No hemotympanum     Mouth/Throat:      Mouth: Mucous membranes are moist.   Eyes:      Extraocular Movements: Extraocular movements intact. Conjunctiva/sclera: Conjunctivae normal.      Pupils: Pupils are equal, round, and reactive to light. Neck:      Musculoskeletal: Normal range of motion and neck supple. No muscular tenderness ( No midline tenderness of the cervical spine. No overlying bony crepitance or ecchymosis. ). Cardiovascular:      Rate and Rhythm: Normal rate and regular rhythm. Heart sounds: Normal heart sounds. No murmur. Pulmonary:      Effort: Pulmonary effort is normal. No respiratory distress ( No conversational dyspnea, accessory muscle use, or tachypnea. ). Breath sounds: Normal breath sounds. No wheezing or rales. Abdominal:      General: Bowel sounds are normal. There is no distension. Palpations: Abdomen is soft. Tenderness: There is no abdominal tenderness. There is no guarding or rebound. Comments: Pelvis is stable and nontender   Musculoskeletal:         General: Tenderness ( Patient has midline tenderness of the mid thoracic and lumbar spine. No bony crepitance. No overlying erythema or ecchymosis noted.) present. Skin:     General: Skin is warm and dry. Neurological:      Mental Status: She is alert and oriented to person, place, and time. Comments: Sensation grossly intact. No focal neurological deficits. No ataxia with finger-to-nose. they called EMS initially was due to the nausea. Patient was nauseated upon arrival.    [MS]   1625 Resting in bed no distress. States he feels much better after receiving Zofran. She was ambulated and did so without difficulty. She did not complain of increasing back pain. Discussed that the compression fractures were of an indeterminate age except for the T12 compression fracture. She has not required anything for pain control while in the ED. She states that she wants to go home and will follow up with her PCP. I also will give her someone to follow-up regarding the compression fractures. I will send her home with something for nausea. She states that she will take Tylenol at home for pain control. She understands if she has worsening symptoms or new concerns that she can return to the ED for further evaluation. [MS]      ED Course User Index  [MS] Jami Schwartz, DO       --------------------------------------------- PAST HISTORY ---------------------------------------------  Past Medical History:  has a past medical history of Arthritis, Chronic back pain, COPD (chronic obstructive pulmonary disease) ([facility] [address] Diabetes mellitus ([facility] [address] GERD (gastroesophageal reflux disease), H/O cardiovascular stress test, Hyperlipidemia, and Thyroid disease. Past Surgical History:  has a past surgical history that includes Thyroidectomy; Hysterectomy; Appendectomy; Cholecystectomy; Breast surgery; joint replacement; Colonoscopy; Endoscopy, colon, diagnostic; and Knee Arthroplasty (Right, 1/15/2014). Social History:  reports that she has been smoking. She has a 60.00 pack-year smoking history. She has never used smokeless tobacco. She reports that she does not drink alcohol or use drugs. Family History: family history is not on file. The patients home medications have been reviewed. Allergies: Latex;  Codeine; and Morphine    -------------------------------------------------- RESULTS -------------------------------------------------  Labs:  Results for orders placed or performed during the hospital encounter of 10/06/20   CBC Auto Differential   Result Value Ref Range    WBC 12.5 (H) 4.5 - 11.5 E9/L    RBC 4.75 3.50 - 5.50 E12/L    Hemoglobin 13.6 11.5 - 15.5 g/dL    Hematocrit 42.2 34.0 - 48.0 %    MCV 88.8 80.0 - 99.9 fL    MCH 28.6 26.0 - 35.0 pg    MCHC 32.2 32.0 - 34.5 %    RDW 12.8 11.5 - 15.0 fL    Platelets 529 638 - 756 E9/L    MPV 10.3 7.0 - 12.0 fL    Neutrophils % 80.3 (H) 43.0 - 80.0 %    Immature Granulocytes % 0.6 0.0 - 5.0 %    Lymphocytes % 12.5 (L) 20.0 - 42.0 %    Monocytes % 5.6 2.0 - 12.0 %    Eosinophils % 0.6 0.0 - 6.0 %    Basophils % 0.4 0.0 - 2.0 %    Neutrophils Absolute 10.06 (H) 1.80 - 7.30 E9/L    Immature Granulocytes # 0.08 E9/L    Lymphocytes Absolute 1.56 1.50 - 4.00 E9/L    Monocytes Absolute 0.70 0.10 - 0.95 E9/L    Eosinophils Absolute 0.08 0.05 - 0.50 E9/L    Basophils Absolute 0.05 0.00 - 0.20 N2/B   Basic Metabolic Panel w/ Reflex to MG   Result Value Ref Range    Sodium 132 132 - 146 mmol/L    Potassium reflex Magnesium 4.4 3.5 - 5.0 mmol/L    Chloride 98 98 - 107 mmol/L    CO2 22 22 - 29 mmol/L    Anion Gap 12 7 - 16 mmol/L    Glucose 174 (H) 74 - 99 mg/dL    BUN 30 (H) 8 - 23 mg/dL    CREATININE 1.1 (H) 0.5 - 1.0 mg/dL    GFR Non-African American 48 >=60 mL/min/1.73    GFR African American 58     Calcium 9.4 8.6 - 10.2 mg/dL   Troponin   Result Value Ref Range    Troponin <0.01 0.00 - 0.03 ng/mL   Protime-INR   Result Value Ref Range    Protime 24.1 (H) 9.3 - 12.4 sec    INR 2.1    EKG 12 Lead   Result Value Ref Range    Ventricular Rate 77 BPM    Atrial Rate 77 BPM    P-R Interval 130 ms    QRS Duration 86 ms    Q-T Interval 400 ms    QTc Calculation (Bazett) 452 ms    P Axis 49 degrees    R Axis 68 degrees    T Axis 72 degrees       Radiology:  CT HEAD WO CONTRAST   Final Result   No acute intracranial abnormality.          CT CERVICAL SPINE WO CONTRAST   Final Result   No acute osseous abnormality of the cervical spine. XR CHEST (2 VW)   Final Result   No acute process. XR THORACIC SPINE (3 VIEWS)   Final Result   Diffuse osteopenia, limiting evaluation. Age-indeterminate moderate   compression deformities of the T4, T5, and T12 vertebral body superior   endplates. XR LUMBAR SPINE (2-3 VIEWS)   Final Result   1. New anterior wedge compression fracture involving T12 of approximately   50%. MRI may be helpful for further evaluation. 2.  New mild depression is seen involving superior endplate of L3 vertebral   body. ------------------------- NURSING NOTES AND VITALS REVIEWED ---------------------------  Date / Time Roomed:  10/6/2020 12:34 PM  ED Bed Assignment:  04/04    The nursing notes within the ED encounter and vital signs as below have been reviewed. BP (!) 176/84   Pulse 88   Temp 97.7 °F (36.5 °C) (Oral)   Resp 20   Ht 5' 5\" (1.651 m)   Wt 115 lb (52.2 kg)   SpO2 94%   BMI 19.14 kg/m²   Oxygen Saturation Interpretation: Normal      ------------------------------------------ PROGRESS NOTES ------------------------------------------  I have spoken with the patient and daughter and discussed todays results, in addition to providing specific details for the plan of care and counseling regarding the diagnosis and prognosis. Their questions are answered at this time and they are agreeable with the plan. I discussed at length with them reasons for immediate return here for re evaluation. They will followup with primary care by calling their office tomorrow. --------------------------------- ADDITIONAL PROVIDER NOTES ---------------------------------  At this time the patient is without objective evidence of an acute process requiring hospitalization or inpatient management.   They have remained hemodynamically stable throughout their entire ED visit and are stable for discharge with outpatient follow-up. The plan has been discussed in detail and they are aware of the specific conditions for emergent return, as well as the importance of follow-up. New Prescriptions    ONDANSETRON (ZOFRAN ODT) 4 MG DISINTEGRATING TABLET    Take 1 tablet by mouth every 8 hours as needed for Nausea or Vomiting       Diagnosis:  1. Compression fracture of T12 vertebra, initial encounter ([facility] [address]    2. Dizziness    3. Non-intractable vomiting with nausea, unspecified vomiting type        Disposition:  Patient's disposition: Discharge to home  Patient's condition is stable.          Jonathan Benson DO  10/06/20 9107

## 2020-10-06 NOTE — ED NOTES
Dr. Lulu Montesinos at bedside, patient O2 reduced to 2L, home O2     Kathy Graham RN  10/06/20 3515

## 2020-10-25 NOTE — ED NOTES
Pt. States they had a fall two weeks ago, c/o back pain.      Matthew Munoz, RN  10/25/20 [facility] Ijeoma Marcum RN  10/25/20 4877

## 2020-10-25 NOTE — ED PROVIDER NOTES
Cardiovascular:      Rate and Rhythm: Normal rate and regular rhythm. Pulmonary:      Effort: Pulmonary effort is normal. No respiratory distress. Breath sounds: Normal breath sounds. Abdominal:      General: There is no distension. Palpations: Abdomen is soft. Tenderness: There is no abdominal tenderness. There is no guarding or rebound. Musculoskeletal: Normal range of motion. General: Tenderness present. Back:    Skin:     General: Skin is warm and dry. Findings: No erythema. Neurological:      Mental Status: She is alert and oriented to person, place, and time. Cranial Nerves: No cranial nerve deficit. Coordination: Coordination normal.          Procedures     MDM     ED Course as of Oct 27 1827   Gonzalez Carpenterh Oct 25, 2020   2105 Patient requesting low-dose pain medication. Will be ordered for her.    [SO]   2115 No beds available at [facility], neurosurgery does not round at [facility]. [SO]   2123 Spoke with patient's Family doctor, Dr. Ayo Horner, he does request that the patient be sent to [facility] as there are no beds at [facility] where neurosurgery can evaluate her.    [SO]   2123 [facility] as she has been there before.    [SO]   [phone] with Dr. Leena Ferraro, Hospitalist at [facility] accepting the patient.    [SO]      ED Course User Index  [SO] Mireillejose Whipple, DO        ED Course as of Oct 27 1827   Sun Oct 25, 2020   2105 Patient requesting low-dose pain medication. Will be ordered for her.    [SO]   2115 No beds available at [facility], neurosurgery does not round at [facility].     [SO]   2123 Spoke with patient's Family doctor, Dr. Ayo Horner, he [facility] as there are no beds at [facility] where neurosurgery can evaluate her.    [SO]   2123 [facility] as she has been there before.    [SO]   [phone] with Dr. German Bojorquez, Hospitalist at [facility] accepting the patient.    [SO]      ED Course User Index  [SO] Edith Najjar, DO       --------------------------------------------- PAST HISTORY ---------------------------------------------  Past Medical History:  has a past medical history of Arthritis, Chronic back pain, COPD (chronic obstructive pulmonary disease) ([facility] [address] Diabetes mellitus ([facility] [address] GERD (gastroesophageal reflux disease), H/O cardiovascular stress test, Hyperlipidemia, and Thyroid disease. Past Surgical History:  has a past surgical history that includes Thyroidectomy; Hysterectomy; Appendectomy; Cholecystectomy; Breast surgery; joint replacement; Colonoscopy; Endoscopy, colon, diagnostic; and Knee Arthroplasty (Right, 1/15/2014). Social History:  reports that she has been smoking. She has a 60.00 pack-year smoking history. She has never used smokeless tobacco. She reports that she does not drink alcohol or use drugs. Family History: family history is not on file. The patients home medications have been reviewed. Allergies: Latex;  Codeine; and Morphine    -------------------------------------------------- RESULTS -------------------------------------------------    Lab  Results for orders placed or performed during the hospital encounter of 10/25/20   CBC auto differential   Result Value Ref Range    WBC 12.3 (H) 4.5 - 11.5 E9/L    RBC 4.95 3.50 - 5.50 E12/L    Hemoglobin 13.8 11.5 - 15.5 g/dL    Hematocrit 42.0 34.0 - 48.0 %    MCV 84.8 80.0 - 99.9 fL    MCH 27.9 26.0 - 35.0 pg    MCHC 32.9 32.0 - 34.5 %    RDW 13.2 11.5 - 15.0 fL    Platelets 404 951 - 280 E9/L    MPV 9.8 7.0 - 12.0 fL    Neutrophils % 78.2 43.0 - 80.0 %    Immature Granulocytes % 0.6 0.0 - 5.0 %    Lymphocytes % 12.7 (L) 20.0 - 42.0 %    Monocytes % 7.6 2.0 - 12.0 %    Eosinophils % 0.4 0.0 - 6.0 %    Basophils % 0.5 0.0 - 2.0 %    Neutrophils Absolute 9.65 (H) 1.80 - 7.30 E9/L    Immature Granulocytes # 0.07 E9/L Lymphocytes Absolute 1.57 1.50 - 4.00 E9/L    Monocytes Absolute 0.94 0.10 - 0.95 E9/L    Eosinophils Absolute 0.05 0.05 - 0.50 E9/L    Basophils Absolute 0.06 0.00 - 0.20 Q2/G   Basic metabolic panel   Result Value Ref Range    Sodium 129 (L) 132 - 146 mmol/L    Potassium 5.2 (H) 3.5 - 5.0 mmol/L    Chloride 93 (L) 98 - 107 mmol/L    CO2 23 22 - 29 mmol/L    Anion Gap 13 7 - 16 mmol/L    Glucose 193 (H) 74 - 99 mg/dL    BUN 26 (H) 8 - 23 mg/dL    CREATININE 0.9 0.5 - 1.0 mg/dL    GFR Non-African American >60 >=60 mL/min/1.73    GFR African American >60     Calcium 9.2 8.6 - 10.2 mg/dL   Protime-INR   Result Value Ref Range    Protime >120.0 (H) 9.3 - 12.4 sec    INR >10.0 (HH)        Radiology  CT THORACIC SPINE WO CONTRAST   Final Result   1. Findings consistent with a subacute compression fracture of the T12   vertebral body with up to 45% loss of height. 2. Old compression fracture of T7.   3. Generalized osteopenia. Otherwise, no acute fracture seen in the thoracic   spine. 4. Degenerative change. Slight dextroscoliosis. CT LUMBAR SPINE WO CONTRAST   Final Result   1. Generalized osteopenia. Mild compression deformity of the superior   endplate of L3 with large Schmorl's node. The findings are consistent with   subacute fracture, slightly progressed compared to plain films from   10/06/2020.   2. Otherwise, no acute lumbar compression fractures seen. 3. Degenerative change. CT ABDOMEN PELVIS W IV CONTRAST Additional Contrast? None   Final Result   21 mm left renal mass, presumably renal cell carcinoma. T12 burst fracture with bony retropulsion causing moderate spinal canal   stenosis. 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 No   obstructing mass is identified.       RECOMMENDATIONS:   Correlate with serum bilirubin             ------------------------- NURSING NOTES AND VITALS REVIEWED ---------------------------  Date / Time Roomed:  10/25/2020 4:35 PM  ED Bed Assignment:  20/20    The nursing notes within the ED encounter and vital signs as below have been reviewed. No data found. Oxygen Saturation Interpretation: Normal      ------------------------------------------ PROGRESS NOTES ------------------------------------------  Re-evaluation(s):    Patients symptoms show no change  Repeat physical examination is not changed    I have spoken with the patient and discussed todays results, in addition to providing specific details for the plan of care and counseling regarding the diagnosis and prognosis. Their questions are answered at this time and they are agreeable with the plan. I have discussed the risks and benefits of transfer and they wish to proceed with the transfer. --------------------------------- ADDITIONAL PROVIDER NOTES ---------------------------------  Consultations:  Spoke with Dr. Lady Clark (Medicine). Discussed case. They recommend transfer to [address] [place] [address]. Spoke with Dr. Edelmira Crawford (Neurosurgery). Discussed case. They do not round at [facility] and there are no beds at [facility]. Spoke with Dr. Phill Ramirez, who will accept the patient to the [facility]. Reason for transfer: No neurosurgery capability locally. This patient's ED course included: a personal history and physicial examination and re-evaluation prior to disposition    This patient has remained hemodynamically stable and been closely monitored during their ED course. Clinical Impression  1. T12 burst fracture ([address]    2. Renal mass    3. Dilated bile duct          Disposition  Patient's disposition: Transfer to [facility]. Transferred by: EMS. Patient's condition is stable.            Kristin Rivera DO  10/27/20 1829

## 2020-10-26 NOTE — ED NOTES
Due to pulse oximetry of 89% pt. Placed on 2 liters oxygen via nasal canula, Dr. Blue Garza aware.      Vesta Gaucher, RN  10/25/20 4420

## 2020-10-26 NOTE — ED NOTES
Pt. Report called to [address] [facility] [address], spoke to Grace Rasmussen RN.      Glenny Patel RN  10/25/20 0352

## 2020-10-26 NOTE — ED NOTES
[facility] here to transport patient to [facility].      Italia Juarez RN  10/25/20 3152

## 2020-11-17 PROBLEM — J18.9 PNEUMONIA: Status: ACTIVE | Noted: 2020-01-01

## 2020-11-17 NOTE — ED PROVIDER NOTES
ED Attending  CC: No                                                                                                                                        Department of Emergency Medicine   ED  Provider Note  Admit Date/RoomTime: 11/17/2020  5:06 PM  ED Room: 06/06        HPI:  11/17/20,   Time: 5:30 PM JASON Mack is a 79 y.o. female presenting to the ED for N/V/D and lightheadedness, beginning 5 days ago. The complaint has been intermittent, moderate in severity, and worsened by standing, walking. The patient arrives via EMS stating that her PCP wanted her seen for possible C. difficile enteritis. The patient states that she was recently on an antibiotic though she is not certain of its name. She reports that over the past 5 days she has had intermittent nausea, vomiting, diarrhea, and dizziness. She describes the dizziness as being lightheaded or weak. Not true vertigo with room spinning. Pt states she has felt run down/tired. Mild lower abdominal pain reported as well. Has had chills but no fevers. Denies urinary burning, frequency or hematuria. Recent history of a fall with traumatic burst fracture T 12 ] and L3. Seen by neurosurgery and determined that no surgery indicated.            ROS:     Constitutional: Negative for fever and chills  HENT: Negative for ear pain, sore throat and sinus pressure  Eyes: Negative for pain, discharge and redness  Respiratory:  Negative for shortness of breath, cough and wheezing  Cardiovascular: Negative for CP, edema or palpitations  Gastrointestinal: See HPI  Genitourinary: Negative for dysuria and frequency  Musculoskeletal: Negative for back pain and arthralgia  Skin: Negative for rash and wound  Neurological: See HPi  Hematological: Negative for adenopathy    All other systems reviewed and are negative      -------------------------------- PAST HISTORY ----------------------------------  Past Medical History:  has a past medical history of Arthritis, Chronic back pain, COPD (chronic obstructive pulmonary disease) ([facility] [address] Diabetes mellitus ([facility] [address] GERD (gastroesophageal reflux disease), H/O cardiovascular stress test, Hyperlipidemia, and Thyroid disease. Past Surgical History:  has a past surgical history that includes Thyroidectomy; Hysterectomy; Appendectomy; Cholecystectomy; Breast surgery; joint replacement; Colonoscopy; Endoscopy, colon, diagnostic; and Knee Arthroplasty (Right, 1/15/2014). Social History:  reports that she has been smoking. She has a 60.00 pack-year smoking history. She has never used smokeless tobacco. She reports that she does not drink alcohol or use drugs. Family History: family history is not on file. The patients home medications have been reviewed. Allergies: Latex;  Codeine; and Morphine    --------------------------------- RESULTS ------------------------------------------  All laboratory and radiology results have been personally reviewed by myself   LABS:  Results for orders placed or performed during the hospital encounter of 11/17/20   CBC Auto Differential   Result Value Ref Range    WBC 11.7 (H) 4.5 - 11.5 E9/L    RBC 4.02 3.50 - 5.50 E12/L    Hemoglobin 10.9 (L) 11.5 - 15.5 g/dL    Hematocrit 35.7 34.0 - 48.0 %    MCV 88.8 80.0 - 99.9 fL    MCH 27.1 26.0 - 35.0 pg    MCHC 30.5 (L) 32.0 - 34.5 %    RDW 14.5 11.5 - 15.0 fL    Platelets 415 821 - 418 E9/L    MPV 9.8 7.0 - 12.0 fL    Neutrophils % 71.6 43.0 - 80.0 %    Immature Granulocytes % 1.7 0.0 - 5.0 %    Lymphocytes % 17.0 (L) 20.0 - 42.0 %    Monocytes % 7.8 2.0 - 12.0 %    Eosinophils % 1.0 0.0 - 6.0 %    Basophils % 0.9 0.0 - 2.0 %    Neutrophils Absolute 8.36 (H) 1.80 - 7.30 E9/L    Immature Granulocytes # 0.20 E9/L    Lymphocytes Absolute 1.99 1.50 - 4.00 E9/L    Monocytes Absolute 0.91 0.10 - 0.95 E9/L    Eosinophils Absolute 0.12 0.05 - 0.50 E9/L    Basophils Absolute 0.10 0.00 - 0.20 E9/L   Comprehensive Metabolic Panel   Result Value Ref Range    Sodium 128 (L) 132 - 146 mmol/L    Potassium 3.7 3.5 - 5.0 mmol/L    Chloride 95 (L) 98 - 107 mmol/L    CO2 21 (L) 22 - 29 mmol/L    Anion Gap 12 7 - 16 mmol/L    Glucose 199 (H) 74 - 99 mg/dL    BUN 25 (H) 8 - 23 mg/dL    CREATININE 0.9 0.5 - 1.0 mg/dL    GFR Non-African American >60 >=60 mL/min/1.73    GFR African American >60     Calcium 7.5 (L) 8.6 - 10.2 mg/dL    Total Protein 6.3 (L) 6.4 - 8.3 g/dL    Alb 2.4 (L) 3.5 - 5.2 g/dL    Total Bilirubin <0.2 0.0 - 1.2 mg/dL    Alkaline Phosphatase 140 (H) 35 - 104 U/L    ALT 11 0 - 32 U/L    AST 19 0 - 31 U/L   Lactic Acid, Plasma   Result Value Ref Range    Lactic Acid 1.3 0.5 - 2.2 mmol/L   Urinalysis   Result Value Ref Range    Color, UA Yellow Straw/Yellow    Clarity, UA CLOUDY (A) Clear    Glucose, Ur Negative Negative mg/dL    Bilirubin Urine Negative Negative    Ketones, Urine Negative Negative mg/dL    Specific Gravity, UA 1.025 1.005 - 1.030    Blood, Urine TRACE (A) Negative    pH, UA 6.0 5.0 - 9.0    Protein, UA >=300 (A) Negative mg/dL    Urobilinogen, Urine 0.2 <2.0 E.U./dL    Nitrite, Urine Negative Negative    Leukocyte Esterase, Urine SMALL (A) Negative   Protime-INR   Result Value Ref Range    Protime 34.2 (H) 9.3 - 12.4 sec    INR 3.0    Microscopic Urinalysis   Result Value Ref Range    WBC, UA >20 (A) 0 - 5 /HPF    RBC, UA 1-3 0 - 2 /HPF    Epithelial Cells, UA FEW /HPF    Bacteria, UA MANY (A) None Seen /HPF   COVID-19   Result Value Ref Range    SARS-CoV-2, NAAT Not Detected Not Detected   Arterial Blood Gas, Respiratory Only   Result Value Ref Range    Source: Arterial     pH, Blood Gas 7.346 (L) 7.350 - 7.450    pCO2, Arterial 36.6 35.0 - 45.0 mmHg    pO2, Arterial 54.2 (L) 60.0 - 80.0 mmHg    HCO3, Arterial 20.0 (L) 22.0 - 26.0 mmol/L    B.E. -5.1 (L) -3.0 - 3.0 mmol/L    O2 Sat 86.3 (L) 92.0 - 98.5 %     ID 9,097     DEVICE 15,065,521,400,933     Delivery Systems [employer]    EKG 12 Lead   Result Value Ref Range Ventricular Rate 106 BPM    Atrial Rate 106 BPM    P-R Interval 122 ms    QRS Duration 82 ms    Q-T Interval 350 ms    QTc Calculation (Bazett) 464 ms    P Axis 41 degrees    R Axis 65 degrees    T Axis 54 degrees       RADIOLOGY:  Interpreted by Radiologist.  XR CHEST (2 VW)   Final Result   Hyperinflation. Coarsened bibasilar interstitial opacities with new right   upper lobe airspace disease. Findings could represent interstitial alveolar   edema versus developing pneumonia. Follow-up to assure resolution following medical treatment course recommended. ----------------- NURSING NOTES AND VITALS REVIEWED ---------------   The nursing notes within the ED encounter and vital signs as below have been reviewed. BP (!) 173/91   Pulse 110   Temp 98.7 °F (37.1 °C) (Oral)   Resp 19   Ht 5' 5\" (1.651 m)   Wt 115 lb (52.2 kg)   SpO2 93%   BMI 19.14 kg/m²   Oxygen Saturation Interpretation: Normal      --------------------------------PHYSICAL EXAM------------------------------------      Constitutional/General: Alert and oriented x3, well appearing, non toxic in NAD  Head: NC/AT  Eyes: PERRL, EOMI  Mouth: Dry oral mucosa  Neck: Supple, full ROM, no meningeal signs  Pulmonary: Lungs clear to auscultation bilaterally, no wheezes, rales, or rhonchi. Not in respiratory distress  Cardiovascular:  Regular rate and rhythm, no murmurs, gallops, or rubs. 2+ distal pulses  Abdomen: Soft, + BS. No distension. Minimal diffuse lower abdominal discomfort. No palpable rigidity, rebound or guarding  Extremities: Moves all extremities x 4. Warm and well perfused  Skin: warm and dry without rash  Neurologic: GCS 15,  Intact.   No focal deficits  Psych: Normal Affect      ------------------------ ED COURSE/MEDICAL DECISION MAKING----------------------  Medications   cefepime (MAXIPIME) 2 g IVPB extended (mini-bag) (2 g Intravenous New Bag 11/17/20 4882)   0.9 % sodium chloride bolus (0 mLs Intravenous Stopped 11/17/20 8563)   0.9 % sodium chloride bolus (0 mLs Intravenous Stopped 11/17/20 2052)   vancomycin (VANCOCIN) 1,250 mg in dextrose 5 % 250 mL IVPB (0 mg Intravenous Stopped 11/17/20 2218)         Medical Decision Making:    Urine and blood cultures pending. Given 2 L fluids here. Discussed plan for admission with PCP. He is requesting rapid covid and ABG's. Will call back with results. Pt placed on 2 L oxygen. Testing negative for Covid    See ABG. No evidence of CO2 retention. Discussed with Dr. Evelyn Roldan. Willing to admit to telemetry. Abs running at this time. Counseling: The emergency provider has spoken with the patient and discussed todays results, in addition to providing specific details for the plan of care and counseling regarding the diagnosis and prognosis. Questions are answered at this time and they are agreeable with the plan.      ------------------------ IMPRESSION AND DISPOSITION -------------------------------    IMPRESSION  1. Pneumonia due to organism    2. Nausea vomiting and diarrhea    3. Hyponatremia    4.  Acute cystitis without hematuria        DISPOSITION  Disposition: Admit to telemetry  Patient condition is stable                   Netta Quinteros PA-C  11/17/20 2702

## 2020-11-17 NOTE — ED NOTES
Patient reports vomiting twice this morning. Currently says she is feeling better and wants water. Patient had iv saline in squad.       Ubaldo Dumont RN  11/17/20 8373

## 2020-11-18 NOTE — PROGRESS NOTES
Physical Therapy    Physical Therapy Initial Evaluation    Room #:  4451/5295-24  Patient Name: Therese Cuevas  YOB: 1940  MRN: [id number]    Referring Provider:   Darlene Owens MD     Date of Service: 11/18/2020    Evaluating Physical Therapist: Rhea Watkins, PT  #[id number]       Diagnosis:   Pneumonia [J18.9] Admitted with   nausea, vomiting, diarrhea        Patient Active Problem List   Diagnosis    Breast mass, right    DJD (degenerative joint disease) of knee    Knee pain    Iron (Fe) deficiency anemia    Diabetes mellitus type II, controlled ([address]    Chronic obstructive pulmonary disease (HCC)    Atelectasis    Hyponatremia    S/P total knee arthroplasty    Status post total bilateral knee replacement    Localized osteoarthrosis, lower leg    Failed total left knee replacement (HCC)    Altered mental status    Generalized weakness    Weakness of left lower extremity    Closed nondisplaced fracture of acromial end of right clavicle    Intertrochanteric fracture of right hip, closed, initial encounter ([address]    Essential hypertension    Mixed hyperlipidemia    Bronchospasm    Tachycardia    Traumatic arthritis of wrist    Sepsis secondary to cat scratch ([address]    Pneumonia        Tentative placement recommendation: Home Health Physical Therapy with 24/7 assist    Equipment recommendation: None      Prior Level of Function: Patient ambulated with supervision     Rehab Potential: good  for baseline    Past medical history:   Past Medical History:   Diagnosis Date    Arthritis     joints, knees, back  djd    Cerebral artery occlusion with cerebral infarction ([address]     Chronic back pain     COPD (chronic obstructive pulmonary disease) ([address]     Diabetes mellitus ([address]     GERD (gastroesophageal reflux disease)     H/O cardiovascular stress test 11/12/2018    lexiscan stress test    Hyperlipidemia     Thyroid disease      Past Surgical History:   Procedure Laterality Date  APPENDECTOMY      BREAST SURGERY      cyst     CHOLECYSTECTOMY      COLONOSCOPY      ENDOSCOPY, COLON, DIAGNOSTIC      HYSTERECTOMY      JOINT REPLACEMENT      left knee    KNEE ARTHROPLASTY Right 1/15/2014    TOTAL KNEE ARTHROPLASTY    THYROIDECTOMY         Precautions: Activity as tolerated, falls, alarm and dementia ,      SUBJECTIVE:    Social history: Patient lives with spouse and dtr who states 24/7 in her house in a ranch home  with 1 step  to enter     Equipment owned: Cane, Wheeled Walker, Bedside commode, Shower chair and O2,       [address]   How much difficulty turning over in bed?: A Little  How much difficulty sitting down on / standing up from a chair with arms?: A Little  How much difficulty moving from lying on back to sitting on side of bed?: A Little  How much help from another person moving to and from a bed to a chair?: A Little  How much help from another person needed to walk in hospital room?: A Little  How much help from another person for climbing 3-5 steps with a railing?: A Lot  AM-PAC Inpatient Mobility Raw Score : 17  AM-PAC Inpatient T-Scale Score : 42.13  Mobility Inpatient CMS 0-100% Score: 50.57  Mobility Inpatient CMS G-Code Modifier : CK    Nursing cleared patient for PT evaluation. The admitting diagnosis and active problem list as listed above have been reviewed prior to the initiation of this evaluation. OBJECTIVE;   Initial Evaluation  Date: 11/18/20 Treatment Date:     Short Term/ Long Term   Goals   Was pt agreeable to Eval/treatment? Yes    To be met in 3 days   Pain level   0/10        Bed Mobility    Rolling: Supervision     Supine to sit: Supervision     Sit to supine: Supervision     Scooting: Supervision     Rolling: Independent    Supine to sit:  Independent    Sit to supine: Independent    Scooting: Independent     Transfers Sit to stand: Minimal assist of 1  From chair, Bedside commode and bed for safety and redirection  Sit to stand: Independent     Ambulation    3x20 feet using  wheeled walker with Minimal assist of 1   for balance and o2 line management   75 feet using  wheeled walker with Supervision     ROM Within functional limits       Strength BUE:  refer to OT eval  RLE:  4/5  LLE:  4/5   Increase strength in affected mm groups by 1/3 grade   Balance Sitting EOB:  good    Dynamic Standing:  fair    Sitting EOB:  good    Dynamic Standing: good       Patient is Alert & Oriented x person, place, time and situation and follows directions    Sensation:  Patient  denies numbness and tingling     Edema:  no   Endurance: fair       Patient education  Patient educated on role of Physical Therapy, risks of immobility, safety and plan of care      Patient response to education:   Pt verbalized understanding Pt demonstrated skill Pt requires further education in this area   Yes Partial Yes      Treatment:  Patient practiced and was instructed/facilitated in the following treatment: Patient   Sat edge of bed 5 minutes with Supervision  to increase dynamic sitting balance and activity tolerance. gait and trf training with cuing for hand placement, walker use and balance     Therapeutic Exercises:  ankle pumps      At end of session, patient in bed with alarm (most sensitive) call light and phone within reach,   all lines and tubes intact, nursing notified. Patient would benefit from continued skilled Physical Therapy to improve functional independence and quality of life. Patient's/ family goals   home        ASSESSMENT: Patient exhibits decreased strength, balance, coordination impairing functional mobility. Fatigues easily and requires frequent rests and redirection to task.       Plan of Care:     -Standing Balance: Perform strengthening exercises in standing to promote motor control with or without upper extremity support   -Transfers: Provide instruction on proper hand and foot position for adequate transfer of weight onto lower extremities and use of gait device  -Gait: Gait training and Standing activities to improve: base of support, weight shift, weight bearing    -Endurance: Utilize Supervised activities to increase level of endurance to allow for safe functional mobility including transfers and gait     Patient and or family understand(s) diagnosis, prognosis, and plan of care. Frequency of treatments: Patient will be seen    daily. Time in  1157  Time out  1215    Total Treatment Time  8 minutes    Evaluation time includes thorough review of current medical information, gathering information on past medical history/social history and prior level of function, completion of standardized testing/informal observation of tasks, assessment of data, and development of Plan of care and goals.      CPT codes:  Low Complexity PT evaluation (40638)  Gait Training (41037) 8 minutes 1 unit(s)     Temo Mahan PT

## 2020-11-18 NOTE — ED NOTES
Patient pulled out iv replaced with [address] [facility], RN  11/17/20 4242

## 2020-11-18 NOTE — PROGRESS NOTES
Bedside commode and bed for safety and redirection Sit to stand: Minimal assist of 1 with cues for safety   Stand pivot: Not assessed      Sit to stand: Independent     Ambulation    3x20 feet using  wheeled walker with Minimal assist of 1   for balance and o2 line management 2 x 30 feet using  wheeled walker with Minimal assist of 1 cues for safety    75 feet using  wheeled walker with Supervision     ROM Within functional limits       Strength BUE:  refer to OT eval  RLE:  4/5  LLE:  4/5   Increase strength in affected mm groups by 1/3 grade   Balance Sitting EOB:  good    Dynamic Standing:  fair   Sitting EOB:  good     Dynamic Standing:  fair      Sitting EOB:  good    Dynamic Standing: good       Patient is Alert & Oriented x person, place, time and situation and follows directions    Sensation:  Patient  denies numbness and tingling     Edema:  no   Endurance: fair       Patient education  Patient educated on role of Physical Therapy, risks of immobility, safety and plan of care      Patient response to education:   Pt verbalized understanding Pt demonstrated skill Pt requires further education in this area   Yes Partial Yes      Treatment:  Patient practiced and was instructed/facilitated in the following treatment: Patient   Sat edge of bed 5 minutes with Supervision  to increase dynamic sitting balance and activity tolerance. gait and trf training with cuing for hand placement, walker use and balance, pt stood at window and sat at chair by window. Pt amb again and returned to sit at side of bed. Gown changed, drink provided. Nursing notified      Therapeutic Exercises:  not performed      At end of session, patient in bed with alarm (most sensitive) call light and phone within reach,   all lines and tubes intact, nursing notified. Patient would benefit from continued skilled Physical Therapy to improve functional independence and quality of life.           Patient's/ family goals   home ASSESSMENT: Patient exhibits decreased strength, balance, coordination impairing functional mobility. Fatigues easily and requires frequent rests and redirection to task. Pt with decreased insight into deficits. Plan of Care:     -Standing Balance: Perform strengthening exercises in standing to promote motor control with or without upper extremity support   -Transfers: Provide instruction on proper hand and foot position for adequate transfer of weight onto lower extremities and use of gait device  -Gait: Gait training and Standing activities to improve: base of support, weight shift, weight bearing    -Endurance: Utilize Supervised activities to increase level of endurance to allow for safe functional mobility including transfers and gait     Patient and or family understand(s) diagnosis, prognosis, and plan of care. Frequency of treatments: Patient will be seen    daily.        Time in  320  Time out  350    Total Treatment Time  30 minutes    CPT codes:    Therapeutic activities (18638)   15 minutes  1 unit(s)  Gait Training ([id number]) 15 minutes 1 unit(s)     Sally Ayon, PTA  [id number]

## 2020-11-18 NOTE — PROGRESS NOTES
Occupational Therapy  OCCUPATIONAL THERAPY INITIAL EVALUATION      Date:2020  Patient Name: Josselin Juárez  MRN: [id number]  : 1940  Room: [address] [place] [address]    Referring Provider: Arslan Klein MD     Evaluating OT: Lucianoangelina Aparicio OTR/L #[id number]    AM-PAC Daily Activity Raw Score:     Recommended Placement: Subacute rehab  Recommended Adaptive Equipment: TBD     Diagnosis:   1. Pneumonia due to organism    2. Nausea vomiting and diarrhea    3. Hyponatremia    4. Acute cystitis without hematuria        Surgery: N/A      Pertinent Medical History:    Past Medical History:   Diagnosis Date    Arthritis     joints, knees, back  djd    Cerebral artery occlusion with cerebral infarction ([facility] [address]     Chronic back pain     COPD (chronic obstructive pulmonary disease) (HCC)     Diabetes mellitus (HCC)     GERD (gastroesophageal reflux disease)     H/O cardiovascular stress test 2018    lexiscan stress test    Hyperlipidemia     Thyroid disease       Precautions:  Falls, alarm, confused, poor historian, c diff/contact, incontinent, O2 (2L)      Home Living: Pt unable to provide consistent information. Prior Level of Function: Pt reporting she is independent with ADLs , her daughter assists with IADLs; ambulated without AD, 2L O2  Occupation: Retired     Pain Level: -/10  Cognition: A&O: 1/4; Follows 1 step directions with tactile cuing for initiation. Memory:  Poor   Sequencing:  Poor   Problem solving:  Poor   Judgement/safety:  Poor     Functional Assessment:   Initial Eval Status  Date: 20 Treatment Status  Date: STGs = LTGs  Time frame: 5-7 days   Feeding Modified [place]   Assist with set-up,opening containers. Grooming Moderate Assist   sitting in chair, poor initiation/sequencing, requiring tactile cuing  Minimal Assist    UB Dressing Moderate Assist   Don/doff gown while sitting EOB, assist/cuing on sequencing, balance.    Minimal Assist    LB Dressing Maximal Assist Don/doff depends  Max assist/cuing for sequencing, threading feet, and balance upon standing to pull up brief. Moderate Assist    Bathing Maximal Assist    Moderate Assist    Toileting Maximal Assist   Pt incontinent of stool and urine. Max assist for hygiene while standing. Cuing on safety. Moderate Assist    Bed Mobility  Supine to sit: Moderate Assist   Sit to supine: Moderate Assist of 2  Assist with intiation of BLE out of bed  Supine to sit: Minimal Assist   Sit to supine: Minimal Assist    Functional Transfers Moderate Assist   Sit<>stand  Bed, chair  Max cuing on hand placement, body mechanics. Minimal Assist    Functional Mobility Moderate Assist of 2  HHA  Several steps to/from chair  Max tactile cuing to weight shift/take steps, sequecing  Minimal Assist    Balance Sitting:     Static:  fair    Dynamic:fair  Standing: fair  Sitting:     Static:  Good    Dynamic:Good  Standing: fair+   Activity Tolerance Fair-  Fair+   Visual/  Perceptual Glasses: Yes   WFL       Hand Dominance Right     Strength ROM Additional Info:    RUE  4-/5  WFL good  and wfl FMC/dexterity noted during ADL tasks       LUE 4-/5  WFL good  and wfl FMC/dexterity noted during ADL tasks       Hearing: WFL   Sensation:  No c/o numbness or tingling   Tone: WFL   Edema: None noted    Comments:   Nursing approved therapy session. Upon arrival, patient supine in bed and agreeable to OT session. Therapist educated pt on role of OT. At end of session, patient supine in bed with call light and phone within reach, alarm on, all lines and tubes intact; nursing aware. Pt required max cuing on safety throughout session. Pt demonstrated poor understanding of education/techniques and decreased independence and safety during completion of ADL/functional transfer/mobility tasks.   Pt would benefit from continued skilled OT to increase safety and independence with completion of ADL/IADL tasks for functional independence and quality of life.    Treatment:   Skilled occupational therapy services provided include instruction/training on safety and adapted techniques for completion of therapeutic activities, ADLs/IADLs. Skilled monitoring of O2 sats, HR, and pt response throughout treatment.  Therapist facilitated therapeutic activities: bed mobility, functional transfers, graded functional activities (static/dynamic sitting, static/dynamic standing, functional reaching), and functional ambulation - providing max cuing (verbal, visual, tactile) on AD management, hand placement, body mechanics, posture, breathing techniques, energy conservation, compensatory strategies, and safety.  Therapist facilitated self-care retraining: UB dressing, LB dressing, grooming, toileting, and feeding tasks - providing max cuing (verbal, visual, tactile) on body mechanics, posture, breathing techniques, energy conservation, compensatory strategies, and safety. Therapist educated pt on compensatory strategies/energy conservation techniques to safely complete ADLs/IADLs.      Eval Complexity: Low    Assessment of current deficits   Functional mobility [x]  ADLs [x] Strength [x]  Cognition [x]  Functional transfers  [x] IADLs [x] Safety Awareness [x]  Endurance [x]  Fine Motor Coordination [] Balance [x] Vision/perception [] Sensation []   Gross Motor Coordination [x] ROM [] Delirium []                  Motor Control []    Plan of Care: 1-3 days/week for 1-2 weeks PRN   [x]ADL retraining/adapted techniques and AE recommendations to increase functional independence within precautions                    [x]Energy conservation techniques to improve tolerance for selfcare routine   [x]Functional transfer/mobility training/DME recommendations for increased independence, safety and fall prevention         [x]Patient/family education to increase safety and functional independence             [x]Environmental modifications for safe mobility and completion of ADLs plan of care and goals.     Erna Wood, OTR/L #[id number]

## 2020-11-18 NOTE — H&P
lb 11.2 oz (60.2 kg), SpO2 98 %. Physical Exam   67-year-old white female alert pleasant oriented to person but not to place or date unable to give history. HEENT pertinent to dry tongue dry mucous membrane  Neck supple no JVDs no carotid bruit  Heart regular rate and rhythm no gallop or rub positive systolic murmur at the left sternal border  Breast exam was deferred  Lungs bibasilar inspiratory rales scattered rhonchi resonant to percussion  Abdomen soft nontender bowel sounds present no organomegaly no ascites no bruit  Extremities symmetrical no pedal edema no calf tenderness Homans' sign negative bilaterally pedal pulses equal bilaterally 2+   Skin warm and dry diminished turgor  Neuro exam no focal motor deficit cranial nerves intact positive for mental confusion.   Rectal and pelvic exam deferred  Lymph node exam unremarkable    CBC:   Lab Results   Component Value Date    WBC 16.5 11/18/2020    RBC 4.31 11/18/2020    HGB 11.9 11/18/2020    HCT 37.2 11/18/2020    MCV 86.3 11/18/2020    MCH 27.6 11/18/2020    MCHC 32.0 11/18/2020    RDW 14.5 11/18/2020     11/18/2020    MPV 10.2 11/18/2020     CMP:    Lab Results   Component Value Date     11/18/2020    K 3.3 11/18/2020    K 4.4 10/06/2020    CL 98 11/18/2020    CO2 20 11/18/2020    BUN 17 11/18/2020    CREATININE 0.8 11/18/2020    GFRAA >60 11/18/2020    LABGLOM >60 11/18/2020    GLUCOSE 127 11/18/2020    GLUCOSE 158 10/03/2011    PROT 6.1 11/18/2020    LABALBU 2.5 11/18/2020    LABALBU 4.0 10/03/2011    CALCIUM 7.8 11/18/2020    BILITOT 0.2 11/18/2020    ALKPHOS 135 11/18/2020    AST 15 11/18/2020    ALT 10 11/18/2020     PT/INR:    Lab Results   Component Value Date    PROTIME 27.1 11/18/2020    INR 2.4 11/18/2020     U/A:    Lab Results   Component Value Date    COLORU Yellow 11/17/2020    PROTEINU >=300 11/17/2020    PHUR 6.0 11/17/2020    LABCAST RARE 11/11/2018    WBCUA >20 11/17/2020    WBCUA 2-5 04/07/2011    RBCUA 1-3 11/17/2020 Until Specified    Assess skin per unit guidelines     Frequency: Until Discontinued     Number of Occurrences: Until Specified    HYPOGLYCEMIA TREATMENT: blood glucose less than 50 mg/dL and patient  ALERT and TOLERATING PO     Frequency: PRN     Number of Occurrences: Until Specified     Order Comments: Give 8 ounces juice or regular soda or 2 tubes glucose gel. Repeat blood glucose in 15 minutes. If blood glucose is less than 70 mg/dL, repeat treatment and recheck blood glucose in 15 minutes x2 and notify provider. HYPOGLYCEMIA TREATMENT: blood glucose less than 70 mg/dL and patient ALERT and TOLERATING PO     Frequency: PRN     Number of Occurrences: Until Specified     Order Comments: Give 4 ounces juice or regular soda or 1 tube glucose gel. Repeat blood glucose in 15 minutes. If blood glucose is less than 70 mg/dL, repeat treatment and recheck blood glucose in 15 minutes x2. If blood glucose remains less than 70 mg/dL, notify provider. HYPOGLYCEMIA TREATMENT: blood glucose less than 70 mg/dL and patient NOT ALERT or NPO     Frequency: PRN     Number of Occurrences: Until Specified     Order Comments: Give dextrose 50% intravenous. If patient does not respond within 5 minutes, repeat dose x1. Start D5W at 100 mL/hour until ordering provider can be reached. Repeat blood glucose in 15 minutes. If blood glucose is less than 70 mg/dL, repeat treatment and recheck blood glucose in 15 minutes x2. Notify provider. If no intravenous access, administer glucagon 1 mg. After administration, attempt intravenous access and start D5W at 100 mL/hr. Repeat blood glucose in 15 minutes x2 and notify provider.       Maintain HOB at the lowest elevation consistent with medical plan of care     Frequency: Until Discontinued     Number of Occurrences: Until Specified    Pad/offload medical devices     Frequency: 1 Time     Number of Occurrences: 1 Occurrences    Turn or assist with turn approximately every 2 hours if patient is unable to turn self. Remind patient to turn if necessary. Frequency: Until Discontinued     Number of Occurrences: Until Specified   Code Status    Full code     Frequency: Continuous     Number of Occurrences: Until Specified   Nourishments    Dietary Nutrition Supplements: Standard High Calorie Oral Supplement     Frequency: 2x Daily at Lunch and [address]     Number of Occurrences: Until Specified     Order Comments: ensure     Respiratory Care    Nasal Cannula Oxygen     Frequency: Daily     Number of Occurrences: Until Specified     Order Comments: Check SpO2 initially then every 2-5 minutes until reach goals, then 30 minutes after reach goal, then daily and PRN. Below SpO2 Goal: increase 2 L/min every 2 minutes (if SpO2 5% or more below goal, place on 6 L/min). Max dose: 6 L/min. Notify provider if not meeting goal on max dose. Above SpO2 Goal: decrease 1 L/min every 30 minutes until reach goal.    Attempt at least once daily to wean off oxygen by decreasing by 1 L/min every 30 minutes as long as SpO2 does not go below Goal. If patient maintains SpO2 Goal on room air for 24 hours, discontinue oxygen order using Per Protocol order mode. Nasal Cannula Oxygen     Frequency: Daily     Number of Occurrences: Until Specified     Order Comments: Check SpO2 initially then every 2-5 minutes until reach goals, then 30 minutes after reach goal, then daily and PRN. Below SpO2 Goal: increase 2 L/min every 2 minutes (if SpO2 5% or more below goal, place on 6 L/min). Max dose: 6 L/min. Notify provider if not meeting goal on max dose. Above SpO2 Goal: decrease 1 L/min every 30 minutes until reach goal.    Attempt at least once daily to wean off oxygen by decreasing by 1 L/min every 30 minutes as long as SpO2 does not go below Goal. If patient maintains SpO2 Goal on room air for 24 hours, discontinue oxygen order using Per Protocol order mode.          Point of Care Testing    POCT glucose Frequency: 4x Daily AC & HS     Number of Occurrences: Until Specified     Order Comments: If blood sugar is below 110 at bedtime, then check blood glucose at 0200. POCT Glucose     Frequency: PRN     Number of Occurrences: Until Specified     Order Comments: Repeat blood glucose 15 minutes following intervention. If blood glucose is less than 70 mg/dL, repeat treatment and recheck blood glucose in 15 minutes x2. If blood glucose remains less than 70 mg/dL, call ordering provider for further instruction. If patient experienced a hypoglycemic event in last 24 hours, obtain blood glucose at 0200.        Medications    0.9 % sodium chloride infusion     Linked Order: And     Frequency: Q12H     Dose: 25 mL     Route: Intravenous    0.9 % sodium chloride infusion     Frequency: Continuous     Route: Intravenous    acetaminophen (TYLENOL) tablet 500 mg     Frequency: Q6H PRN     Dose: 500 mg     Route: Oral    amLODIPine (NORVASC) tablet 5 mg     Frequency: Daily     Dose: 5 mg     Route: Oral    Arformoterol Tartrate (BROVANA) nebulizer solution 15 mcg     Linked Order: And     Frequency: BID     Dose: 15 mcg     Route: Nebulization    budesonide (PULMICORT) nebulizer suspension 500 mcg     Linked Order: And     Frequency: BID     Dose: 0.5 mg     Route: Nebulization    cefepime (MAXIPIME) 1 g IVPB extended (mini-bag)     Linked Order: And     Frequency: Q12H     Dose: 1 g     Route: Intravenous    clotrimazole-betamethasone (LOTRISONE) cream     Frequency: BID     Route: Topical    dextrose 5 % solution     Frequency: PRN     Dose: 100 mL/hr     Route: Intravenous    dextrose 50 % IV solution     Frequency: PRN     Dose: 12.5 g     Route: Intravenous    DULoxetine (CYMBALTA) extended release capsule 40 mg     Frequency: Nightly     Dose: 40 mg     Route: Oral    glimepiride (AMARYL) tablet 2 mg     Frequency: QAM AC     Dose: 2 mg     Route: Oral    glucagon (rDNA) injection 1 mg     Frequency: PRN     Dose: 1 mg     Route: Intramuscular    glucose (GLUTOSE) 40 % oral gel 15 g     Frequency: PRN     Dose: 15 g     Route: Oral    ipratropium-albuterol (DUONEB) nebulizer solution 3 mL     Frequency: 4x daily     Dose: 3 mL     Route: Inhalation    levothyroxine (SYNTHROID) tablet 137 mcg     Frequency: Daily     Dose: 137 mcg     Route: Oral    losartan (COZAAR) tablet 50 mg     Frequency: Nightly     Dose: 50 mg     Route: Oral    metoprolol succinate (TOPROL XL) extended release tablet 25 mg     Frequency: Daily     Dose: 25 mg     Route: Oral    mirabegron (MYRBETRIQ) extended release tablet 25 mg     Frequency: Nightly     Dose: 25 mg     Route: Oral    montelukast (SINGULAIR) tablet 10 mg     Frequency: Nightly     Dose: 10 mg     Route: Oral    ondansetron (ZOFRAN-ODT) disintegrating tablet 4 mg     Frequency: Q8H PRN     Dose: 4 mg     Route: Oral    potassium chloride (KLOR-CON M) extended release tablet 40 mEq     Frequency: Once     Dose: 40 mEq     Route: Oral    rosuvastatin (CRESTOR) tablet 20 mg     Frequency: Nightly     Dose: 20 mg     Route: Oral    vancomycin (VANCOCIN) 750 mg in dextrose 5 % 250 mL IVPB     Frequency: Q24H     Dose: 750 mg     Route: Intravenous    warfarin (COUMADIN) tablet 7.5 mg     Frequency: QPM     Dose: 7.5 mg     Route: Oral        Cassandra Friend  11/18/2020

## 2020-11-19 PROBLEM — N39.0 ACUTE URINARY TRACT INFECTION: Status: ACTIVE | Noted: 2020-01-01

## 2020-11-19 PROBLEM — A04.72 C. DIFFICILE COLITIS: Status: ACTIVE | Noted: 2020-01-01

## 2020-11-19 NOTE — PROGRESS NOTES
Department of Internal Medicine  General Internal Medicine  Attending Progress Note      Subjective: The patient is awake and alert. Patient has been having a lot of confusion and falls agitated  overnight. This morning she feels very calm and collected and denies chest pain, angina, and dyspnea. Denies abdominal pain. Tolerating diet. No nausea or vomiting. Patient is doing comfortably in bed but contracted. She would benefit from frequent repositioning and elevation of the head of the bed 30 degrees. Claiming that shortness of breath and cough has improved. Diarrhea has also mildly improved but still watery. C. difficile toxin was positive  Blood cultures came back showing positive gram-positive cocci in chains in 2 bottles  Urine culture showed more than 100,000 colonies of E. coli  Repeat COVID-19 test was negative. Objective:  Pt oriented, alert, coherent and logical    BP (!) 149/73   Pulse 104   Temp 98.9 °F (37.2 °C) (Oral)   Resp 18   Ht 5' 5\" (1.651 m)   Wt 132 lb 11.2 oz (60.2 kg)   SpO2 93%   BMI 22.08 kg/m²     Head and Neck: normal atraumatic, no neck masses, normal thyroid, no jvd    Heart:  regular rate and rhythm, S1, S2 normal, no murmur, click, rub or gallop    Lungs:  chest with scattered rhonchi and diminished air exchange bilateral bases no wheezing, rales,  symmetric air entry, no chest wall deformities or tenderness    Abd: soft, non-tender, with mild discomfort on the left lower quadrant, without masses or organomegaly    Extrem:  No clubbing, cyanosis, or edema    Neuro: no focal neurological deficit     Skin: No rashes, lesions, or ecchymosis, no ulcers. Psych: No apparent anxiety, agitation, or depression.      Labs:   CBC:   Lab Results   Component Value Date    WBC 10.9 11/19/2020    RBC 4.32 11/19/2020    HGB 11.7 11/19/2020    HCT 36.0 11/19/2020    MCV 83.3 11/19/2020    MCH 27.1 11/19/2020    MCHC 32.5 11/19/2020    RDW 14.7 11/19/2020     11/19/2020    MPV 10.0 11/19/2020     CMP:    Lab Results   Component Value Date     11/19/2020    K 3.5 11/19/2020    K 4.4 10/06/2020     11/19/2020    CO2 18 11/19/2020    BUN 16 11/19/2020    CREATININE 0.8 11/19/2020    GFRAA >60 11/19/2020    LABGLOM >60 11/19/2020    GLUCOSE 96 11/19/2020    GLUCOSE 158 10/03/2011    PROT 6.1 11/19/2020    LABALBU 2.6 11/19/2020    LABALBU 4.0 10/03/2011    CALCIUM 8.2 11/19/2020    BILITOT 0.3 11/19/2020    ALKPHOS 127 11/19/2020    AST 18 11/19/2020    ALT 10 11/19/2020     PT/INR:    Lab Results   Component Value Date    PROTIME 20.5 11/19/2020    INR 1.8 11/19/2020     U/A:    Lab Results   Component Value Date    COLORU Yellow 11/17/2020    PROTEINU >=300 11/17/2020    PHUR 6.0 11/17/2020    LABCAST RARE 11/11/2018    WBCUA >20 11/17/2020    WBCUA 2-5 04/07/2011    RBCUA 1-3 11/17/2020    RBCUA 1-3 01/22/2014    BACTERIA MANY 11/17/2020    CLARITYU CLOUDY 11/17/2020    SPECGRAV 1.025 11/17/2020    LEUKOCYTESUR SMALL 11/17/2020    UROBILINOGEN 0.2 11/17/2020    BILIRUBINUR Negative 11/17/2020    BILIRUBINUR NEGATIVE 04/07/2011    BLOODU TRACE 11/17/2020    GLUCOSEU Negative 11/17/2020    GLUCOSEU NEGATIVE 04/07/2011    C.diff Toxin/Antigen   Clostridium difficile EIA   Collected:  11/17/20 8433    Result status:  Final    Resulting lab:  [facility]    Value:  C. Difficile Toxin A and/or B detected   CONTACT PRECAUTIONS INDICATED   Normal Range: Not detected   Abnormal       *Additional information available - narrative    , Blood 2   Culture, Blood 2   Collected:  11/17/20 2011    Result status:  Preliminary    Resulting lab:  [facility]    Value:  Gram stain performed from blood culture bottle media   Gram positive cocci in chains   Abnormal       *Additional information available - narrative               chest x-ray  Narrative    EXAMINATION:    TWO XRAY VIEWS OF THE CHEST    11/17/2020 6:25 pm COMPARISON:    10/06/2020    HISTORY:    ORDERING SYSTEM PROVIDED HISTORY: Weakness/N/V    TECHNOLOGIST PROVIDED HISTORY:    Reason for exam:->Weakness/N/V    FINDINGS:    The cardiomediastinal silhouette is mildly enlarged in size and contour. Hyperinflation.  Coarsened bibasilar interstitial thickening and left    suprahilar opacity. Mellody Part right upper lobe opacity. Ivan Ehrich pleural effusion or    pneumothorax is present. Ricka Smoke.  Stable upper lumbar compression    fracture.  Stable mid thoracic spine compression fracture.         Impression    Hyperinflation.  Coarsened bibasilar interstitial opacities with new right    upper lobe airspace disease.  Findings could represent interstitial alveolar    edema versus developing pneumonia. Follow-up to assure resolution following medical treatment course recommended. Assessment:    Principal Problem:    Pneumonia  Active Problems:    Diabetes mellitus type II, controlled ([facility] [address]    Chronic obstructive pulmonary disease (HCC)    Altered mental status    Essential hypertension    Mixed hyperlipidemia    C. difficile colitis    Acute urinary tract infection  Resolved Problems:    * No resolved hospital problems. *      Plan:  1. Continue cefepime IV  2.  Increase vancomycin to 1000 mg IV daily check Vanco trough before next dose  3. 3.  Start Flagyl 500 mg twice a day p.o.  4. 4.  Consult ID  5. 5.  Consult PT OT    See orders    Electronically signed by Carol Alcantar MD on 11/19/2020 at 1:27 PM

## 2020-11-19 NOTE — PROGRESS NOTES
Dr. Oh Coins aware of +CDiff, +BC, and the need for [facility] orders along with order and documentation for hospital bed.

## 2020-11-19 NOTE — PROGRESS NOTES
Past Surgical History:   Procedure Laterality Date    APPENDECTOMY      BREAST SURGERY      cyst     CHOLECYSTECTOMY      COLONOSCOPY      ENDOSCOPY, COLON, DIAGNOSTIC      HYSTERECTOMY      JOINT REPLACEMENT      left knee    KNEE ARTHROPLASTY Right 1/15/2014    TOTAL KNEE ARTHROPLASTY    THYROIDECTOMY         Precautions: Activity as tolerated, falls, alarm and dementia ,      SUBJECTIVE:    Social history: Patient lives with spouse and dtr who states 24/7 in her house in a ranch home  with 1 step  to enter     Equipment owned: Cane, Wheeled Walker, Bedside commode, Shower chair and O2,       [address]   How much difficulty turning over in bed?: A Little  How much difficulty sitting down on / standing up from a chair with arms?: A Little  How much difficulty moving from lying on back to sitting on side of bed?: A Little  How much help from another person moving to and from a bed to a chair?: A Little  How much help from another person needed to walk in hospital room?: A Little  How much help from another person for climbing 3-5 steps with a railing?: A Lot  AM-PAC Inpatient Mobility Raw Score : 17  AM-PAC Inpatient T-Scale Score : 42.13  Mobility Inpatient CMS 0-100% Score: 50.57  Mobility Inpatient CMS G-Code Modifier : CK    Nursing cleared patient for PT treatment. pt restless with bed alarm sounding      OBJECTIVE;   Initial Evaluation  Date: 11/18/20 Treatment Date:    11/19/2020   Short Term/ Long Term   Goals   Was pt agreeable to Eval/treatment? Yes   yes To be met in 3 days   Pain level   0/10    0/10    Bed Mobility    Rolling: Supervision     Supine to sit: Supervision     Sit to supine: Supervision     Scooting: Supervision    Rolling: Supervision     Supine to sit: Supervision     Sit to supine: Supervision     Scooting: Supervision      Rolling: Independent    Supine to sit:  Independent    Sit to supine: Independent    Scooting: Independent Transfers Sit to stand: Minimal assist of 1  From chair, Bedside commode and bed for safety and redirection Sit to stand: Minimal assist of 1 with cues for safety   Stand pivot: Not assessed      Sit to stand: Independent     Ambulation    3x20 feet using  wheeled walker with Minimal assist of 1   for balance and o2 line management 2 x 12 feet using  wheeled walker with Minimal assist of 1 cues for safety due to impulsive behavior. 75 feet using  wheeled walker with Supervision     ROM Within functional limits       Strength BUE:  refer to OT eval  RLE:  4/5  LLE:  4/5   Increase strength in affected mm groups by 1/3 grade   Balance Sitting EOB:  good    Dynamic Standing:  fair   Sitting EOB:  good     Dynamic Standing:  fair      Sitting EOB:  good    Dynamic Standing: good       Patient is Alert & Oriented x person, place, time and situation and follows directions    Sensation:  Patient  denies numbness and tingling     Edema:  no   Endurance: fair       Patient education  Patient educated on role of Physical Therapy, risks of immobility, safety and plan of care      Patient response to education:   Pt verbalized understanding Pt demonstrated skill Pt requires further education in this area   Yes Partial Yes      Treatment:  Patient practiced and was instructed/facilitated in the following treatment: Patient   Sat edge of bed 5 minutes with Supervision  to increase dynamic sitting balance and activity tolerance. gait and trf training with cuing for hand placement, walker use and balance, pt stood ambulated into bathroom Pt amb again and returned to sit at side of bed and performed exercises, rtb. Therapeutic Exercises:  ankle pumps and long arc quad x 20 reps. At end of session, patient in bed with one on one sitter present  call light and phone within reach,   all lines and tubes intact, nursing notified.       Patient would benefit from continued skilled Physical Therapy to improve functional independence and quality of life. Patient's/ family goals   home        ASSESSMENT: Patient exhibits decreased strength, balance, coordination impairing functional mobility. Fatigues easily and requires frequent rests and redirection to task. Pt impulsive and requires cues for all activity. Plan of Care:     -Standing Balance: Perform strengthening exercises in standing to promote motor control with or without upper extremity support   -Transfers: Provide instruction on proper hand and foot position for adequate transfer of weight onto lower extremities and use of gait device  -Gait: Gait training and Standing activities to improve: base of support, weight shift, weight bearing    -Endurance: Utilize Supervised activities to increase level of endurance to allow for safe functional mobility including transfers and gait     Patient and or family understand(s) diagnosis, prognosis, and plan of care. Frequency of treatments: Patient will be seen daily.        Time in  1511  Time out 1534     Total Treatment Time  23 minutes    CPT codes:    Therapeutic activities (47559)   15 minutes  1 unit(s)  Gait Training (64662) 8 minutes 1 unit(s)     Erika Imus, PTA  [facility]#[id number]

## 2020-11-19 NOTE — CARE COORDINATION
Ss note:11/19/2020.10:02 AM covid test neg on 11-. Respiratory panel was completed and result negative on 11-. Resume order of care for [facility] and  notified liaison Feli Bo of orders. Dtr requesting hospital bed at discharge as Elon Claude relays will need new hospital bed order with dx AND supporting Physician documentation stating \"pt requires frequent and immediate repositioning\" . Plan is home with family and [facility]. Pt has 24.7 supervision at home, has 02 and nebulizer at home also.  BERNARDO Mc

## 2020-11-19 NOTE — CONSULTS
Infectious Disease Consult Note     Admit Date: 11/17/2020  5:06 PM    Chief complaint: nausea, vomiting, diarrhea     Reason for Consult:  Cdiff, UTI, GPC bacteremia     Requesting Physician:  Yojana Thomson MD      HISTORY OF PRESENT ILLNESS:    This is a 79year old female who presented to ER with nausea, vomiting, diarrhea, malaise, and lightheadedness about a week ago. Three weeks ago she suffered a fall and fractured T12 - it was determined there would be no surgery. CT at [facility] at that time showed left renal mass suggestive of neoplasm. Patient was recently on antibiotics and due to persistent diarrhea her PCP sent her to ER. She is confused, especially at night. Blood cultures + gram positive cocci in clusters, cdiff +, and urine culture +. ID consulted for recommendations. Seen at bedside.  Appears confused at times, but pleasant  Poor historian  On room air, no respiratory complaints   C/o diarrhea and nausea       REVIEW OF SYSTEMS:    Negative except as above     MEDICAL HISTORY  Past Medical History:   Diagnosis Date    Arthritis     joints, knees, back  djd    Cerebral artery occlusion with cerebral infarction (HCC)     Chronic back pain     COPD (chronic obstructive pulmonary disease) (HCC)     Diabetes mellitus (HCC)     GERD (gastroesophageal reflux disease)     H/O cardiovascular stress test 11/12/2018    lexiscan stress test    Hyperlipidemia     Thyroid disease       Immunization History   Administered Date(s) Administered    Influenza Virus Vaccine 01/16/2014    Pneumococcal Polysaccharide ([id number]) 01/16/2014    Tdap (Boostrix, Adacel) 03/22/2019     Past Surgical History:   Procedure Laterality Date    APPENDECTOMY      BREAST SURGERY      cyst     CHOLECYSTECTOMY      COLONOSCOPY      ENDOSCOPY, COLON, DIAGNOSTIC      HYSTERECTOMY      JOINT REPLACEMENT      left knee    KNEE ARTHROPLASTY Right 1/15/2014    TOTAL KNEE ARTHROPLASTY    THYROIDECTOMY       FAMILY HISTORY  family history is not on file.   SOCIAL HISTORY  Social History     Socioeconomic History    Marital status:      Spouse name: Not on file    Number of children: Not on file    Years of education: Not on file    Highest education level: Not on file   Occupational History    Not on file   Social Needs    Financial resource strain: Not on file    Food insecurity     Worry: Not on file     Inability: Not on file    Transportation needs     Medical: Not on file     Non-medical: Not on file   Tobacco Use    Smoking status: Former Smoker     Packs/day: 1.00     Years: 60.00     Pack years: 60.00     Last attempt to quit: 10/18/2020     Years since quittin.0    Smokeless tobacco: Never Used   Substance and Sexual Activity    Alcohol use: No    Drug use: No    Sexual activity: Not Currently   Lifestyle    Physical activity     Days per week: Not on file     Minutes per session: Not on file    Stress: Not on file   Relationships    Social connections     Talks on phone: Not on file     Gets together: Not on file     Attends Mosque service: Not on file     Active member of club or organization: Not on file     Attends meetings of clubs or organizations: Not on file     Relationship status: Not on file    Intimate partner violence     Fear of current or ex partner: Not on file     Emotionally abused: Not on file     Physically abused: Not on file     Forced sexual activity: Not on file   Other Topics Concern    Not on file   Social History Narrative    Not on file         Current Medications:     Scheduled Meds:   vancomycin  1,000 mg Intravenous Q24H    vancomycin  250 mg Oral 4 times per day    lactobacillus acidophilus  4 tablet Oral TID    amLODIPine  5 mg Oral Daily    DULoxetine  40 mg Oral Nightly    glimepiride  2 mg Oral QAM AC    losartan  50 mg Oral Nightly    metoprolol succinate  25 mg Oral Daily    mirabegron  25 mg Oral Nightly    montelukast  10 mg Oral Nightly  rosuvastatin  20 mg Oral Nightly    ipratropium-albuterol  3 mL Inhalation 4x daily    levothyroxine  137 mcg Oral Daily    warfarin  7.5 mg Oral QPM    clotrimazole-betamethasone   Topical BID    Arformoterol Tartrate  15 mcg Nebulization BID    And    budesonide  0.5 mg Nebulization BID    cefepime  1 g Intravenous Q12H     Continuous Infusions:   sodium chloride 25 mL (11/19/20 0323)    dextrose      sodium chloride 60 mL/hr at 11/19/20 1016     PRN Meds:acetaminophen, ondansetron, glucose, dextrose, glucagon (rDNA), dextrose, haloperidol lactate      PHYSICAL EXAM:  Vitals:    11/18/20 0809 11/18/20 1600 11/18/20 2058 11/19/20 0800   BP: 127/67 (!) 172/77 (!) 149/72 (!) 149/73   Pulse: 87 104 114 104   Resp: 18 18 18 18   Temp: 97.1 °F (36.2 °C) 98.6 °F (37 °C) 98.1 °F (36.7 °C) 98.9 °F (37.2 °C)   TempSrc: Axillary Oral Oral Oral   SpO2: 98% 96% 97% 93%   Weight:       Height:           General Appearance:       Alert, cooperative, CONFUSED AT TIMES, no distress, appears stated age        Heent:    Normocephalic, atraumatic,     PERRL, conjunctiva/corneas clear     no drainage or sinus tenderness      Neck:   Supple, symmetrical, trachea midline   Back:     Symmetric, no CVA tenderness   Lungs:     DIM to auscultation bilaterally, respirations unlabored +rhonchi   Chest Wall:    No tenderness or deformity    Heart:    Regular rate and rhythm, S1 and S2 normal, no murmur, rub or   gallop   Abdomen:     Soft, non-tender, bowel sounds active all four quadrants         Extremities:   Extremities normal, atraumatic, no cyanosis or edema   Pulses:   LE extremities   Skin:   Skin color, texture, turgor normal, no rashes or lesions   Lymph nodes:   Cervical, supraclavicular, and axillary nodes normal   Neurologic:   CNII-XII intact, no focal deficits    piv        LABS AND DATA REVIEW:     Recent Labs     11/17/20  1725 11/18/20  0659 11/19/20  0616   WBC 11.7* 16.5* 10.9   HGB 10.9* 11.9 11.7   HCT 35.7 patient. The patient was educated about the diagnosis, prognosis, indications, risks and benefits of treatment. Pt had the opportunity to ask questions. All questions were answered. Has arlette  Has vre septicemia with cdiff  Recent d/c from [facility]  -f/u blood cx  -linezolid/cefepime/vanco po   -will need Mack  covid screen neg   Urine cx with E coli  R/o hcap     vancomycin (VANCOCIN) oral solution 250 mg, 4 times per day  lactobacillus (CULTURELLE) capsule 1 capsule, Daily  linezolid (ZYVOX) tablet 600 mg, 2 times per day  cefepime (MAXIPIME) 1 g IVPB extended (mini-bag), Q12H         Thank you for involving me in the care of Loi Burton. Please do not hesitate to call for any questions or concerns.     Electronically signed by Shamika Mcgregor MD on 11/19/2020 at 6:05 PM    Phone [phone]  Fax [phone]

## 2020-11-20 NOTE — PROGRESS NOTES
[address] [place] [address] [facility]  ATIYAIDA  Progress Note    Face to face encounter  CC:confused- has sitter   SUBJECTIVE:  Patient is tolerating medications. No reported adverse drug reactions. ROS: No nausea, vomiting, diarrhea. Has been afebrile. Has sitter in room incontinent of urine this am  No further complaints. Medications:  Scheduled Meds:   vancomycin  250 mg Oral 4 times per day    lactobacillus  1 capsule Oral Daily    linezolid  600 mg Oral 2 times per day    amLODIPine  5 mg Oral Daily    DULoxetine  40 mg Oral Nightly    glimepiride  2 mg Oral QAM AC    losartan  50 mg Oral Nightly    metoprolol succinate  25 mg Oral Daily    mirabegron  25 mg Oral Nightly    montelukast  10 mg Oral Nightly    rosuvastatin  20 mg Oral Nightly    ipratropium-albuterol  3 mL Inhalation 4x daily    levothyroxine  137 mcg Oral Daily    warfarin  7.5 mg Oral QPM    clotrimazole-betamethasone   Topical BID    Arformoterol Tartrate  15 mcg Nebulization BID    And    budesonide  0.5 mg Nebulization BID    cefepime  1 g Intravenous Q12H     Continuous Infusions:   sodium chloride 25 mL (11/19/20 1414)    dextrose      sodium chloride 60 mL/hr at 11/19/20 1016     PRN Meds:acetaminophen, ondansetron, glucose, dextrose, glucagon (rDNA), dextrose, haloperidol lactate  OBJECTIVE:  Patient Vitals for the past 24 hrs:   BP Temp Temp src Pulse Resp SpO2   11/20/20 0820 (!) 126/59 98 °F (36.7 °C) Oral 87 18 92 %   11/20/20 0349 -- -- -- 106 -- --   11/19/20 1945 138/72 98.1 °F (36.7 °C) Oral 105 19 95 %     Constitutional: The patient is awake, alert, and oriented. Skin: Warm and dry. No rashes were noted. Head: Eyes show round, and reactive pupils. No jaundice. Mouth: Moist mucous membranes, no ulcerations, no thrush. Neck: Supple to movements. No lymphadenopathy. Chest: No use of accessory muscles to breathe. Symmetrical expansion.  Auscultation reveals no wheezing, crackles, or rhonchi. Cardiovascular: S1 and S2 are rhythmic and regular. No murmurs appreciated. Abdomen: Positive bowel sounds to auscultation. Benign to palpation. No masses felt. No hepatosplenomegaly. Extremities: No clubbing, no cyanosis, no edema. Laboratory and Tests Review:  Lab Results   Component Value Date    WBC 9.6 11/20/2020    WBC 10.9 11/19/2020    WBC 16.5 (H) 11/18/2020    HGB 10.9 (L) 11/20/2020    HCT 34.4 11/20/2020    MCV 85.8 11/20/2020     11/20/2020     Lab Results   Component Value Date    NEUTROABS 5.89 11/20/2020    NEUTROABS 8.36 (H) 11/17/2020    NEUTROABS 9.65 (H) 10/25/2020     Lab Results   Component Value Date    CRP 14.9 (H) 12/29/2019    CRP 1.8 (H) 12/27/2019     Lab Results   Component Value Date    SEDRATE 86 (H) 12/29/2019    SEDRATE 84 (H) 12/29/2019    SEDRATE 86 (H) 12/27/2019     Lab Results   Component Value Date    ALT 9 11/20/2020    AST 19 11/20/2020    ALKPHOS 122 (H) 11/20/2020    BILITOT 0.2 11/20/2020     Lab Results   Component Value Date     11/20/2020    K 3.3 11/20/2020    K 4.4 10/06/2020     11/20/2020    CO2 19 11/20/2020    BUN 14 11/20/2020    CREATININE 0.7 11/20/2020    GFRAA >60 11/20/2020    LABGLOM >60 11/20/2020    GLUCOSE 71 11/20/2020    GLUCOSE 158 10/03/2011    PROT 6.1 11/20/2020    LABALBU 2.6 11/20/2020    LABALBU 4.0 10/03/2011    CALCIUM 8.2 11/20/2020    BILITOT 0.2 11/20/2020    ALKPHOS 122 11/20/2020    AST 19 11/20/2020    ALT 9 11/20/2020     Radiology:  Reviewed      Microbiology:   11/17/2020- blood cx- enterococcus species  C.diff- ++ detected  Urine cx- E.coli     ASSESSMENT:  · Enterococcus bacteremia-- ?  Source   · C.diff- recurrent   · History of falls- with c-spine fracture   · Bacteruria with E.coli     PLAN:  · Continue cefepime for now  · Continue zyvox     · Continue PO vancomycin   · Will need CARLOS  · Repeat blood cultures   · Check cultures  · May need CT A/P   · Monitor labs    Clyde Holt CNS- BC  9:57 AM  11/20/2020     As above    This is a face to face encounter with Milas Saint on 11/20/20. I have performed and participated in the history, exam, medical decision making, and  POC  with the NURSE PRACTITIONER and provided the instruction and education regarding this patient's care. Imaging and labs were reviewed per medical records and any ID pertinent labs were addressed with the patient. The patient was educated about the diagnosis, prognosis, indications, risks and benefits of treatment. Pt had the opportunity to ask questions. All questions were answered. prob VRE Enterococcus septicemia  Cont atbx  Check blood/labs  Mack  cdiff on rx   Thank you for involving me in the care of Milas Saint. Please do not hesitate to call for any questions or concerns.     Electronically signed by Nila Mena MD on 11/20/2020 at 5:57 PM    Phone [phone]  Fax [phone]

## 2020-11-20 NOTE — PROGRESS NOTES
Department of Internal Medicine  General Internal Medicine  Attending Progress Note      Subjective: The patient is awake and alert, pleasantly confused. No problems overnight. Denies chest pain, angina, and dyspnea. Denies abdominal pain. Tolerating diet. No nausea or vomiting. Feeling better today still has diarrhea    Objective:  Pt oriented, alert, coherent and logical    BP (!) 126/59   Pulse 87   Temp 98 °F (36.7 °C) (Oral)   Resp 18   Ht 5' 5\" (1.651 m)   Wt 132 lb 11.2 oz (60.2 kg)   SpO2 92%   BMI 22.08 kg/m²     Head and Neck: normal atraumatic, no neck masses, normal thyroid, no jvd    Heart:  regular rate and rhythm, S1, S2 normal, no murmur, click, rub or gallop    Lungs:  chest bibasilar inspiratory rales scattered rhonchi bilaterally no wheezes resonant to percussion  Abd: soft, non-tender, without masses or organomegaly    Extrem:  No clubbing, cyanosis, or edema    Neuro: no focal neurological deficit     Skin: No rashes, lesions, or ecchymosis, no ulcers. Psych: No apparent anxiety, agitation, or depression.      Labs:   CBC:   Lab Results   Component Value Date    WBC 9.6 11/20/2020    RBC 4.01 11/20/2020    HGB 10.9 11/20/2020    HCT 34.4 11/20/2020    MCV 85.8 11/20/2020    MCH 27.2 11/20/2020    MCHC 31.7 11/20/2020    RDW 14.6 11/20/2020     11/20/2020    MPV 10.5 11/20/2020     CMP:    Lab Results   Component Value Date     11/20/2020    K 3.3 11/20/2020    K 4.4 10/06/2020     11/20/2020    CO2 19 11/20/2020    BUN 14 11/20/2020    CREATININE 0.7 11/20/2020    GFRAA >60 11/20/2020    LABGLOM >60 11/20/2020    GLUCOSE 71 11/20/2020    GLUCOSE 158 10/03/2011    PROT 6.1 11/20/2020    LABALBU 2.6 11/20/2020    LABALBU 4.0 10/03/2011    CALCIUM 8.2 11/20/2020    BILITOT 0.2 11/20/2020    ALKPHOS 122 11/20/2020    AST 19 11/20/2020    ALT 9 11/20/2020     PT/INR:    Lab Results   Component Value Date    PROTIME 29.3 11/20/2020    INR 2.6 11/20/2020

## 2020-11-20 NOTE — PROGRESS NOTES
Physical Therapy    Physical Therapy Treatment Note    Room #:  9267/0285-77  Patient Name: Shree Guajardo  YOB: 1940  MRN: [id number]    Referring Provider: Sher Clinton MD     Date of Service: 11/20/2020    Evaluating Physical Therapist: Christian Hale, PT  #[id number]       Diagnosis: Pneumonia [J18.9] Admitted with nausea, vomiting, diarrhea        Patient Active Problem List   Diagnosis    Breast mass, right    DJD (degenerative joint disease) of knee    Knee pain    Iron (Fe) deficiency anemia    Diabetes mellitus type II, controlled ([address]    Chronic obstructive pulmonary disease (HCC)    Atelectasis    Hyponatremia    S/P total knee arthroplasty    Status post total bilateral knee replacement    Localized osteoarthrosis, lower leg    Failed total left knee replacement (HCC)    Altered mental status    Generalized weakness    Weakness of left lower extremity    Closed nondisplaced fracture of acromial end of right clavicle    Intertrochanteric fracture of right hip, closed, initial encounter ([address]    Essential hypertension    Mixed hyperlipidemia    Bronchospasm    Tachycardia    Traumatic arthritis of wrist    Sepsis secondary to cat scratch ([address]    Pneumonia    C. difficile colitis    Acute urinary tract infection      Tentative placement recommendation: Home Health Physical Therapy with 24/7 assist    Equipment recommendation: None      Prior Level of Function: Patient ambulated with supervision     Rehab Potential: good for baseline    Past medical history:   Past Medical History:   Diagnosis Date    Arthritis     joints, knees, back  djd    Cerebral artery occlusion with cerebral infarction ([address]     Chronic back pain     COPD (chronic obstructive pulmonary disease) ([address]     Diabetes mellitus ([address]     GERD (gastroesophageal reflux disease)     H/O cardiovascular stress test 11/12/2018    lexiscan stress test    Hyperlipidemia     Thyroid disease      Past Surgical History:   Procedure Laterality Date    APPENDECTOMY      BREAST SURGERY      cyst     CHOLECYSTECTOMY      COLONOSCOPY      ENDOSCOPY, COLON, DIAGNOSTIC      HYSTERECTOMY      JOINT REPLACEMENT      left knee    KNEE ARTHROPLASTY Right 1/15/2014    TOTAL KNEE ARTHROPLASTY    THYROIDECTOMY       Precautions: Activity as tolerated, falls, alarm and dementia ,      SUBJECTIVE:  Social history: Patient lives with spouse and dtr who states 24/7 in her house in a ranch home with 1 step  to enter   Equipment owned: Cane, Wheeled Walker, Bedside commode, Shower chair and O2,       [address]   How much difficulty turning over in bed?: A Little  How much difficulty sitting down on / standing up from a chair with arms?: A Little  How much difficulty moving from lying on back to sitting on side of bed?: A Little  How much help from another person moving to and from a bed to a chair?: A Little  How much help from another person needed to walk in hospital room?: A Little  How much help from another person for climbing 3-5 steps with a railing?: A Lot  AM-PAC Inpatient Mobility Raw Score : 17  AM-PAC Inpatient T-Scale Score : 42.13  Mobility Inpatient CMS 0-100% Score: 50.57  Mobility Inpatient CMS G-Code Modifier : CK    Nursing cleared patient for PT treatment. OBJECTIVE;   Initial Evaluation  Date: 11/18/20 Treatment Date:    11/20/2020   Short Term/ Long Term   Goals   Was pt agreeable to Eval/treatment? Yes   Yes To be met in 3 days   Pain level   0/10    0/10    Bed Mobility    Rolling: Supervision     Supine to sit: Supervision     Sit to supine: Supervision     Scooting: Supervision    Rolling: Not assessed     Supine to sit: Supervision     Sit to supine: Supervision     Scooting: Supervision      Rolling: Independent    Supine to sit:  Independent    Sit to supine: Independent    Scooting: Independent     Transfers Sit to stand: Minimal assist of 1  From Therapeutic Exercises: Patient performed AROM/AAROM ankle pumps, heel slide, hip abduction/adduction and straight leg raise x 10 reps. Verbal cues were given for correct technique and to perform 2-3x daily. At end of session, patient in bed with alarm, call light and phone within reach, all lines and tubes intact, nursing notified. Patient would benefit from continued skilled Physical Therapy to improve functional independence and quality of life. Patient's/ family goals   home        ASSESSMENT: Patient continues to exhibit decreased strength, balance, and coordination that is impairing functional mobility. Patient also continues to fatigue easily and requires frequent rests and redirection to task. Pt impulsive and requires cues for all activity for safety and proper technique's/form. Plan of Care:   -Standing Balance: Perform strengthening exercises in standing to promote motor control with or without upper extremity support   -Transfers: Provide instruction on proper hand and foot position for adequate transfer of weight onto lower extremities and use of gait device  -Gait: Gait training and Standing activities to improve: base of support, weight shift, weight bearing    -Endurance: Utilize Supervised activities to increase level of endurance to allow for safe functional mobility including transfers and gait     Patient and or family understand(s) diagnosis, prognosis, and plan of care. Frequency of treatments: Patient will be seen daily.        Time in: 11:45  Time out: 11:09    Total Treatment Time: 24 minutes    CPT codes:  Therapeutic exercises (78142)   12 minutes  1 unit(s)  Gait Training (01057) 12 minutes 1 unit(s)     Alwin Cogan, PTA   LIC# [id number]

## 2020-11-20 NOTE — CARE COORDINATION
11/20/2020 1338 CM note: NEGATIVE COVID TESTING 11/17/20 and 11/18/20 (per resp panel). Pt in isolation for C DIFF. Pt is 1:1,resides at home with family and has 24/7 supervision. Family denies SNF placement, stating pt does better in home setting. Pt has home o2 2l NC through [facility] and nebulizer. Pt is active with [facility]  and [facility] orders received. Spoke with Dr Cathryn Carpenter regarding family request for hospital bed. PT notes reviewed with . PT notes stating pt ambulating well and  does not feel hospital is appropriate as he wants pt to continue to ambulate. Plan is home with family and [facility]. Pt has 24/7 supervision at home. CM/SW will follow for antibiotic needs-possible CARLOS.  Apryl CHUN

## 2020-11-21 NOTE — CONSULTS
APRN - NP, 600 mg at 11/21/20 0917    acetaminophen (TYLENOL) tablet 500 mg, 500 mg, Oral, Q6H PRN, Cassandra Friend MD, 500 mg at 11/19/20 2056    amLODIPine (NORVASC) tablet 5 mg, 5 mg, Oral, Daily, Cassandra Friend MD, 5 mg at 11/21/20 0917    DULoxetine (CYMBALTA) extended release capsule 40 mg, 40 mg, Oral, Nightly, Cassandra Friend MD, 40 mg at 11/20/20 2207    glimepiride (AMARYL) tablet 2 mg, 2 mg, Oral, QAM AC, Cassandra Friend MD, 2 mg at 11/21/20 0647    losartan (COZAAR) tablet 50 mg, 50 mg, Oral, Nightly, Cassandra Friend MD, 50 mg at 11/20/20 2207    metoprolol succinate (TOPROL XL) extended release tablet 25 mg, 25 mg, Oral, Daily, Cassandra Friend MD, 25 mg at 11/21/20 0917    mirabegron (MYRBETRIQ) extended release tablet 25 mg, 25 mg, Oral, Nightly, Cassandra Friend MD    montelukast (SINGULAIR) tablet 10 mg, 10 mg, Oral, Nightly, Cassandra Friend MD, 10 mg at 11/20/20 2208    ondansetron (ZOFRAN-ODT) disintegrating tablet 4 mg, 4 mg, Oral, Q8H PRN, Cassandra Friend MD    ipratropium-albuterol (DUONEB) nebulizer solution 3 mL, 3 mL, Inhalation, 4x daily, Cassandra Friend MD, 3 mL at 11/21/20 1011    levothyroxine (SYNTHROID) tablet 137 mcg, 137 mcg, Oral, Daily, Cassandra Friend MD, 137 mcg at 11/21/20 0651    warfarin (COUMADIN) tablet 7.5 mg, 7.5 mg, Oral, QPM, Cassandra Friend MD, 7.5 mg at 11/20/20 1908    clotrimazole-betamethasone (LOTRISONE) cream, , Topical, BID, Cassandra Friend MD    Arformoterol Tartrate (BROVANA) nebulizer solution 15 mcg, 15 mcg, Nebulization, BID, 15 mcg at 11/21/20 0605 **AND** budesonide (PULMICORT) nebulizer suspension 500 mcg, 0.5 mg, Nebulization, BID, Cassandra Friend MD, 500 mcg at 11/21/20 0605    glucose (GLUTOSE) 40 % oral gel 15 g, 15 g, Oral, PRN, Cassandra Friend MD    dextrose 50 % IV solution, 12.5 g, Intravenous, PRN, Cassandra Friend MD    glucagon (rDNA) injection 1 mg, 1 mg, Intramuscular, PRN, Cassandra Friend MD    dextrose 5 % solution, 100 mL/hr, Intravenous, PRN, Cassandra Friend MD    0.9 % sodium chloride infusion, , Intravenous, Continuous, Cassandra Friend MD, Last Rate: 60 mL/hr at 20 2211    haloperidol lactate (HALDOL) injection 2 mg, 2 mg, Intramuscular, Q8H PRN, Csasandra Friend MD, 2 mg at 20 0033    Allergies as of 2020 - Review Complete 2020   Allergen Reaction Noted    Latex Itching and Rash 01/10/2014    Codeine Anxiety 2014    Morphine Anxiety 2014       Social History     Socioeconomic History    Marital status:      Spouse name: Not on file    Number of children: Not on file    Years of education: Not on file    Highest education level: Not on file   Occupational History    Not on file   Social Needs    Financial resource strain: Not on file    Food insecurity     Worry: Not on file     Inability: Not on file   [employer] needs     Medical: Not on file     Non-medical: Not on file   Tobacco Use    Smoking status: Former Smoker     Packs/day: 1.00     Years: 60.00     Pack years: 60.00     Last attempt to quit: 10/18/2020     Years since quittin.0    Smokeless tobacco: Never Used   Substance and Sexual Activity    Alcohol use: No    Drug use: No    Sexual activity: Not Currently   Lifestyle    Physical activity     Days per week: Not on file     Minutes per session: Not on file    Stress: Not on file   Relationships    Social connections     Talks on phone: Not on file     Gets together: Not on file     Attends Congregation service: Not on file     Active member of club or organization: Not on file     Attends meetings of clubs or organizations: Not on file     Relationship status: Not on file    Intimate partner violence     Fear of current or ex partner: Not on file     Emotionally abused: Not on file     Physically abused: Not on file     Forced sexual activity: Not on file   Other Topics Concern    Not on file   Social History Narrative    Not on file History reviewed. No pertinent family history. REVIEW OF SYSTEMS:     CONSTITUTIONAL:  negative for  fevers, chills, sweats and fatigue  HEENT:  negative for  tinnitus, earaches, nasal congestion and epistaxis  RESPIRATORY:  negative for  dry cough, cough with sputum, dyspnea, wheezing and hemoptysis  GASTROINTESTINAL:  negative for nausea, vomiting, diarrhea, constipation, pruritus and jaundice  GENITOURINARY:  negative for frequency, dysuria, nocturia, urinary incontinence and hesitancy  HEMATOLOGIC/LYMPHATIC:  negative for easy bruising, bleeding, lymphadenopathy and petechiae  ALLERGIC/IMMUNOLOGIC:  negative for urticaria, hay fever and angioedema  ENDOCRINE:  negative for heat intolerance, cold intolerance, tremor, hair loss and diabetic symptoms including neither polyuria nor polydipsia nor blurred vision  MUSCULOSKELETAL:  negative for  myalgias, arthralgias, joint swelling, stiff joints and decreased range of motion  NEUROLOGICAL:  negative for visual disturbance, dysphagia, weakness and numbness      PHYSICAL EXAM:   CONSTITUTIONAL:  awake, alert, cooperative, no apparent distress, and appears stated age  EYES:  lids and lashes normal and pupils equal, round and reactive to light, anicteric sclerae  HEAD:  normocepalic, without obvious abnormality, atraumatic, pink, moist mucous membranes.   NECK:  Supple, symmetrical, trachea midline, no adenopathy, thyroid symmetric, not enlarged and no tenderness, skin normal  HEMATOLOGIC/LYMPHATICS:  no cervical lymphadenopathy and no supraclavicular lymphadenopathy  LUNGS:  No increased work of breathing,  No accessory muscle use or intercostal retractions, good air exchange, clear to auscultation bilaterally, no crackles or wheezing  CARDIOVASCULAR:  Normal apical impulse, regular rate and rhythm, normal S1 and S2, no S3 or S4, 2/6 systolic murmur at the left lower sternal border, no JVD, no carotid bruit, no pedal edema, good carotid upstroke bilaterally. ABDOMEN:  Soft, nontender, no masses, no hepatomegaly or splenomegaly, BS+  CHEST: nontender to palpation, expands symmetrically  MUSCULOSKELETAL:  No clubbing no cyanosis. there is no redness, warmth, or swelling of the joints  full range of motion noted  NEUROLOGIC:  Alert, awake  SKIN:  no bruising or bleeding, normal skin color, texture, turgor and no redness, warmth, or swelling        /66   Pulse 94   Temp 97.5 °F (36.4 °C) (Axillary)   Resp 18   Ht 5' 5\" (1.651 m)   Wt 132 lb 11.2 oz (60.2 kg)   SpO2 96%   BMI 22.08 kg/m²     DATA:   I personally reviewed the admission EKG with the following interpretation: Sinus tachycardia    ECHO: 11/12/2018,Summary   Mild left ventricular concentric hypertrophy noted. ef 57% by 2D   Mild tricuspid regurgitation. Right ventricular systolic pressure of 79.8 mm Hg consistent with mild   pulmonary hypertension.        Stress Test: Not performed to date  Angiography: Not performed to date  Cardiology Labs:   BMP:    Lab Results   Component Value Date     11/21/2020    K 3.8 11/21/2020    K 4.4 10/06/2020     11/21/2020    CO2 20 11/21/2020    BUN 12 11/21/2020     CMP:    Lab Results   Component Value Date     11/21/2020    K 3.8 11/21/2020    K 4.4 10/06/2020     11/21/2020    CO2 20 11/21/2020    BUN 12 11/21/2020    PROT 6.0 11/21/2020     CBC:    Lab Results   Component Value Date    WBC 8.9 11/21/2020    RBC 3.89 11/21/2020    HGB 10.5 11/21/2020    HCT 33.2 11/21/2020    MCV 85.3 11/21/2020    RDW 14.6 11/21/2020     11/21/2020     PT/INR:  No results found for: PTINR  PT/INR Warfarin:  No components found for: PTPATWAR, PTINRWAR  PTT:    Lab Results   Component Value Date    APTT 34.7 12/10/2017     PTT Heparin:  No components found for: APTTHEP  Magnesium:    Lab Results   Component Value Date    MG 0.4 12/30/2019     TSH:    Lab Results   Component Value Date    TSH 1.180 05/25/2020     TROPONIN:  No components found for: TROP  BNP:  No results found for: BNP  FASTING LIPID PANEL:    Lab Results   Component Value Date    CHOL 122 11/12/2018    HDL 63 11/12/2018    TRIG 110 11/12/2018     XR CHEST (2 VW)   Final Result   Hyperinflation. Coarsened bibasilar interstitial opacities with new right   upper lobe airspace disease. Findings could represent interstitial alveolar   edema versus developing pneumonia. Follow-up to assure resolution following medical treatment course recommended. I have personally reviewed the laboratory, cardiac diagnostic and radiographic testing as outlined above:      IMPRESSION:  1.  Gram-positive cocci sepsis: CARLOS requested to rule out endocarditis  2. Hypertension: Controlled  3. Type 2 diabetes mellitus  4. History of CVA  5. Chronic anticoagulation    RECOMMENDATIONS:   1. Disoriented, therapy consented for procedure today, will reevaluate tomorrow  2. Continue current treatment  3. Further cardiac recommendations will be forthcoming pending her clinical course and diagnostic test findings    No family at bedside  Discussed with nursing staff    Thank you for the consult  Electronically signed by Chilo Walden MD on 11/21/2020 at 1:04 PM  NOTE: This report was transcribed using voice recognition software.  Every effort was made to ensure accuracy; however, inadvertent computerized transcription errors may be present

## 2020-11-21 NOTE — PROGRESS NOTES
Extremities: No clubbing, no cyanosis, no edema. piv    Wound Care Documentation:  Wound 11/18/20 Back center (Active)   Dressing Status Clean;Dry; Intact 11/21/20 0820   Dressing/Treatment Foam 11/21/20 0820   Wound Length (cm) 1.7 cm 11/18/20 0140   Wound Width (cm) 1 cm 11/18/20 0140   Wound Surface Area (cm^2) 1.7 cm^2 11/18/20 0140   Drainage Amount None 11/19/20 0800   Number of days: 3       Laboratory and Tests Review:  Lab Results   Component Value Date    WBC 8.9 11/21/2020    WBC 9.6 11/20/2020    WBC 10.9 11/19/2020    HGB 10.5 (L) 11/21/2020    HCT 33.2 (L) 11/21/2020    MCV 85.3 11/21/2020     11/21/2020     Lab Results   Component Value Date    NEUTROABS 5.10 11/21/2020    NEUTROABS 5.89 11/20/2020    NEUTROABS 8.36 (H) 11/17/2020     Lab Results   Component Value Date    CRP 14.9 (H) 12/29/2019    CRP 1.8 (H) 12/27/2019     Lab Results   Component Value Date    SEDRATE 86 (H) 12/29/2019    SEDRATE 84 (H) 12/29/2019    SEDRATE 86 (H) 12/27/2019     Lab Results   Component Value Date    ALT 8 11/21/2020    AST 15 11/21/2020    ALKPHOS 118 (H) 11/21/2020    BILITOT <0.2 11/21/2020     Lab Results   Component Value Date     11/21/2020    K 3.8 11/21/2020    K 4.4 10/06/2020     11/21/2020    CO2 20 11/21/2020    BUN 12 11/21/2020    CREATININE 0.7 11/21/2020    GFRAA >60 11/21/2020    LABGLOM >60 11/21/2020    GLUCOSE 92 11/21/2020    GLUCOSE 158 10/03/2011    PROT 6.0 11/21/2020    LABALBU 2.6 11/21/2020    LABALBU 4.0 10/03/2011    CALCIUM 8.1 11/21/2020    BILITOT <0.2 11/21/2020    ALKPHOS 118 11/21/2020    AST 15 11/21/2020    ALT 8 11/21/2020     Radiology:  Reviewed      Microbiology:   11/17/2020- blood cx- enterococcus species  C.diff- ++ detected  Urine cx- E.coli     ASSESSMENT:  · Enterococcus bacteremia-- ?  Source   · C.diff- recurrent   · History of falls- with c-spine fracture   · Bacteruria with E.coli     PLAN:  · Stop  cefepime   · Continue zyvox     · Continue PO

## 2020-11-22 NOTE — PROGRESS NOTES
Department of Internal Medicine  General Internal Medicine  Attending Progress Note      Subjective: The patient is awake and alert, pleasantly confused no problems overnight. Denies chest pain, angina, and dyspnea. Denies abdominal pain. Tolerating diet. No nausea or vomiting. Patient was hypoglycemic yesterday, glimepiride was discontinued  Diarrhea stopped    Objective:  Pt oriented, alert, coherent and logical    BP (!) 163/77   Pulse 97   Temp 98.1 °F (36.7 °C) (Oral)   Resp 18   Ht 5' 5\" (1.651 m)   Wt 132 lb 11.2 oz (60.2 kg)   SpO2 93%   BMI 22.08 kg/m²     Head and Neck: normal atraumatic, no neck masses, normal thyroid, no jvd    Heart:  regular rate and rhythm, S1, S2 normal, no murmur, click, rub or gallop    Lungs:  chest decreased air exchange bilaterally bibasilar inspiratory rales resonant to percussion  Abd: soft, non-tender, without masses or organomegaly    Extrem:  No clubbing, cyanosis, or edema    Neuro: no focal neurological deficit     Skin: No rashes, lesions, or ecchymosis, no ulcers. Psych: No apparent anxiety, agitation, or depression.      Labs:   CBC:   Lab Results   Component Value Date    WBC 11.3 11/22/2020    RBC 3.69 11/22/2020    HGB 10.2 11/22/2020    HCT 31.0 11/22/2020    MCV 84.0 11/22/2020    MCH 27.6 11/22/2020    MCHC 32.9 11/22/2020    RDW 14.7 11/22/2020     11/22/2020    MPV 10.0 11/22/2020     CMP:    Lab Results   Component Value Date     11/22/2020    K 3.8 11/22/2020    K 4.4 10/06/2020     11/22/2020    CO2 21 11/22/2020    BUN 12 11/22/2020    CREATININE 0.8 11/22/2020    GFRAA >60 11/22/2020    LABGLOM >60 11/22/2020    GLUCOSE 53 11/22/2020    GLUCOSE 158 10/03/2011    PROT 5.8 11/22/2020    LABALBU 2.5 11/22/2020    LABALBU 4.0 10/03/2011    CALCIUM 7.9 11/22/2020    BILITOT <0.2 11/22/2020    ALKPHOS 111 11/22/2020    AST 18 11/22/2020    ALT 9 11/22/2020     PT/INR:    Lab Results   Component Value Date    PROTIME 55.4 11/22/2020    INR 4.8 11/22/2020            Assessment:  Enterobacter septicemia  Pneumonia  Pseudomembranous colitis responding to treatment  UTI  Hypoglycemia  Normochromic normocytic anemia    Principal Problem:    Pneumonia  Active Problems:    Diabetes mellitus type II, controlled ([address]    Chronic obstructive pulmonary disease (HCC)    Altered mental status    Essential hypertension    Mixed hyperlipidemia    C. difficile colitis    Acute urinary tract infection  Resolved Problems:    * No resolved hospital problems.  *      Plan:  Continue IV antibiotics and oral vancomycin  CARLOS tomorrow  Hold Coumadin and give vitamin K today  Discontinue IV fluids keep Hep-Lock  Discontinue glimepiride due to hypoglycemia  Check iron level TIBC and reticulocyte count   Follow-up  chest x-ray tomorrow  See orders    Electronically signed by Yojana Thomson MD on 11/22/2020 at 4:11 PM

## 2020-11-22 NOTE — PROGRESS NOTES
Physical Therapy    Physical Therapy Treatment Note    Room #:  5767/2131-27  Patient Name: Loi Burton  YOB: 1940  MRN: [id number]    Referring Provider: Travon Geller MD     Date of Service: 11/22/2020    Evaluating Physical Therapist: Rose Meyers, PT  #[id number]       Diagnosis: Pneumonia [J18.9] Admitted with nausea, vomiting, diarrhea        Patient Active Problem List   Diagnosis    Breast mass, right    DJD (degenerative joint disease) of knee    Knee pain    Iron (Fe) deficiency anemia    Diabetes mellitus type II, controlled ([address]    Chronic obstructive pulmonary disease (HCC)    Atelectasis    Hyponatremia    S/P total knee arthroplasty    Status post total bilateral knee replacement    Localized osteoarthrosis, lower leg    Failed total left knee replacement (HCC)    Altered mental status    Generalized weakness    Weakness of left lower extremity    Closed nondisplaced fracture of acromial end of right clavicle    Intertrochanteric fracture of right hip, closed, initial encounter ([address]    Essential hypertension    Mixed hyperlipidemia    Bronchospasm    Tachycardia    Traumatic arthritis of wrist    Sepsis secondary to cat scratch ([address]    Pneumonia    C. difficile colitis    Acute urinary tract infection      Tentative placement recommendation: Subacute vs Home Health Physical Therapy if patient meets goals    Equipment recommendation: None      Prior Level of Function: Patient ambulated with supervision     Rehab Potential: good for baseline    Past medical history:   Past Medical History:   Diagnosis Date    Arthritis     joints, knees, back  djd    Cerebral artery occlusion with cerebral infarction ([address]     Chronic back pain     COPD (chronic obstructive pulmonary disease) ([address]     Diabetes mellitus ([address]     GERD (gastroesophageal reflux disease)     H/O cardiovascular stress test 11/12/2018    lexiscan stress test    Hyperlipidemia     Thyroid disease      Past Surgical History:   Procedure Laterality Date    APPENDECTOMY      BREAST SURGERY      cyst     CHOLECYSTECTOMY      COLONOSCOPY      ENDOSCOPY, COLON, DIAGNOSTIC      HYSTERECTOMY      JOINT REPLACEMENT      left knee    KNEE ARTHROPLASTY Right 1/15/2014    TOTAL KNEE ARTHROPLASTY    THYROIDECTOMY       Precautions: Activity as tolerated, falls, alarm and dementia ,      SUBJECTIVE:  Social history: Patient lives with spouse and dtr who states 24/7 in her house in a ranch home with 1 step  to enter   Equipment owned: Cane, Wheeled Walker, Bedside commode, Shower chair and O2,       [address]   How much difficulty turning over in bed?: A Little  How much difficulty sitting down on / standing up from a chair with arms?: A Lot  How much difficulty moving from lying on back to sitting on side of bed?: A Lot  How much help from another person moving to and from a bed to a chair?: A Little  How much help from another person needed to walk in hospital room?: A Lot  How much help from another person for climbing 3-5 steps with a railing?: A Lot  AM-PAC Inpatient Mobility Raw Score : 14  AM-PAC Inpatient T-Scale Score : 38.1  Mobility Inpatient CMS 0-100% Score: 61.29  Mobility Inpatient CMS G-Code Modifier : CL    Nursing cleared patient for PT treatment. OBJECTIVE;   Initial Evaluation  Date: 11/18/20 Treatment Date:    11/22/2020   Short Term/ Long Term   Goals   Was pt agreeable to Eval/treatment? Yes   Yes To be met in 3 days   Pain level   0/10    0/10    Bed Mobility    Rolling: Supervision     Supine to sit: Supervision     Sit to supine: Supervision     Scooting: Supervision    Rolling: Not assessed     Supine to sit: Moderate assist of 1    Sit to supine: Moderate assist of 1    Scooting: Moderate assist of 1     Rolling: Independent    Supine to sit:  Independent    Sit to supine: Independent    Scooting: Independent     Transfers Sit to stand: Minimal assist of 1  From chair, Bedside commode and bed for safety and redirection Sit to stand: Moderate assist of 1; Patient was given instruction for sit to stand transfers regarding weight shifting, sequencing, and proper foot/hand placement to achieve an effective stand with proper knee extension and body mechanics. Sit to stand: Independent     Ambulation    3x20 feet using  wheeled walker with Minimal assist of 1   for balance and o2 line management Patient ambulated 20 feet x 2 reps using wheeled walkerwith Moderate assist of 1. Verbal cues were given for safety, upright posture, increased base of support, walker management, decreased space from walker, and obstacle negotiation. 75 feet using  wheeled walker with Supervision     ROM Within functional limits       Strength BUE:  refer to OT eval  RLE:  4/5  LLE:  4/5   Increase strength in affected mm groups by 1/3 grade   Balance Sitting EOB:  good    Dynamic Standing:  fair   Sitting EOB: good     Dynamic Standing: poorusing wheeled walker   Sitting EOB:  good    Dynamic Standing: good       Patient is Alert & Oriented x person, place, time and situation and follows directions    Sensation: Patient denies numbness and tingling     Edema: no   Endurance: fair       Patient education  Patient educated on role of Physical Therapy, risks of immobility, safety and plan of care. Patient was educated and facilitated on techniques to increase safety and independence with bed mobility, balance, functional transfers, and functional mobility. Patient response to education:   Pt verbalized understanding Pt demonstrated skill Pt requires further education in this area   Yes Partial Yes      Treatment:  Patient practiced and was instructed/facilitated in the following treatment:   Patient transferred to edge of bed and sat up x 5 minutes to increase dynamic sitting balance and activity tolerance. Patient performed sit to stand transfers and gait training. Patient stood in place  x 2 reps for 2-3 minutes each. Patient was returned to bed at end of treatment. Therapeutic Exercises: not performed    At end of session, patient in bed with alarm, call light and phone within reach, all lines and tubes intact, nursing notified. Patient would benefit from continued skilled Physical Therapy to improve functional independence and quality of life. Patient's/ family goals   home        ASSESSMENT: Patient required increased assistance for all functional mobility due to fatigue and weakness. Patient would fatigue easily that would cause her to shuffle feet, flex posture and mange walker poorly. Patient would need consistent cueing for safety and redirection of task. Plan of Care:   -Standing Balance: Perform strengthening exercises in standing to promote motor control with or without upper extremity support   -Transfers: Provide instruction on proper hand and foot position for adequate transfer of weight onto lower extremities and use of gait device  -Gait: Gait training and Standing activities to improve: base of support, weight shift, weight bearing    -Endurance: Utilize Supervised activities to increase level of endurance to allow for safe functional mobility including transfers and gait     Patient and or family understand(s) diagnosis, prognosis, and plan of care. Frequency of treatments: Patient will be seen daily.        Time in: 1:58   Time out: 2:23    Total Treatment Time: 25 minutes    CPT codes:  Therapeutic activities (42367)   10 minutes  1 unit(s)  Gait Training ([id number]) 15 minutes 1 unit(s)     Gael Traore PTA   LIC# [id number]

## 2020-11-22 NOTE — PROGRESS NOTES
[address] [place] [address] [facility]  [facility]  Progress Note    Face to face encounter  CC:confused- has sitter   SUBJECTIVE:  Patient is tolerating medications. No reported adverse drug reactions. ROS:has a sitter  More awake and alert  nad  Has diarrhea      Medications:  Scheduled Meds:   B-complex with vitamin C  1 tablet Oral Daily    potassium chloride  20 mEq Oral Daily    vancomycin  250 mg Oral 4 times per day    lactobacillus  1 capsule Oral Daily    linezolid  600 mg Oral 2 times per day    amLODIPine  5 mg Oral Daily    DULoxetine  40 mg Oral Nightly    losartan  50 mg Oral Nightly    metoprolol succinate  25 mg Oral Daily    mirabegron  25 mg Oral Nightly    montelukast  10 mg Oral Nightly    ipratropium-albuterol  3 mL Inhalation 4x daily    levothyroxine  137 mcg Oral Daily    warfarin  7.5 mg Oral QPM    clotrimazole-betamethasone   Topical BID    Arformoterol Tartrate  15 mcg Nebulization BID    And    budesonide  0.5 mg Nebulization BID     Continuous Infusions:   dextrose      sodium chloride 50 mL/hr at 11/22/20 0814     PRN Meds:acetaminophen, ondansetron, glucose, dextrose, glucagon (rDNA), dextrose, haloperidol lactate  OBJECTIVE:  Patient Vitals for the past 24 hrs:   BP Temp Temp src Pulse Resp SpO2   11/22/20 0820 (!) 163/77 98.1 °F (36.7 °C) Oral 97 18 93 %   11/21/20 2115 (!) 158/84 98.1 °F (36.7 °C) -- 108 18 95 %   11/21/20 1645 (!) 143/78 97.9 °F (36.6 °C) Oral 105 18 97 %     Constitutional: The patient is awake   Skin: Warm and dry. No rashes were noted. Head: Eyes show round, and reactive pupils. No jaundice. Chest: No use of accessory muscles to breathe.  ctab ant  Cardiovascular: S1 and S2 are rhythmic and regular. Abdomen: soft  Benign to palpation. No masses felt. Distended   Extremities:  , no edema. piv    Wound Care Documentation:  Wound 11/18/20 Back center (Active)   Dressing Status Clean;Dry; Intact 11/21/20 0820   Dressing/Treatment Foam 11/21/20 0820   Wound Length (cm) 1.7 cm 11/18/20 0140   Wound Width (cm) 1 cm 11/18/20 0140   Wound Surface Area (cm^2) 1.7 cm^2 11/18/20 0140   Drainage Amount None 11/19/20 0800   Number of days: 3       Laboratory and Tests Review:  Lab Results   Component Value Date    WBC 11.3 11/22/2020    WBC 8.9 11/21/2020    WBC 9.6 11/20/2020    HGB 10.2 (L) 11/22/2020    HCT 31.0 (L) 11/22/2020    MCV 84.0 11/22/2020     11/22/2020     Lab Results   Component Value Date    NEUTROABS 7.79 (H) 11/22/2020    NEUTROABS 5.10 11/21/2020    NEUTROABS 5.89 11/20/2020     Lab Results   Component Value Date    CRP 14.9 (H) 12/29/2019    CRP 1.8 (H) 12/27/2019     Lab Results   Component Value Date    SEDRATE 86 (H) 12/29/2019    SEDRATE 84 (H) 12/29/2019    SEDRATE 86 (H) 12/27/2019     Lab Results   Component Value Date    ALT 9 11/22/2020    AST 18 11/22/2020    ALKPHOS 111 (H) 11/22/2020    BILITOT <0.2 11/22/2020     Lab Results   Component Value Date     11/22/2020    K 3.8 11/22/2020    K 4.4 10/06/2020     11/22/2020    CO2 21 11/22/2020    BUN 12 11/22/2020    CREATININE 0.8 11/22/2020    GFRAA >60 11/22/2020    LABGLOM >60 11/22/2020    GLUCOSE 53 11/22/2020    GLUCOSE 158 10/03/2011    PROT 5.8 11/22/2020    LABALBU 2.5 11/22/2020    LABALBU 4.0 10/03/2011    CALCIUM 7.9 11/22/2020    BILITOT <0.2 11/22/2020    ALKPHOS 111 11/22/2020    AST 18 11/22/2020    ALT 9 11/22/2020     Radiology:  Reviewed      Microbiology:   11/17/2020- blood cx- enterococcus species  C.diff- ++ detected  Urine cx- E.coli     ASSESSMENT:  · VRE Enterococcus faecium bacteremia-- ?  Source   · C.diff- recurrent   · History of falls- with c-spine fracture   · Bacteruria with E.coli   · Diarrhea add probiotics    PLAN:  · Stop  cefepime   · Continue zyvox   Follow cbc  · Continue PO vancomycin   · Will need CARLOS  · Repeat blood cultures  ngtd  · Check cultures  · May need CT A/P   · Monitor labs         Electronically signed by Glenny Eduardo MD on 11/22/2020 at 12:17 PM    Phone [phone]  Fax [phone]

## 2020-11-23 NOTE — PROGRESS NOTES
[address] [place] [address] [facility]  [facility]  Progress Note    Face to face encounter  CC:confused- has sitter   SUBJECTIVE:  Patient is tolerating medications. No reported adverse drug reactions. ROS:no fevers. No chest pain. No rash. Confused. More awake and alert. NAD    Medications:  Scheduled Meds:   lactobacillus  1 capsule Oral Q12H    B-complex with vitamin C  1 tablet Oral Daily    potassium chloride  20 mEq Oral Daily    linezolid  600 mg Oral 2 times per day    amLODIPine  5 mg Oral Daily    DULoxetine  40 mg Oral Nightly    losartan  50 mg Oral Nightly    metoprolol succinate  25 mg Oral Daily    mirabegron  25 mg Oral Nightly    montelukast  10 mg Oral Nightly    ipratropium-albuterol  3 mL Inhalation 4x daily    levothyroxine  137 mcg Oral Daily    warfarin  7.5 mg Oral QPM    clotrimazole-betamethasone   Topical BID    Arformoterol Tartrate  15 mcg Nebulization BID    And    budesonide  0.5 mg Nebulization BID     Continuous Infusions:   dextrose       PRN Meds:acetaminophen, ondansetron, glucose, dextrose, glucagon (rDNA), dextrose, haloperidol lactate  OBJECTIVE:  Patient Vitals for the past 24 hrs:   BP Temp Temp src Pulse Resp SpO2   11/23/20 0800 (!) 143/73 97.7 °F (36.5 °C) Oral 100 16 95 %   11/22/20 2000 (!) 166/77 98.2 °F (36.8 °C) Oral 107 17 94 %     Constitutional: The patient is awake - confused. Skin: Warm and dry. No rashes were noted. Head: Eyes show round, and reactive pupils. No jaundice. Chest: No use of accessory muscles to breathe.  ctab ant  Cardiovascular: S1 and S2 are rhythmic and regular. Abdomen: soft  Benign to palpation. No masses felt. Distended   Extremities:   no edema. No iv access- keeps pulling out lines    Wound Care Documentation:  Wound 11/18/20 Back center (Active)   Dressing Status Clean;Dry; Intact 11/21/20 0820   Dressing/Treatment Foam 11/21/20 0820   Wound Length (cm) 1.7 cm 11/18/20 0140   Wound Width (cm) 1 cm 11/18/20 CHRISTAL Holt - CNS on 11/23/2020 at 1:05 PM    Phone [phone]  Fax [phone]    agree as above     Kyle Garay

## 2020-11-23 NOTE — PROGRESS NOTES
Department of Internal Medicine  General Internal Medicine  Attending Progress Note      Subjective: The patient is sleepy but easily arousable . No problems overnight. Denies chest pain, angina, and dyspnea. Denies abdominal pain. Tolerating diet. No nausea or vomiting. Is going for CARLOS this afternoon    Objective:  Pt is sleepy    BP (!) 143/73   Pulse 100   Temp 97.7 °F (36.5 °C) (Oral)   Resp 16   Ht 5' 5\" (1.651 m)   Wt 132 lb 11.2 oz (60.2 kg)   SpO2 95%   BMI 22.08 kg/m²     Head and Neck: normal atraumatic, no neck masses, normal thyroid, no jvd    Heart:  regular rate and rhythm, S1, S2 normal, no murmur, click, rub or gallop    Lungs:  chest with improved air exchange no wheezing, rales, symmetric air entry, no chest wall deformities or tenderness    Abd: soft, non-tender, without masses or organomegaly    Extrem:  No clubbing, cyanosis, or edema    Neuro: no focal neurological deficit     Skin: No rashes, lesions, or ecchymosis, no ulcers. Psych: No apparent anxiety, agitation, or depression.      Labs:   CBC with Differential:    Lab Results   Component Value Date    WBC 10.2 11/23/2020    RBC 3.64 11/23/2020    HGB 9.9 11/23/2020    HCT 31.4 11/23/2020     11/23/2020    MCV 86.3 11/23/2020    MCH 27.2 11/23/2020    MCHC 31.5 11/23/2020    RDW 15.1 11/23/2020    SEGSPCT 64 02/15/2013    LYMPHOPCT 12.9 11/23/2020    MONOPCT 9.8 11/23/2020    MYELOPCT 0.9 11/11/2018    BASOPCT 0.5 11/23/2020    MONOSABS 1.00 11/23/2020    LYMPHSABS 1.32 11/23/2020    EOSABS 0.16 11/23/2020    BASOSABS 0.05 11/23/2020     CMP:    Lab Results   Component Value Date     11/23/2020    K 3.9 11/23/2020    K 4.4 10/06/2020    CL 98 11/23/2020    CO2 23 11/23/2020    BUN 13 11/23/2020    CREATININE 0.8 11/23/2020    GFRAA >60 11/23/2020    LABGLOM >60 11/23/2020    GLUCOSE 95 11/23/2020    GLUCOSE 158 10/03/2011    PROT 6.0 11/23/2020    LABALBU 2.7 11/23/2020    LABALBU 4.0 10/03/2011    CALCIUM 8.3 11/23/2020    BILITOT 0.3 11/23/2020    ALKPHOS 115 11/23/2020    AST 19 11/23/2020    ALT 9 11/23/2020     PT/INR:    Lab Results   Component Value Date    PROTIME 19.1 11/23/2020    INR 1.7 11/23/2020            Assessment:    Principal Problem:    Pneumonia  Active Problems:    Diabetes mellitus type II, controlled ([address]    Chronic obstructive pulmonary disease (HCC)    Altered mental status    Essential hypertension    Mixed hyperlipidemia    C. difficile colitis    Acute urinary tract infection  Resolved Problems:    * No resolved hospital problems. *      Plan:  1. For CARLOS this afternoon  2.  Continue current management      See orders    Electronically signed by Jasmine Santoro MD on 11/23/2020 at 1:29 PM

## 2020-11-23 NOTE — CARE COORDINATION
11/23/2020 1452 CM note: NEGATIVE COVID TESTING 11/17/20 and 11/18/20 (per resp panel). Pt in isolation for C DIFF. Pt is 1:1,resides at home with family and has 24/7 supervision. Family denies SNF placement, stating pt does better in home setting. Pt has home o2 2l NC through [address] and nebulizer. Pt is active with [facility] and [facility] orders received. Pt currently out of room for CARLOS. CM/SW will follow for antibiotic needs. IF po vanco will be ordered for discharge-will need Rx to check cost/copay.  Apryl CHUN

## 2020-11-23 NOTE — PROGRESS NOTES
Foam 11/21/20 0820   Wound Length (cm) 1.7 cm 11/18/20 0140   Wound Width (cm) 1 cm 11/18/20 0140   Wound Surface Area (cm^2) 1.7 cm^2 11/18/20 0140   Drainage Amount None 11/19/20 0800   Number of days: 3       Laboratory and Tests Review:  Lab Results   Component Value Date    WBC 10.2 11/23/2020    WBC 11.3 11/22/2020    WBC 8.9 11/21/2020    HGB 9.9 (L) 11/23/2020    HCT 31.4 (L) 11/23/2020    MCV 86.3 11/23/2020     11/23/2020     Lab Results   Component Value Date    NEUTROABS 7.62 (H) 11/23/2020    NEUTROABS 7.79 (H) 11/22/2020    NEUTROABS 5.10 11/21/2020     Lab Results   Component Value Date    CRP 14.9 (H) 12/29/2019    CRP 1.8 (H) 12/27/2019     Lab Results   Component Value Date    SEDRATE 86 (H) 12/29/2019    SEDRATE 84 (H) 12/29/2019    SEDRATE 86 (H) 12/27/2019     Lab Results   Component Value Date    ALT 9 11/23/2020    AST 19 11/23/2020    ALKPHOS 115 (H) 11/23/2020    BILITOT 0.3 11/23/2020     Lab Results   Component Value Date     11/23/2020    K 3.9 11/23/2020    K 4.4 10/06/2020    CL 98 11/23/2020    CO2 23 11/23/2020    BUN 13 11/23/2020    CREATININE 0.8 11/23/2020    GFRAA >60 11/23/2020    LABGLOM >60 11/23/2020    GLUCOSE 95 11/23/2020    GLUCOSE 158 10/03/2011    PROT 6.0 11/23/2020    LABALBU 2.7 11/23/2020    LABALBU 4.0 10/03/2011    CALCIUM 8.3 11/23/2020    BILITOT 0.3 11/23/2020    ALKPHOS 115 11/23/2020    AST 19 11/23/2020    ALT 9 11/23/2020     Radiology:  Reviewed      Microbiology:   11/17/2020- blood cx- enterococcus faecium and CONS  11/20/2020- blood cx-  No growth to date   C.diff- ++ detected  Urine cx- E.coli     ASSESSMENT:  · VRE Enterococcus faecium bacteremia-- ?  Source   · C.diff- recurrent   · History of falls- with c-spine fracture   · Bacteruria with E.coli   · Diarrhea add probiotics    PLAN:  · Continue zyvox     · Follow cbc and platelets   · Continue PO vancomycin - continue for 14 days   · For CARLOS today - follow results   · Repeat blood cultures  ngtd  · Check cultures  · Monitor labs    Electronically signed by CHRISTAL Sutherland on 11/23/2020 at 1:49 PM    Phone [phone]  Fax [phone]  As above    This is a face to face encounter with Jael Terrell on 11/23/20. I have performed and participated in the history, exam, medical decision making, and  POC  with the NURSE PRACTITIONER and provided the instruction and education regarding this patient's care. Imaging and labs were reviewed per medical records and any ID pertinent labs were addressed with the patient. The patient was educated about the diagnosis, prognosis, indications, risks and benefits of treatment. Pt had the opportunity to ask questions. All questions were answered. More awake and active has sitter s/p CARLOS   Summary   No intracardiac vegetation. Normal left ventricular systolic function. Normal right ventricle structure and function. Aortic valve calcification. No aortic regurgitation. Moderate atherosclerosis of the descending aorta and aortic arch. Plan linezolid 2 weeks  vanco po 4 weeks    Thank you for involving me in the care of Jael Terrell. Please do not hesitate to call for any questions or concerns.     Electronically signed by Juliana Aquino MD on 11/23/2020 at 7:17 PM    Phone [phone]  Fax [phone]

## 2020-11-23 NOTE — ANESTHESIA PRE PROCEDURE
Department of Anesthesiology  Preprocedure Note       Name:  Anil Regan   Age:  79 y.o.  :  1940                                          MRN:  [id number]         Date:  2020      Surgeon: Shawna Christian):  Christie Holman MD    Procedure: Procedure(s):  TRANSESOPHAGEAL ECHOCARDIOGRAM    Medications prior to admission:   Prior to Admission medications    Medication Sig Start Date End Date Taking?  Authorizing Provider   Nutritional Supplements (ENSURE ORIGINAL) LIQD Take 1 Can by mouth three times daily    Yes Historical Provider, MD   acetaminophen (TYLENOL) 500 MG tablet Take 500 mg by mouth every 6 hours as needed for Pain   Yes Historical Provider, MD   rosuvastatin (CRESTOR) 20 MG tablet Take 20 mg by mouth nightly   Yes Historical Provider, MD   mirabegron (MYRBETRIQ) 25 MG TB24 Take 25 mg by mouth nightly   Yes Historical Provider, MD   warfarin (COUMADIN) 7.5 MG tablet Take 7.5 mg by mouth nightly   Yes Historical Provider, MD   ondansetron (ZOFRAN ODT) 4 MG disintegrating tablet Take 1 tablet by mouth every 8 hours as needed for Nausea or Vomiting 10/6/20 10/6/21 Yes Pablo Yan DO   levothyroxine (SYNTHROID) 137 MCG tablet Take 135 mcg by mouth Daily   Yes Historical Provider, MD   metoprolol succinate (TOPROL XL) 25 MG extended release tablet Take 1 tablet by mouth daily 18  Yes Prashanth Palmer MD   ipratropium-albuterol (DUONEB) 0.5-2.5 (3) MG/3ML SOLN nebulizer solution Inhale 3 mLs into the lungs every 6 hours as needed for Shortness of Breath 17  Yes Miranda Aguilar MD   amLODIPine (NORVASC) 5 MG tablet Take 1 tablet by mouth daily 17  Yes Miranda Aguilar MD   DULoxetine HCl 40 MG CPEP Take 40 mg by mouth nightly    Yes Historical Provider, MD   glimepiride (AMARYL) 2 MG tablet Take 2 mg by mouth every morning (before breakfast)   Yes Historical Provider, MD   omeprazole (PRILOSEC) 40 MG capsule Take 40 mg by mouth daily   Yes Historical Provider, MD fluticasone-salmeterol (ADVAIR) 250-50 MCG/DOSE AEPB Inhale 1 puff into the lungs every 12 hours   Yes Historical Provider, MD   tiotropium (SPIRIVA) 18 MCG inhalation capsule Inhale 18 mcg into the lungs daily. Yes Historical Provider, MD   losartan (COZAAR) 50 MG tablet Take 50 mg by mouth nightly    Yes Historical Provider, MD   montelukast (SINGULAIR) 10 MG tablet Take 10 mg by mouth nightly.    Yes Historical Provider, MD   OXYGEN Inhale 2 L into the lungs nightly    Yes Historical Provider, MD       Current medications:    Current Facility-Administered Medications   Medication Dose Route Frequency Provider Last Rate Last Dose    vancomycin (VANCOCIN) oral solution 125 mg  125 mg Oral 4 times per day CHRISTAL Og - CNS        lactobacillus (CULTURELLE) capsule 1 capsule  1 capsule Oral Q12H Mandy Juan MD   1 capsule at 11/22/20 2038    B-complex with vitamin C tablet 1 tablet  1 tablet Oral Daily Mandy Juan MD   1 tablet at 11/22/20 0817    potassium chloride (KLOR-CON M) extended release tablet 20 mEq  20 mEq Oral Daily Cassandra Friend MD   20 mEq at 11/22/20 0816    linezolid (ZYVOX) tablet 600 mg  600 mg Oral 2 times per day CHRISTAL Kam - NP   600 mg at 11/22/20 2038    acetaminophen (TYLENOL) tablet 500 mg  500 mg Oral Q6H PRN Cassandra Friend MD   500 mg at 11/23/20 1047    amLODIPine (NORVASC) tablet 5 mg  5 mg Oral Daily Cassandra Friend MD   5 mg at 11/22/20 0816    DULoxetine (CYMBALTA) extended release capsule 40 mg  40 mg Oral Nightly Cassandra Friend MD   40 mg at 11/22/20 2037    losartan (COZAAR) tablet 50 mg  50 mg Oral Nightly Cassandra Friend MD   50 mg at 11/22/20 2038    metoprolol succinate (TOPROL XL) extended release tablet 25 mg  25 mg Oral Daily Cassandra Friend MD   25 mg at 11/23/20 1357    mirabegron (MYRBETRIQ) extended release tablet 25 mg  25 mg Oral Nightly Cassandra Friend MD        montelukast (SINGULAIR) tablet 10 mg  10 mg Oral Nightly Cassandra Friend MD   10 mg at 11/22/20 2038    ondansetron (ZOFRAN-ODT) disintegrating tablet 4 mg  4 mg Oral Q8H PRN Cassandra Friend MD   4 mg at 11/22/20 1943    ipratropium-albuterol (DUONEB) nebulizer solution 3 mL  3 mL Inhalation 4x daily Cassandra Friend MD   3 mL at 11/23/20 1318    levothyroxine (SYNTHROID) tablet 137 mcg  137 mcg Oral Daily Cassandra Friend MD   137 mcg at 11/22/20 0600    warfarin (COUMADIN) tablet 7.5 mg  7.5 mg Oral QPM Cassandra Friend MD   Stopped at 11/22/20 1602    clotrimazole-betamethasone (LOTRISONE) cream   Topical BID Cassandra Friend MD        Arformoterol Tartrate (BROVANA) nebulizer solution 15 mcg  15 mcg Nebulization BID Cassandra Friend MD   15 mcg at 11/23/20 0746    And    budesonide (PULMICORT) nebulizer suspension 500 mcg  0.5 mg Nebulization BID Cassandra Friend MD   500 mcg at 11/23/20 0747    glucose (GLUTOSE) 40 % oral gel 15 g  15 g Oral PRN Cassandra Friend MD        dextrose 50 % IV solution  12.5 g Intravenous PRN Cassandra Friend MD   12.5 g at 11/22/20 0725    glucagon (rDNA) injection 1 mg  1 mg Intramuscular PRN Cassandra Friend MD        dextrose 5 % solution  100 mL/hr Intravenous PRN Cassandra Friend MD        haloperidol lactate (HALDOL) injection 2 mg  2 mg Intramuscular Q8H PRN Cassandra Friend MD   2 mg at 11/23/20 8186     Facility-Administered Medications Ordered in Other Encounters   Medication Dose Route Frequency Provider Last Rate Last Dose    0.9 % sodium chloride infusion    Continuous PRN CHRISTAL Stokes - CRNA           Allergies:     Allergies   Allergen Reactions    Latex Itching and Rash    Codeine Anxiety    Morphine Anxiety       Problem List:    Patient Active Problem List   Diagnosis Code    Breast mass, right N63.10    DJD (degenerative joint disease) of knee M17.10    Knee pain M25.569    Iron (Fe) deficiency anemia D50.9    Diabetes mellitus type II, controlled ([facility] [address] E11.9    Chronic obstructive pulmonary disease ([email or web address] J44.9    Atelectasis J98.11    Hyponatremia E87.1    S/P total knee arthroplasty Z96.659    Status post total bilateral knee replacement Z96.653    Localized osteoarthrosis, lower leg M17.10    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 difficile colitis A04.72    Acute urinary tract infection N39.0       Past Medical History:        Diagnosis Date    Arthritis     joints, knees, back  djd    Cerebral artery occlusion with cerebral infarction ([email or web address]     Chronic back pain     COPD (chronic obstructive pulmonary disease) ([email or web address]     Diabetes mellitus ([facility])     GERD (gastroesophageal reflux disease)     H/O cardiovascular stress test 2018    lexiscan stress test    Hyperlipidemia     Thyroid disease        Past Surgical History:        Procedure Laterality Date    APPENDECTOMY      BREAST SURGERY      cyst     CHOLECYSTECTOMY      COLONOSCOPY      ENDOSCOPY, COLON, DIAGNOSTIC      HYSTERECTOMY      JOINT REPLACEMENT      left knee    KNEE ARTHROPLASTY Right 1/15/2014    TOTAL KNEE ARTHROPLASTY    THYROIDECTOMY         Social History:    Social History     Tobacco Use    Smoking status: Former Smoker     Packs/day: 1.00     Years: 60.00     Pack years: 60.00     Last attempt to quit: 10/18/2020     Years since quittin.0    Smokeless tobacco: Never Used   Substance Use Topics    Alcohol use:  No                                Counseling given: Not Answered      Vital Signs (Current):   Vitals:    11/21/20 2115 11/22/20 0820 11/22/20 2000 11/23/20 0800   BP: (!) 158/84 (!) 163/77 (!) 166/77 (!) 143/73   Pulse: 108 97 107 100   Resp: 18 18 17 16   Temp: 98.1 °F (36.7 °C) 98.1 °F (36.7 °C) 98.2 °F (36.8 °C) 97.7 °F (36.5 °C)   TempSrc:  Oral Oral Oral   SpO2: 95% 93% 94% 95%   Weight:       Height:                                                  BP Readings from Last 3 Encounters:   11/23/20 (!) 143/73   10/25/20 (!) 155/85   10/06/20 (!) 176/84       NPO Status:                                                                                 BMI:   Wt Readings from Last 3 Encounters:   11/18/20 132 lb 11.2 oz (60.2 kg)   10/25/20 115 lb (52.2 kg)   10/06/20 115 lb (52.2 kg)     Body mass index is 22.08 kg/m². CBC:   Lab Results   Component Value Date    WBC 10.2 11/23/2020    RBC 3.64 11/23/2020    HGB 9.9 11/23/2020    HCT 31.4 11/23/2020    MCV 86.3 11/23/2020    RDW 15.1 11/23/2020     11/23/2020       CMP:   Lab Results   Component Value Date     11/23/2020    K 3.9 11/23/2020    K 4.4 10/06/2020    CL 98 11/23/2020    CO2 23 11/23/2020    BUN 13 11/23/2020    CREATININE 0.8 11/23/2020    GFRAA >60 11/23/2020    LABGLOM >60 11/23/2020    GLUCOSE 95 11/23/2020    GLUCOSE 158 10/03/2011    PROT 6.0 11/23/2020    CALCIUM 8.3 11/23/2020    BILITOT 0.3 11/23/2020    ALKPHOS 115 11/23/2020    AST 19 11/23/2020    ALT 9 11/23/2020       POC Tests: No results for input(s): POCGLU, POCNA, POCK, POCCL, POCBUN, POCHEMO, POCHCT in the last 72 hours.     Coags:   Lab Results   Component Value Date    PROTIME 19.1 11/23/2020    INR 1.7 11/23/2020    APTT 34.7 12/10/2017       HCG (If Applicable): No results found for: PREGTESTUR, PREGSERUM, HCG, HCGQUANT     ABGs:   Lab Results   Component Value Date    PO2ART 54.2 11/17/2020    YWU6NTA 36.6 11/17/2020    TJE8AIZ 20.0 11/17/2020        Type & Screen (If Applicable):  No results found for: LABABO, LABRH    Drug/Infectious Status (If

## 2020-11-23 NOTE — PROGRESS NOTES
Physical Therapy    Physical Therapy Treatment Note    Room #:  5123/6995-14  Patient Name: Mona Goff  YOB: 1940  MRN: [id number]    Referring Provider: Khushbu Pompa MD     Date of Service: 11/23/2020    Evaluating Physical Therapist: Radha Howell, PT  #[id number]       Diagnosis: Pneumonia [J18.9] Admitted with nausea, vomiting, diarrhea        Patient Active Problem List   Diagnosis    Breast mass, right    DJD (degenerative joint disease) of knee    Knee pain    Iron (Fe) deficiency anemia    Diabetes mellitus type II, controlled ([address]    Chronic obstructive pulmonary disease (HCC)    Atelectasis    Hyponatremia    S/P total knee arthroplasty    Status post total bilateral knee replacement    Localized osteoarthrosis, lower leg    Failed total left knee replacement (HCC)    Altered mental status    Generalized weakness    Weakness of left lower extremity    Closed nondisplaced fracture of acromial end of right clavicle    Intertrochanteric fracture of right hip, closed, initial encounter ([facility] [address] [facility]    Essential hypertension    Mixed hyperlipidemia    Bronchospasm    Tachycardia    Traumatic arthritis of wrist    Sepsis secondary to cat scratch ([address]    Pneumonia    C. difficile colitis    Acute urinary tract infection      Tentative placement recommendation: Subacute vs Home Health Physical Therapy if patient meets goals    Equipment recommendation: None      Prior Level of Function: Patient ambulated with supervision     Rehab Potential: good for baseline    Past medical history:   Past Medical History:   Diagnosis Date    Arthritis     joints, knees, back  djd    Cerebral artery occlusion with cerebral infarction ([address]     Chronic back pain     COPD (chronic obstructive pulmonary disease) ([address]     Diabetes mellitus ([address]     GERD (gastroesophageal reflux disease)     H/O cardiovascular stress test 11/12/2018    lexiscan stress test    Hyperlipidemia     Thyroid disease      Past Surgical History:   Procedure Laterality Date    APPENDECTOMY      BREAST SURGERY      cyst     CHOLECYSTECTOMY      COLONOSCOPY      ENDOSCOPY, COLON, DIAGNOSTIC      HYSTERECTOMY      JOINT REPLACEMENT      left knee    KNEE ARTHROPLASTY Right 1/15/2014    TOTAL KNEE ARTHROPLASTY    THYROIDECTOMY       Precautions: Activity as tolerated, falls, alarm and dementia ,      SUBJECTIVE:  Social history: Patient lives with spouse and dtr who states 24/7 in her house in a ranch home with 1 step to enter   Equipment owned: Cane, Wheeled Walker, Bedside commode, Shower chair and O2,       [address]   How much difficulty turning over in bed?: A Little  How much difficulty sitting down on / standing up from a chair with arms?: A Lot  How much difficulty moving from lying on back to sitting on side of bed?: A Lot  How much help from another person moving to and from a bed to a chair?: A Little  How much help from another person needed to walk in hospital room?: A Lot  How much help from another person for climbing 3-5 steps with a railing?: A Lot  AM-PAC Inpatient Mobility Raw Score : 14  AM-PAC Inpatient T-Scale Score : 38.1  Mobility Inpatient CMS 0-100% Score: 61.29  Mobility Inpatient CMS G-Code Modifier : CL    Nursing cleared patient for PT treatment. Patient was given Haldol per nursing where patient displayed increased fatigue and weakness. OBJECTIVE;   Initial Evaluation  Date: 11/18/20 Treatment Date:    11/23/2020   Short Term/ Long Term   Goals   Was pt agreeable to Eval/treatment? Yes   Yes To be met in 3 days   Pain level   0/10    0/10    Bed Mobility    Rolling: Supervision     Supine to sit: Supervision     Sit to supine: Supervision     Scooting: Supervision    Rolling: Not assessed     Supine to sit: Moderate assist of 1    Sit to supine: Moderate assist of 1    Scooting:  Moderate assist of 1     Rolling: Independent    Supine to sit: Independent    Sit to supine: Independent    Scooting: Independent     Transfers Sit to stand: Minimal assist of 1  From chair, Bedside commode and bed for safety and redirection Sit to stand: Moderate assist of 1; Patient was given instruction for sit to stand transfers regarding weight shifting, sequencing, and proper foot/hand placement to achieve an effective stand with proper knee extension and body mechanics. Sit to stand: Independent     Ambulation    3x20 feet using  wheeled walker with Minimal assist of 1   for balance and o2 line management Patient sidestepped 3 feet and ambulated forward/backward 5 feet  using wheeled walkerwith Moderate assist of 1. Verbal cues were given for safety, upright posture, increased base of support, walker management, decreased space from walker, and obstacle negotiation. 75 feet using  wheeled walker with Supervision     ROM Within functional limits       Strength BUE:  refer to OT eval  RLE:  4/5  LLE:  4/5   Increase strength in affected mm groups by 1/3 grade   Balance Sitting EOB:  good    Dynamic Standing:  fair   Sitting EOB: fair     Dynamic Standing: poorusing wheeled walker   Sitting EOB:  good    Dynamic Standing: good       Patient is Alert & Oriented x person, place, time and situation and follows directions    Sensation: Patient denies numbness and tingling     Edema: no   Endurance: fair       Patient education  Patient educated on role of Physical Therapy, risks of immobility, safety and plan of care. Patient was educated and facilitated on techniques to increase safety and independence with bed mobility, balance, functional transfers, and functional mobility.      Patient response to education:   Pt verbalized understanding Pt demonstrated skill Pt requires further education in this area   Yes Partial Yes      Treatment:  Patient practiced and was instructed/facilitated in the following treatment:   Patient transferred to edge of bed and sat up x 10 minutes to increase dynamic sitting balance and activity tolerance. Patient performed sit to stand transfers and gait training. Patient was returned to bed at end of treatment. Patient performed below exercises. Therapeutic Exercises: ankle pumps, heel slide, hip abduction/adduction and straight leg raise x 10 reps. Verbal cues were given for correct technique and to perform 2-3x daily. At end of session, patient in bed with alarm, call light and phone within reach, all lines and tubes intact, nursing notified. Patient would benefit from continued skilled Physical Therapy to improve functional independence and quality of life. Patient's/ family goals   home        ASSESSMENT: Patient required increased assistance for all functional mobility and had decreased ambulation distance due to fatigue and weakness. Patient would fatigue easily that would cause her to shuffle feet, flex posture and mange walker poorly. Patient would need consistent cueing for safety and redirection of task. Plan of Care:   -Standing Balance: Perform strengthening exercises in standing to promote motor control with or without upper extremity support   -Transfers: Provide instruction on proper hand and foot position for adequate transfer of weight onto lower extremities and use of gait device  -Gait: Gait training and Standing activities to improve: base of support, weight shift, weight bearing    -Endurance: Utilize Supervised activities to increase level of endurance to allow for safe functional mobility including transfers and gait     Patient and or family understand(s) diagnosis, prognosis, and plan of care. Frequency of treatments: Patient will be seen daily.        Time in: 1:32  Time out: 1:56    Total Treatment Time: 24 minutes    CPT codes:  Therapeutic exercises (46230)   12 minutes  1 unit(s)  Gait Training (53157) 12 minutes 1 unit(s)     Ama Mesa PTA   LIC# [id number]

## 2020-11-24 PROBLEM — R53.83 LETHARGIC: Status: ACTIVE | Noted: 2020-01-01

## 2020-11-24 PROBLEM — E87.70 FLUID OVERLOAD: Status: ACTIVE | Noted: 2020-01-01

## 2020-11-24 NOTE — CARE COORDINATION
11/24/2020 1247 CM note: NEGATIVE COVID TESTING 11/17/20 and 11/18/20 (per resp panel). Pt in isolation for C DIFF. Pt is 1:1,resides at home with family and has 24/7 supervision. Family denies SNF placement. Pt has home o2 2l NC through [facility] and nebulizer. Pt is active with [facility] and [facility] orders received. Per [address] [place] [address] outpt pharmacy,both zyvox and po vancomycin do not require prior auth and have $5.00 copay each. Total cost for all meds through meds to bed program is $29.87. Spoke with pt's daughter Miroslava Borges by phone([phone]) regarding med cost and transition of care plan. Sharon agreeable to meds to bed program and copay cost. Plan is for pt to return home with family with 24/7 care and [facility], family will provide ride.  Apryl CHUN

## 2020-11-24 NOTE — PROGRESS NOTES
[address] [place] [address] [facility]  KELVIN  Progress Note    Face to face encounter  CC:confused- has sitter   SUBJECTIVE:  Patient is tolerating medications. No reported adverse drug reactions. ROS:no fevers. No chest pain. No rash. Confused. Has sitter- ambulating in room- with walker.    tmax- 100.7 this am-  Currently afebrile   Awake-confused  CARLOS- reviewed     Medications:  Scheduled Meds:   vancomycin  125 mg Oral 4 times per day    lactobacillus  1 capsule Oral Q12H    B-complex with vitamin C  1 tablet Oral Daily    potassium chloride  20 mEq Oral Daily    linezolid  600 mg Oral 2 times per day    amLODIPine  5 mg Oral Daily    DULoxetine  40 mg Oral Nightly    losartan  50 mg Oral Nightly    metoprolol succinate  25 mg Oral Daily    mirabegron  25 mg Oral Nightly    montelukast  10 mg Oral Nightly    ipratropium-albuterol  3 mL Inhalation 4x daily    levothyroxine  137 mcg Oral Daily    warfarin  7.5 mg Oral QPM    clotrimazole-betamethasone   Topical BID    Arformoterol Tartrate  15 mcg Nebulization BID    And    budesonide  0.5 mg Nebulization BID     Continuous Infusions:   dextrose       PRN Meds:acetaminophen, ondansetron, glucose, dextrose, glucagon (rDNA), dextrose, haloperidol lactate  OBJECTIVE:  Patient Vitals for the past 24 hrs:   BP Temp Temp src Pulse Resp SpO2   11/24/20 0820 (!) 140/74 98.1 °F (36.7 °C) Oral 91 18 94 %   11/24/20 0400 (!) 146/82 98.8 °F (37.1 °C) Oral 104 22 95 %   11/24/20 0215 132/72 100.2 °F (37.9 °C) Axillary 108 23 94 %   11/24/20 0206 -- 100.7 °F (38.2 °C) Oral -- -- --   11/24/20 0153 -- -- -- 120 -- --   11/24/20 0023 -- 99.9 °F (37.7 °C) Oral -- -- 93 %   11/24/20 0015 -- -- -- -- -- (!) 84 %   11/23/20 1921 (!) 142/70 99.2 °F (37.3 °C) Oral 111 18 91 %   11/23/20 1617 -- -- -- 91 20 91 %   11/23/20 1540 -- -- -- -- -- 95 %   11/23/20 1539 -- -- -- 99 21 95 %   11/23/20 1538 -- -- -- 94 24 97 %   11/23/20 1533 135/77 -- -- 91 25 98 % 11/23/20 1527 -- -- -- 92 27 97 %   11/23/20 1523 135/70 -- -- 90 24 98 %   11/23/20 1518 (!) 164/93 -- -- 105 28 99 %   11/23/20 1511 (!) 191/101 97.5 °F (36.4 °C) Infrared 107 23 100 %     Constitutional: The patient is awake - confused. Sitting at bedside- has sitter. Skin: Warm and dry. No rashes were noted. Head: Eyes show round, and reactive pupils. No jaundice. Chest: No use of accessory muscles to breathe.  ctab ant  Cardiovascular: S1 and S2 are rhythmic and regular. Abdomen: soft  Benign to palpation. No masses felt. Distended   Extremities:   no edema. No iv access- keeps pulling out lines    Wound Care Documentation:  Wound 11/18/20 Back center (Active)   Dressing Status Clean;Dry; Intact 11/21/20 0820   Dressing/Treatment Foam 11/21/20 0820   Wound Length (cm) 1.7 cm 11/18/20 0140   Wound Width (cm) 1 cm 11/18/20 0140   Wound Surface Area (cm^2) 1.7 cm^2 11/18/20 0140   Drainage Amount None 11/19/20 0800   Number of days: 3       Laboratory and Tests Review:  Lab Results   Component Value Date    WBC 10.2 11/23/2020    WBC 11.3 11/22/2020    WBC 8.9 11/21/2020    HGB 9.9 (L) 11/23/2020    HCT 31.4 (L) 11/23/2020    MCV 86.3 11/23/2020     11/23/2020     Lab Results   Component Value Date    NEUTROABS 7.62 (H) 11/23/2020    NEUTROABS 7.79 (H) 11/22/2020    NEUTROABS 5.10 11/21/2020     Lab Results   Component Value Date    CRP 14.9 (H) 12/29/2019    CRP 1.8 (H) 12/27/2019     Lab Results   Component Value Date    SEDRATE 86 (H) 12/29/2019    SEDRATE 84 (H) 12/29/2019    SEDRATE 86 (H) 12/27/2019     Lab Results   Component Value Date    ALT 9 11/23/2020    AST 19 11/23/2020    ALKPHOS 115 (H) 11/23/2020    BILITOT 0.3 11/23/2020     Lab Results   Component Value Date     11/23/2020    K 3.9 11/23/2020    K 4.4 10/06/2020    CL 98 11/23/2020    CO2 23 11/23/2020    BUN 13 11/23/2020    CREATININE 0.8 11/23/2020    GFRAA >60 11/23/2020    LABGLOM >60 11/23/2020    GLUCOSE 95 11/23/2020    GLUCOSE 158 10/03/2011    PROT 6.0 11/23/2020    LABALBU 2.7 11/23/2020    LABALBU 4.0 10/03/2011    CALCIUM 8.3 11/23/2020    BILITOT 0.3 11/23/2020    ALKPHOS 115 11/23/2020    AST 19 11/23/2020    ALT 9 11/23/2020     Radiology:  Reviewed      Microbiology:   11/17/2020- blood cx- enterococcus faecium and CONS  11/20/2020- blood cx-  No growth to date   C.diff- ++ detected  Urine cx- E.coli     ASSESSMENT:  · VRE Enterococcus faecium bacteremia-- ? Source   · C.diff- recurrent   · Fevers   · History of falls- with c-spine fracture   · Bacteruria with E.coli   · Diarrhea add probiotics    PLAN:  · Continue zyvox   - 2 weeks  · Follow cbc and platelets   · Continue PO vancomycin - continue for 4 weeks  · For CARLOS today - follow results   · Check respiratory panel- COVID- had temp early this am- and increasing pulmonary infiltrates on x-ray   · Repeat blood cultures  ngtd  · Check cultures  · Monitor labs    Electronically signed by CHRISTAL Og - CNS on 11/24/2020 at 9:37 AM    Phone [phone]  Fax [phone]    As above    This is a face to face encounter with Therese Cuevas on 11/24/20. I have performed and participated in the history, exam, medical decision making, and  POC  with the NURSE PRACTITIONER and provided the instruction and education regarding this patient's care. Imaging and labs were reviewed per medical records and any ID pertinent labs were addressed with the patient. The patient was educated about the diagnosis, prognosis, indications, risks and benefits of treatment. Pt had the opportunity to ask questions. All questions were answered. PT HAS A SITTER  NOT EATING  DEC RESPONSIVENESS  HAS PUZF772.7   WBC10.3 CR0.9  COVID +  ISOLATION  ON 2L START REMDESIVR  DECADRON       Thank you for involving me in the care of Therese Cuevas. Please do not hesitate to call for any questions or concerns.     Electronically signed by Mandy Juan MD on 11/24/2020 at 3:05 PM    Phone [phone]  Fax [phone]

## 2020-11-24 NOTE — PROGRESS NOTES
Physical Therapy    Physical Therapy Treatment Note    Room #:  4427/6417-66  Patient Name: Jayshree Mack  YOB: 1940  MRN: [id number]    Referring Provider: Belen Mullins MD     Date of Service: 11/24/2020    Evaluating Physical Therapist: Alexandro Gaucher, PT  #[id number]       Diagnosis: Pneumonia [J18.9] Admitted with nausea, vomiting, diarrhea        Patient Active Problem List   Diagnosis    Breast mass, right    DJD (degenerative joint disease) of knee    Knee pain    Iron (Fe) deficiency anemia    Diabetes mellitus type II, controlled ([address]    Chronic obstructive pulmonary disease (HCC)    Atelectasis    Hyponatremia    S/P total knee arthroplasty    Status post total bilateral knee replacement    Localized osteoarthrosis, lower leg    Failed total left knee replacement (HCC)    Altered mental status    Generalized weakness    Weakness of left lower extremity    Closed nondisplaced fracture of acromial end of right clavicle    Intertrochanteric fracture of right hip, closed, initial encounter ([address]    Essential hypertension    Mixed hyperlipidemia    Bronchospasm    Tachycardia    Traumatic arthritis of wrist    Sepsis secondary to cat scratch ([address]    Pneumonia    C. difficile colitis    Acute urinary tract infection    Fluid overload    Lethargic      Tentative placement recommendation: Subacute vs Home Health Physical Therapy if patient meets goals    Equipment recommendation: None      Prior Level of Function: Patient ambulated with supervision     Rehab Potential: good for baseline    Past medical history:   Past Medical History:   Diagnosis Date    Arthritis     joints, knees, back  djd    Cerebral artery occlusion with cerebral infarction ([address]     Chronic back pain     COPD (chronic obstructive pulmonary disease) ([address]     Diabetes mellitus ([address]     GERD (gastroesophageal reflux disease)     H/O cardiovascular stress test 11/12/2018    lexiscan stress cues for technique     Supine to sit: Moderate assist of 1    Sit to supine: Moderate assist of 1    Scooting: Moderate assist of 1     Rolling: Independent    Supine to sit: Independent    Sit to supine: Independent    Scooting: Independent     Transfers Sit to stand: Minimal assist of 1  From chair, Bedside commode and bed for safety and redirection Sit to stand: Moderate assist of 1; Patient was given instruction for sit to stand transfers regarding weight shifting, sequencing, and proper foot/hand placement to achieve an effective stand with proper knee extension and body mechanics. Height of bed raised  Sit to stand: Independent     Ambulation    3x20 feet using  wheeled walker with Minimal assist of 1   for balance and o2 line management Patient ambulated 20  feet  using wheeled walkerwith Moderate assist of 1. Verbal cues were given for safety, upright posture, increased base of support, walker management, decreased space from walker, and obstacle negotiation. Assist to weight shift   75 feet using  wheeled walker with Supervision     ROM Within functional limits       Strength BUE:  refer to OT eval  RLE:  4/5  LLE:  4/5   Increase strength in affected mm groups by 1/3 grade   Balance Sitting EOB:  good    Dynamic Standing:  fair   Sitting EOB: fair     Dynamic Standing: poorusing wheeled walker   Sitting EOB:  good    Dynamic Standing: good       Patient is Alert & Oriented x person, place, time and situation and follows directions    Sensation: Patient denies numbness and tingling     Edema: no   Endurance: fair       Patient education  Patient educated on role of Physical Therapy, risks of immobility, safety and plan of care. Patient was educated and facilitated on techniques to increase safety and independence with bed mobility, balance, functional transfers, and functional mobility.  Educated on benefits of mobility     Patient response to education:   Pt verbalized understanding Pt demonstrated skill Pt requires further education in this area   No Partial Yes      Treatment:  Patient practiced and was instructed/facilitated in the following treatment:   Patient transferred to edge of bed and sat up x 15 minutes to increase dynamic sitting balance and activity tolerance. Patient performed sit to stand transfers and gait training. Patient was returned to bed at end of treatment. Therapeutic Exercises: not performed  At end of session, patient in bed in chair position with alarm,  call light and phone within reach, all lines and tubes intact, nursing notified. Patient would benefit from continued skilled Physical Therapy to improve functional independence and quality of life. Patient's/ family goals   home        ASSESSMENT: Patient required moderate  assistance for all functional mobility   Decreased coordination, strength and endurance limiting function. At risk for falls. Fatigues quickly. Cues throughout for safety and technique . Frequent rests provided     Plan of Care:   -Standing Balance: Perform strengthening exercises in standing to promote motor control with or without upper extremity support   -Transfers: Provide instruction on proper hand and foot position for adequate transfer of weight onto lower extremities and use of gait device  -Gait: Gait training and Standing activities to improve: base of support, weight shift, weight bearing    -Endurance: Utilize Supervised activities to increase level of endurance to allow for safe functional mobility including transfers and gait     Patient and or family understand(s) diagnosis, prognosis, and plan of care. Frequency of treatments: Patient will be seen daily.        Time in:210  Time out: 235    Total Treatment Time: 25 minutes    CPT codes:  Therapeutic activities (83918)   12 minutes  1 unit(s)  Gait Training ([id number]) 13 minutes 1 unit(s)     Krista Godwin 36

## 2020-11-24 NOTE — PROGRESS NOTES
Charge nurse notified of patient's fever that is creeping up, now at 100.2 after tylenol was given. Vitals were assessed. Patient following commands still. Will continue to monitor the patient.     Electronically signed by Chyna Dickinson RN on 11/24/2020 at 2:59 AM

## 2020-11-24 NOTE — PROGRESS NOTES
Department of Internal Medicine  General Internal Medicine  Attending Progress Note      Subjective: The patient is sleepy and mildly confused. Had a low-grade temperature last night. Denies chest pain, angina, and dyspnea. Denies abdominal pain. Tolerating diet. No nausea or vomiting. Chest x-ray showing worsening of infiltrate bilaterally    Objective:  Pt is sleepy but arousable and according to the sitter she has been up in the morning eating breakfast well but she is weak needing 2  person assist with a walker. Family is declining taking the patient to a nursing home. BP (!) 140/74   Pulse 91   Temp 98.1 °F (36.7 °C) (Oral)   Resp 18   Ht 5' 5\" (1.651 m)   Wt 132 lb 11.2 oz (60.2 kg)   SpO2 94%   BMI 22.08 kg/m²     Head and Neck: normal atraumatic, no neck masses, normal thyroid, no jvd    Heart:  regular rate and rhythm, S1, S2 normal, no murmur, click, rub or gallop    Lungs:  chest with scattered harsh rhonchi and bibasilar rales no wheezing,symmetric air entry, no chest wall deformities or tenderness    Abd: soft, non-tender, without masses or organomegaly    Extrem:  No clubbing, cyanosis, or edema    Neuro: no focal neurological deficit     Skin: No rashes, lesions, or ecchymosis, no ulcers. Psych: No apparent anxiety, agitation, or depression.      Labs:   CBC with Differential:    Lab Results   Component Value Date    WBC 10.2 11/23/2020    RBC 3.64 11/23/2020    HGB 9.9 11/23/2020    HCT 31.4 11/23/2020     11/23/2020    MCV 86.3 11/23/2020    MCH 27.2 11/23/2020    MCHC 31.5 11/23/2020    RDW 15.1 11/23/2020    SEGSPCT 64 02/15/2013    LYMPHOPCT 12.9 11/23/2020    MONOPCT 9.8 11/23/2020    MYELOPCT 0.9 11/11/2018    BASOPCT 0.5 11/23/2020    MONOSABS 1.00 11/23/2020    LYMPHSABS 1.32 11/23/2020    EOSABS 0.16 11/23/2020    BASOSABS 0.05 11/23/2020     CMP:    Lab Results   Component Value Date     11/23/2020    K 3.9 11/23/2020    K 4.4 10/06/2020    CL 98

## 2020-11-25 NOTE — PROGRESS NOTES
Department of Internal Medicine  General Internal Medicine  Attending Progress Note      Subjective: The patient is awake and alert. No problems overnight. Denies chest pain, angina, and dyspnea. Denies abdominal pain. Tolerating diet. No nausea or vomiting. PT IS POSITIVE FOR COVID 19 CURRENTLY ON REMDESIVIR    Objective:  Pt oriented, alert, coherent and logical    BP (!) 170/83   Pulse 96   Temp 97.4 °F (36.3 °C) (Oral)   Resp 20   Ht 5' 5\" (1.651 m)   Wt 132 lb 4.8 oz (60 kg)   SpO2 96%   BMI 22.02 kg/m²     Head and Neck: normal atraumatic, no neck masses, normal thyroid, no jvd    Heart:  regular rate and rhythm, S1, S2 normal, no murmur, click, rub or gallop    Lungs:  Chest + INSP. bibasilar rales    Abd: soft, non-tender, without masses or organomegaly    Extrem:  No clubbing, cyanosis, or edema    Neuro: no focal neurological deficit     Skin: No rashes, lesions, or ecchymosis, no ulcers. Psych: No apparent anxiety, agitation, or depression.      Labs:   CBC:   Lab Results   Component Value Date    WBC 3.9 11/25/2020    RBC 4.35 11/25/2020    HGB 11.7 11/25/2020    HCT 36.9 11/25/2020    MCV 84.8 11/25/2020    MCH 26.9 11/25/2020    MCHC 31.7 11/25/2020    RDW 14.7 11/25/2020     11/25/2020    MPV 10.0 11/25/2020     CMP:    Lab Results   Component Value Date     11/25/2020    K 4.3 11/25/2020    K 4.4 10/06/2020    CL 94 11/25/2020    CO2 23 11/25/2020    BUN 21 11/25/2020    CREATININE 0.9 11/25/2020    GFRAA >60 11/25/2020    LABGLOM >60 11/25/2020    GLUCOSE 165 11/25/2020    GLUCOSE 158 10/03/2011    PROT 6.3 11/25/2020    LABALBU 2.7 11/25/2020    LABALBU 4.0 10/03/2011    CALCIUM 8.8 11/25/2020    BILITOT <0.2 11/25/2020    ALKPHOS 128 11/25/2020    AST 26 11/25/2020    ALT 16 11/25/2020     PT/INR:    Lab Results   Component Value Date    PROTIME 13.5 11/25/2020    INR 1.2 11/25/2020        CX-Rays:   Increasing bilateral pulmonary infiltrates/atelectasis Assessment:  COVID 19  C DIFF  PNEUMONIA  HYPONATREMIA    Principal Problem:    Pneumonia  Active Problems:    Diabetes mellitus type II, controlled ([facility]    Chronic obstructive pulmonary disease (HCC)    Altered mental status    Essential hypertension    Mixed hyperlipidemia    C. difficile colitis    Acute urinary tract infection    Fluid overload    Lethargic  Resolved Problems:    * No resolved hospital problems.  *      Plan:RESUME IV NS  CONTINUE CURRENT MEDS      See orders    Electronically signed by Priscila Roque MD on 11/25/2020 at 5:35 PM

## 2020-11-25 NOTE — PROGRESS NOTES
Physical Therapy    Physical Therapy Treatment Note    Room #:  4252/7094-52  Patient Name: Belkis Braun  YOB: 1940  MRN: [id number]    Referring Provider: Lon Heller MD     Date of Service: 11/25/2020    Evaluating Physical Therapist: Bobbi Truong, PT  #[id number]       Diagnosis: Pneumonia [J18.9] Admitted with nausea, vomiting, diarrhea        Patient Active Problem List   Diagnosis    Breast mass, right    DJD (degenerative joint disease) of knee    Knee pain    Iron (Fe) deficiency anemia    Diabetes mellitus type II, controlled ([address]    Chronic obstructive pulmonary disease (HCC)    Atelectasis    Hyponatremia    S/P total knee arthroplasty    Status post total bilateral knee replacement    Localized osteoarthrosis, lower leg    Failed total left knee replacement (HCC)    Altered mental status    Generalized weakness    Weakness of left lower extremity    Closed nondisplaced fracture of acromial end of right clavicle    Intertrochanteric fracture of right hip, closed, initial encounter ([address]    Essential hypertension    Mixed hyperlipidemia    Bronchospasm    Tachycardia    Traumatic arthritis of wrist    Sepsis secondary to cat scratch ([address]    Pneumonia    C. difficile colitis    Acute urinary tract infection    Fluid overload    Lethargic      Tentative placement recommendation: Subacute vs Home Health Physical Therapy if patient meets goals    Equipment recommendation: None      Prior Level of Function: Patient ambulated with supervision     Rehab Potential: good for baseline    Past medical history:   Past Medical History:   Diagnosis Date    Arthritis     joints, knees, back  djd    Cerebral artery occlusion with cerebral infarction ([address]     Chronic back pain     COPD (chronic obstructive pulmonary disease) ([address]     Diabetes mellitus ([address]     GERD (gastroesophageal reflux disease)     H/O cardiovascular stress test 11/12/2018    lexiscan stress Supine to sit: Moderate assist of 1    Sit to supine: Moderate assist of 1    Scooting: Moderate assist of 1     Rolling: Independent    Supine to sit: Independent    Sit to supine: Independent    Scooting: Independent     Transfers Sit to stand: Minimal assist of 1  From chair, Bedside commode and bed for safety and redirection Sit to stand: Moderate assist of 1; Patient was given instruction for sit to stand transfers regarding weight shifting, sequencing, and proper foot/hand placement to achieve an effective stand with proper knee extension and body mechanics. Height of bed raised  Sit to stand: Independent     Ambulation    3x20 feet using  wheeled walker with Minimal assist of 1   for balance and o2 line management Patient ambulated 15 ft x 2   using wheeled walkerwith Moderate assist of 1. Verbal cues were given for safety, upright posture, increased base of support, walker management, decreased space from walker, and obstacle negotiation. Assist to weight shift . Pt with posterior lean with turns. Cues for safety and upright posture   75 feet using  wheeled walker with Supervision     ROM Within functional limits       Strength BUE:  refer to OT eval  RLE:  4/5  LLE:  4/5   Increase strength in affected mm groups by 1/3 grade   Balance Sitting EOB:  good    Dynamic Standing:  fair   Sitting EOB: fair     Dynamic Standing: poorusing wheeled walker   Sitting EOB:  good    Dynamic Standing: good       Patient is Alert & Oriented x person, place, time and situation and follows directions    Sensation: Patient denies numbness and tingling     Edema: no   Endurance: fair       Patient education  Patient educated on role of Physical Therapy, risks of immobility, safety and plan of care. Patient was educated and facilitated on techniques to increase safety and independence with bed mobility, balance, functional transfers, and functional mobility.  Educated on benefits of mobility     Patient response to education: Pt verbalized understanding Pt demonstrated skill Pt requires further education in this area   No Partial Yes      Treatment:  Patient practiced and was instructed/facilitated in the following treatment:   Patient transferred to edge of bed and sat up x 20  minutes to increase dynamic sitting balance and activity tolerance. Patient performed sit to stand transfers and gait training. Patient was returned to bed at end of treatment. Pt took pills at side of bed. Encouragement given throughout for increased activity      Therapeutic Exercises: not performed  At end of session, patient in bed with alarm,  call light and phone within reach, all lines and tubes intact, nursing notified. Patient would benefit from continued skilled Physical Therapy to improve functional independence and quality of life. Patient's/ family goals   home        ASSESSMENT: Patient required moderate  assistance for all functional mobility   Decreased coordination, strength and endurance limiting function. At risk for falls. Fatigues quickly. Cues throughout for safety and technique . Frequent rests provided     Plan of Care:   -Standing Balance: Perform strengthening exercises in standing to promote motor control with or without upper extremity support   -Transfers: Provide instruction on proper hand and foot position for adequate transfer of weight onto lower extremities and use of gait device  -Gait: Gait training and Standing activities to improve: base of support, weight shift, weight bearing    -Endurance: Utilize Supervised activities to increase level of endurance to allow for safe functional mobility including transfers and gait     Patient and or family understand(s) diagnosis, prognosis, and plan of care. Frequency of treatments: Patient will be seen daily.        Time in: 850  Time out: 920    Total Treatment Time: 30 minutes    CPT codes:  Therapeutic activities (71439)   15 minutes  1 unit(s)  Gait Training (72718) 15 minutes 1 unit(s)     Mario Alberto [place], [place]   [id number]

## 2020-11-25 NOTE — PLAN OF CARE
Problem: Falls - Risk of:  Goal: Will remain free from falls  Description: Will remain free from falls  11/25/2020 0041 by Chasity Beltran  Outcome: Met This Shift     Problem: Falls - Risk of:  Goal: Absence of physical injury  Description: Absence of physical injury  11/25/2020 0041 by Chasity Beltran  Outcome: Met This Shift     Problem: Skin Integrity:  Goal: Will show no infection signs and symptoms  Description: Will show no infection signs and symptoms  11/25/2020 0041 by Chasity Beltran  Outcome: Met This Shift     Problem: Skin Integrity:  Goal: Absence of new skin breakdown  Description: Absence of new skin breakdown  11/25/2020 0041 by Chasity Beltran  Outcome: Met This Shift     Problem: Pain:  Goal: Pain level will decrease  Description: Pain level will decrease  11/25/2020 0041 by Chasity Beltran  Outcome: Met This Shift     Problem: Pain:  Goal: Control of acute pain  Description: Control of acute pain  11/25/2020 0041 by Chasity Beltran  Outcome: Met This Shift     Problem: Pain:  Goal: Control of chronic pain  Description: Control of chronic pain  11/25/2020 0041 by Chasity Beltran  Outcome: Met This Shift     Problem: Airway Clearance - Ineffective  Goal: Achieve or maintain patent airway  11/25/2020 0041 by Chasity Beltran  Outcome: Met This Shift     Problem: Gas Exchange - Impaired  Goal: Absence of hypoxia  11/25/2020 0041 by Chasity Beltran  Outcome: Met This Shift     Problem: Gas Exchange - Impaired  Goal: Promote optimal lung function  11/25/2020 0041 by Chasity Beltran  Outcome: Met This Shift     Problem: Breathing Pattern - Ineffective  Goal: Ability to achieve and maintain a regular respiratory rate  11/25/2020 0041 by Chasity Beltran  Outcome: Met This Shift     Problem: Body Temperature -  Risk of, Imbalanced  Goal: Ability to maintain a body temperature within defined limits  11/25/2020 0041 by Chasity Beltran  Outcome: Met This Shift     Problem:  Body Temperature -  Risk of, Imbalanced  Goal: Will regain or maintain usual level of consciousness  11/25/2020 0041 by Veronica Amador  Outcome: Met This Shift     Problem: Isolation Precautions - Risk of Spread of Infection  Goal: Prevent transmission of infection  11/25/2020 0041 by Veronica Amador  Outcome: Met This Shift     Problem: Nutrition Deficits  Goal: Optimize nutrtional status  11/25/2020 0041 by Veronica Amador  Outcome: Met This Shift     Problem: Risk for Fluid Volume Deficit  Goal: Maintain normal heart rhythm  11/25/2020 0041 by Veronica Amador  Outcome: Met This Shift     Problem: Risk for Fluid Volume Deficit  Goal: Maintain absence of muscle cramping  11/25/2020 0041 by Veronica Amador  Outcome: Met This Shift     Problem: Loneliness or Risk for Loneliness  Goal: Demonstrate positive use of time alone when socialization is not possible  11/25/2020 0041 by Veronica Amador  Outcome: Met This Shift     Problem: Fatigue  Goal: Verbalize increase energy and improved vitality  11/25/2020 0041 by Veronica Amador  Outcome: Met This Shift     Problem: Patient Education: Go to Patient Education Activity  Goal: Patient/Family Education  11/25/2020 0041 by Veronica Amador  Outcome: Met This Shift

## 2020-11-25 NOTE — PROGRESS NOTES
[address] [place] [address] [facility]  KELVIN  Progress Note    Face to face encounter  CC:confused- has sitter     SUBJECTIVE:  Patient is tolerating medications. No reported adverse drug reactions. ROS:no fevers. No chest pain. No rash. Confused. Has sitter -sitting outside of room   Fevers ok this am  On nasal canula- 2 liters   Awake-confused  CARLOS- reviewed     Medications:  Scheduled Meds:   zinc gluconate  50 mg Oral Daily    dexamethasone  6 mg Intravenous Q24H    enoxaparin  30 mg Subcutaneous BID    remdesivir IVPB  100 mg Intravenous Q24H    vancomycin  125 mg Oral 4 times per day    lactobacillus  1 capsule Oral Q12H    B-complex with vitamin C  1 tablet Oral Daily    linezolid  600 mg Oral 2 times per day    amLODIPine  5 mg Oral Daily    DULoxetine  40 mg Oral Nightly    losartan  50 mg Oral Nightly    metoprolol succinate  25 mg Oral Daily    mirabegron  25 mg Oral Nightly    montelukast  10 mg Oral Nightly    ipratropium-albuterol  3 mL Inhalation 4x daily    levothyroxine  137 mcg Oral Daily    warfarin  7.5 mg Oral QPM    clotrimazole-betamethasone   Topical BID    Arformoterol Tartrate  15 mcg Nebulization BID    And    budesonide  0.5 mg Nebulization BID     Continuous Infusions:   dextrose       PRN Meds:sodium chloride flush, haloperidol lactate, acetaminophen **OR** acetaminophen, sodium chloride, guaiFENesin, benzonatate, ondansetron, glucose, dextrose, glucagon (rDNA), dextrose  OBJECTIVE:  Patient Vitals for the past 24 hrs:   BP Temp Temp src Pulse Resp SpO2 Height Weight   11/25/20 0800 132/72 98.6 °F (37 °C) Oral 98 18 97 % -- --   11/25/20 0600 -- -- -- -- -- -- 5' 5\" (1.651 m) 132 lb 4.8 oz (60 kg)   11/25/20 0223 -- -- -- 115 -- -- -- --   11/24/20 2345 138/67 98.3 °F (36.8 °C) Oral 107 24 95 % -- --   11/24/20 1715 136/65 98.1 °F (36.7 °C) Oral 103 20 94 % -- --     Constitutional: The patient is awake - tired today- confused. Resting in bed.  No acute distress. Skin: Warm and dry. No rashes were noted. Head: Eyes show round, and reactive pupils. No jaundice. Chest: No use of accessory muscles to breathe.  ctab ant  Cardiovascular: S1 and S2 are rhythmic and regular. Abdomen: soft  Benign to palpation. No masses felt. Distended   Extremities:   no edema. PIV    Wound Care Documentation:  Wound 11/18/20 Back center (Active)   Dressing Status Clean;Dry; Intact 11/21/20 0820   Dressing/Treatment Foam 11/21/20 0820   Wound Length (cm) 1.7 cm 11/18/20 0140   Wound Width (cm) 1 cm 11/18/20 0140   Wound Surface Area (cm^2) 1.7 cm^2 11/18/20 0140   Drainage Amount None 11/19/20 0800   Number of days: 3       Laboratory and Tests Review:  Lab Results   Component Value Date    WBC 3.9 (L) 11/25/2020    WBC 10.3 11/24/2020    WBC 10.2 11/23/2020    HGB 11.7 11/25/2020    HCT 36.9 11/25/2020    MCV 84.8 11/25/2020     11/25/2020     Lab Results   Component Value Date    NEUTROABS 2.65 11/25/2020    NEUTROABS 7.23 11/24/2020    NEUTROABS 7.62 (H) 11/23/2020     Lab Results   Component Value Date    CRP 2.8 (H) 11/25/2020    CRP 4.1 (H) 11/24/2020    CRP 14.9 (H) 12/29/2019     Lab Results   Component Value Date    SEDRATE 86 (H) 12/29/2019    SEDRATE 84 (H) 12/29/2019    SEDRATE 86 (H) 12/27/2019     Lab Results   Component Value Date    ALT 16 11/25/2020    AST 26 11/25/2020    ALKPHOS 128 (H) 11/25/2020    BILITOT <0.2 11/25/2020     Lab Results   Component Value Date     11/25/2020    K 4.3 11/25/2020    K 4.4 10/06/2020    CL 94 11/25/2020    CO2 23 11/25/2020    BUN 21 11/25/2020    CREATININE 0.9 11/25/2020    GFRAA >60 11/25/2020    LABGLOM >60 11/25/2020    GLUCOSE 165 11/25/2020    GLUCOSE 158 10/03/2011    PROT 6.3 11/25/2020    LABALBU 2.7 11/25/2020    LABALBU 4.0 10/03/2011    CALCIUM 8.8 11/25/2020    BILITOT <0.2 11/25/2020    ALKPHOS 128 11/25/2020    AST 26 11/25/2020    ALT 16 11/25/2020     Radiology:  Reviewed      Microbiology: 11/17/2020- blood cx- enterococcus faecium and CONS  11/20/2020- blood cx-  No growth to date   C.diff- ++ detected  Urine cx- E.coli     ASSESSMENT:  · VRE Enterococcus faecium bacteremia-- ? Source   · C.diff- recurrent   · Leukopenia   · Fevers   · History of falls- with c-spine fracture   · Bacteruria with E.coli   · Diarrhea add probiotics    PLAN:  · Continue zyvox   - 2 weeks  · Follow cbc and platelets - platelets- ok- monitor WBC  · Continue PO vancomycin - continue for 4 weeks  · For CARLOS today - follow results   · On remdesivir- day #2   · On decadron   · Repeat blood cultures  ngtd  · Check cultures  · Monitor labs    Electronically signed by CHRISTAL Gonzalez CNS on 11/25/2020 at 11:43 AM    Phone [phone]  Fax [phone]    As above    This is a face to face encounter with Tara Duong on 11/25/20. I have performed and participated in the history, exam, medical decision making, and  POC  with the NURSE PRACTITIONER and provided the instruction and education regarding this patient's care. Imaging and labs were reviewed per medical records and any ID pertinent labs were addressed with the patient. covid + with leukopenia  vre bacteremia  Cdiff/diarrhea  Vitamin d deficiency    remdesivir 100 mg in sodium chloride 0.9 % 250 mL IVPB, Q24H  vancomycin (VANCOCIN) oral solution 125 mg, 4 times per day  lactobacillus (CULTURELLE) capsule 1 capsule, Q12H  B-complex with vitamin C tablet 1 tablet, Daily  linezolid (ZYVOX) tablet 600 mg, 2 times per day  clotrimazole-betamethasone (LOTRISONE) cream, BID    Spoke with DR Willard      Thank you for involving me in the care of Tara Duong. Please do not hesitate to call for any questions or concerns.     Electronically signed by Santhosh Arguelles MD on 11/25/2020 at 9:16 PM    Phone [phone]  Fax [phone]

## 2020-11-25 NOTE — CARE COORDINATION
11/25/2020 1432 CM note: COMPLEX CASE:BARRIERS TO DC : 1:1 Status and COVID POSITIVE    COVID POSITIVE 11/24/20.  ( NEGATIVE COVID TESTING 11/17/20 and 11/18/20 per resp panel). Pt in isolation for C DIFF. Pt is 1:1,resides at home with family and has 24/7 supervision. Spoke with pt's daughter Dory Faria by phone, ACP completed. Pt has home o2 2l NC through [address] and nebulizer. Pt is active with [facility] and [facility] orders received. Sharon reports family have concerns of COVID exposure to pt's spouse. Sharon requests SW/CM to investigate SNF options regardless of SNF location. Sharon informed of SNF placement barriers of +COVID/1:1 status. Sharon also informed of restricted SNF visitation. Sharon voiced understanding of above and agreeable to any SNF facility that can accept pt. Referral placed to Nura Richards, for any of [address] [place] [address] Facilities-will review. Per Amber,for [facility] facilities, pt WILL NEED to be sitter free D48 hours, PRECERT waived. Sharon will discuss barriers to SNF placement with family and other possible discharge options with her family in event SNF placement cannot be secured. SW/CM to follow up Friday 11/27/20. JAIME Puentes RN

## 2020-11-25 NOTE — CARE COORDINATION
Advance Care Planning     Advance Care Planning Activator (Inpatient)  Conversation Note      Date of ACP Conversation: 2020    Conversation Conducted with: Lona Cuevas  (patient confused)    ACP Activator: [address] Decision Maker:     Current Designated Health Care Decision Maker:   Primary Decision Maker: Asia Bonner - Child - [phone]    Secondary Decision Maker: Curtis Turcios Child [phone]  Care Preferences    Ventilation: \"If you were in your present state of health and suddenly became very ill and were unable to breathe on your own, what would your preference be about the use of a ventilator (breathing machine) if it were available to you? \"      Would the patient desire the use of ventilator (breathing machine)?: yes    \"If your health worsens and it becomes clear that your chance of recovery is unlikely, what would your preference be about the use of a ventilator (breathing machine) if it were available to you? \"     Would the patient desire the use of ventilator (breathing machine)?: Yes      Resuscitation  \"CPR works best to restart the heart when there is a sudden event, like a heart attack, in someone who is otherwise healthy. Unfortunately, CPR does not typically restart the heart for people who have serious health conditions or who are very sick. \"    \"In the event your heart stopped as a result of an underlying serious health condition, would you want attempts to be made to restart your heart (answer \"yes\" for attempt to resuscitate) or would you prefer a natural death (answer \"no\" for do not attempt to resuscitate)? \" yes       [] Yes   [x] No   Educated Patient / Rosina Apgar regarding differences between Advance Directives and portable DNR orders.     Length of ACP Conversation in minutes:      Conversation Outcomes:  [x] ACP discussion completed  [] Existing advance directive reviewed with patient; no changes to patient's previously recorded wishes  [] New Advance Directive completed  [] Portable Do Not Rescitate prepared for Provider review and signature  [] POLST/POST/MOLST/MOST prepared for Provider review and signature      Follow-up plan:    [] Schedule follow-up conversation to continue planning  [] Referred individual to Provider for additional questions/concerns   [x] Advised patient/agent/surrogate to review completed ACP document and update if needed with changes in condition, patient preferences or care setting    [x] This note routed to one or more involved healthcare providers

## 2020-11-26 NOTE — PROGRESS NOTES
[address] [place] [address] [facility]  KELVIN  Progress Note    Face to face encounter  CC:confused- has sitter     SUBJECTIVE:  Patient is tolerating medications. No reported adverse drug reactions. ROS:no fevers. No chest pain. No rash. Confused. Still with diarrhea. Has sitter - no fevers. Eating breakfast in chair this am- looks well. Fevers ok this am  On nasal canula- 2 liters   Awake-confused    Medications:  Scheduled Meds:   Vitamin D  3,000 Units Oral Daily    zinc gluconate  50 mg Oral Daily    dexamethasone  6 mg Intravenous Q24H    remdesivir IVPB  100 mg Intravenous Q24H    vancomycin  125 mg Oral 4 times per day    lactobacillus  1 capsule Oral Q12H    B-complex with vitamin C  1 tablet Oral Daily    linezolid  600 mg Oral 2 times per day    amLODIPine  5 mg Oral Daily    DULoxetine  40 mg Oral Nightly    losartan  50 mg Oral Nightly    metoprolol succinate  25 mg Oral Daily    mirabegron  25 mg Oral Nightly    montelukast  10 mg Oral Nightly    ipratropium-albuterol  3 mL Inhalation 4x daily    levothyroxine  137 mcg Oral Daily    warfarin  7.5 mg Oral QPM    clotrimazole-betamethasone   Topical BID    Arformoterol Tartrate  15 mcg Nebulization BID    And    budesonide  0.5 mg Nebulization BID     Continuous Infusions:   sodium chloride 50 mL/hr at 11/25/20 1800    dextrose       PRN Meds:sodium chloride flush, haloperidol lactate, acetaminophen **OR** acetaminophen, sodium chloride, guaiFENesin, benzonatate, ondansetron, glucose, dextrose, glucagon (rDNA), dextrose  OBJECTIVE:  Patient Vitals for the past 24 hrs:   BP Temp Temp src Pulse Resp SpO2   11/26/20 0830 120/66 97.7 °F (36.5 °C) Oral 85 18 99 %   11/26/20 0138 -- -- -- 92 -- --   11/25/20 2115 (!) 164/78 98.2 °F (36.8 °C) Oral 76 20 98 %   11/25/20 1645 (!) 170/83 97.4 °F (36.3 °C) Oral 96 20 96 %     Constitutional: The patient is awake - sitting up in chair- looks well today.  Eating breakfast.    Skin: enterococcus faecium and CONS  11/20/2020- blood cx-  No growth to date   C.diff- ++ detected  Urine cx- E.coli     ASSESSMENT:  · VRE Enterococcus faecium bacteremia-- ? Source   · C.diff- recurrent   · Leukopenia   · Fevers   · History of falls- with c-spine fracture   · Bacteruria with E.coli   · Diarrhea     PLAN:  · Continue zyvox   - 2 weeks  · Follow cbc and platelets - platelets- ok- monitor WBC  · Continue PO vancomycin - continue for 4 weeks   · On probiotics   · On remdesivir- day #3   · On decadron   · Repeat blood cultures  ngtd  · Check cultures  · Monitor labs    Electronically signed by CHRISTAL Sutherland CNS on 11/26/2020 at 9:03 AM    Phone [phone]  Fax [phone]    As above    This is a face to face encounter with Jael Terrell on 11/26/20. I have performed and participated in the history, exam, medical decision making, and  POC  with the NURSE PRACTITIONER and provided the instruction and education regarding this patient's care. Imaging and labs were reviewed per medical records and any ID pertinent labs were addressed with the patient. The patient was educated about the diagnosis, prognosis, indications, risks and benefits of treatment. Pt had the opportunity to ask questions. All questions were answered. Pt has sitter more awake on 2L  Getting blood draws  leukopenia follow up labs  covid +  vre bacteremia  Cdiff/diarrhea  Vitamin d deficiency    Cont rx  Follow labs /resp status  Thank you for involving me in the care of Jael Terrell. Please do not hesitate to call for any questions or concerns.     Electronically signed by Juliana Aquino MD on 11/26/2020 at 12:19 PM    Phone [phone]  Fax [phone]

## 2020-11-26 NOTE — PROGRESS NOTES
Subjective: The patient is awake and alert. No problems overnight. Denies chest pain, angina, and dyspnea. Denies abdominal pain. Tolerating diet. No nausea or vomiting. Maintaining O2 saturation 96 to 98% on 2 L nasal cannula.   Social service working on placement    Objective:  Pt oriented, alert, coherent and logical    /66   Pulse 85   Temp 97.7 °F (36.5 °C) (Oral)   Resp 18   Ht 5' 5\" (1.651 m)   Wt 132 lb 4.8 oz (60 kg)   SpO2 99%   BMI 22.02 kg/m²         CBC:   Lab Results   Component Value Date    WBC 3.9 11/25/2020    RBC 4.35 11/25/2020    HGB 11.7 11/25/2020    HCT 36.9 11/25/2020    MCV 84.8 11/25/2020    MCH 26.9 11/25/2020    MCHC 31.7 11/25/2020    RDW 14.7 11/25/2020     11/25/2020    MPV 10.0 11/25/2020     CMP:    Lab Results   Component Value Date     11/25/2020    K 4.3 11/25/2020    K 4.4 10/06/2020    CL 94 11/25/2020    CO2 23 11/25/2020    BUN 21 11/25/2020    CREATININE 0.9 11/25/2020    GFRAA >60 11/25/2020    LABGLOM >60 11/25/2020    GLUCOSE 165 11/25/2020    GLUCOSE 158 10/03/2011    PROT 6.3 11/25/2020    LABALBU 2.7 11/25/2020    LABALBU 4.0 10/03/2011    CALCIUM 8.8 11/25/2020    BILITOT <0.2 11/25/2020    ALKPHOS 128 11/25/2020    AST 26 11/25/2020    ALT 16 11/25/2020     PT/INR:    Lab Results   Component Value Date    PROTIME 18.1 11/26/2020    INR 1.6 11/26/2020     Fibrinogen Level:  No components found for: FIB   No results found for: AMYLASE  Lab Results   Component Value Date    LIPASE 30 10/18/2017       Assessment:  COVID-19  Patient maintained baseline oxygen saturation  Hyponatremia    Principal Problem:    Pneumonia  Active Problems:    Diabetes mellitus type II, controlled ([address]    Chronic obstructive pulmonary disease (HCC)    Altered mental status    Essential hypertension    Mixed hyperlipidemia    C. difficile colitis    Acute urinary tract infection    Fluid overload    Lethargic  Resolved Problems:    * No resolved hospital problems.  *      Plan: Continue remdesivir            Social service to pursue placement options  See orders    Cassandra Friend  3:52 PM  11/26/2020

## 2020-11-26 NOTE — PLAN OF CARE
Problem: Falls - Risk of:  Goal: Will remain free from falls  Description: Will remain free from falls  Outcome: Met This Shift  Goal: Absence of physical injury  Description: Absence of physical injury  Outcome: Met This Shift     Problem: Skin Integrity:  Goal: Will show no infection signs and symptoms  Description: Will show no infection signs and symptoms  Outcome: Met This Shift  Goal: Absence of new skin breakdown  Description: Absence of new skin breakdown  Outcome: Met This Shift     Problem: Pain:  Goal: Pain level will decrease  Description: Pain level will decrease  Outcome: Met This Shift  Goal: Control of acute pain  Description: Control of acute pain  Outcome: Met This Shift     Problem: Airway Clearance - Ineffective  Goal: Achieve or maintain patent airway  Outcome: Met This Shift     Problem: Gas Exchange - Impaired  Goal: Absence of hypoxia  Outcome: Met This Shift  Goal: Promote optimal lung function  Outcome: Met This Shift     Problem: Breathing Pattern - Ineffective  Goal: Ability to achieve and maintain a regular respiratory rate  Outcome: Met This Shift     Problem:  Body Temperature -  Risk of, Imbalanced  Goal: Ability to maintain a body temperature within defined limits  Outcome: Met This Shift  Goal: Will regain or maintain usual level of consciousness  Outcome: Met This Shift  Goal: Complications related to the disease process, condition or treatment will be avoided or minimized  Outcome: Met This Shift     Problem: Isolation Precautions - Risk of Spread of Infection  Goal: Prevent transmission of infection  Outcome: Met This Shift     Problem: Nutrition Deficits  Goal: Optimize nutrtional status  Outcome: Met This Shift     Problem: Risk for Fluid Volume Deficit  Goal: Maintain normal heart rhythm  Outcome: Met This Shift  Goal: Maintain absence of muscle cramping  Outcome: Met This Shift  Goal: Maintain normal serum potassium, sodium, calcium, phosphorus, and pH  Outcome: Met This Shift     Problem: Loneliness or Risk for Loneliness  Goal: Demonstrate positive use of time alone when socialization is not possible  Outcome: Met This Shift     Problem: Fatigue  Goal: Verbalize increase energy and improved vitality  Outcome: Met This Shift     Problem: Patient Education: Go to Patient Education Activity  Goal: Patient/Family Education  Outcome: Met This Shift

## 2020-11-27 NOTE — PLAN OF CARE
Problem: Falls - Risk of:  Goal: Will remain free from falls  Description: Will remain free from falls  Outcome: Met This Shift     Problem: Skin Integrity:  Goal: Will show no infection signs and symptoms  Description: Will show no infection signs and symptoms  Outcome: Met This Shift  Goal: Absence of new skin breakdown  Description: Absence of new skin breakdown  Outcome: Met This Shift     Problem: Pain:  Goal: Pain level will decrease  Description: Pain level will decrease  Outcome: Met This Shift  Goal: Control of acute pain  Description: Control of acute pain  Outcome: Met This Shift  Goal: Control of chronic pain  Description: Control of chronic pain  Outcome: Met This Shift

## 2020-11-27 NOTE — PROGRESS NOTES
Physical Therapy    Physical Therapy Treatment Note    Room #:  5244/3634-99  Patient Name: Josselin Juárez  YOB: 1940  MRN: [id number]    Referring Provider: Arslan Klein MD     Date of Service: 11/27/2020    Evaluating Physical Therapist: Sindi Torres, PT  #[id number]       Diagnosis: Pneumonia [J18.9] Admitted with nausea, vomiting, diarrhea        Patient Active Problem List   Diagnosis    Breast mass, right    DJD (degenerative joint disease) of knee    Knee pain    Iron (Fe) deficiency anemia    Diabetes mellitus type II, controlled ([address]    Chronic obstructive pulmonary disease (HCC)    Atelectasis    Hyponatremia    S/P total knee arthroplasty    Status post total bilateral knee replacement    Localized osteoarthrosis, lower leg    Failed total left knee replacement (HCC)    Altered mental status    Generalized weakness    Weakness of left lower extremity    Closed nondisplaced fracture of acromial end of right clavicle    Intertrochanteric fracture of right hip, closed, initial encounter ([address]    Essential hypertension    Mixed hyperlipidemia    Bronchospasm    Tachycardia    Traumatic arthritis of wrist    Sepsis secondary to cat scratch ([address]    Pneumonia    C. difficile colitis    Acute urinary tract infection    Fluid overload    Lethargic      Tentative placement recommendation: Subacute vs Home Health Physical Therapy if patient meets goals    Equipment recommendation: None      Prior Level of Function: Patient ambulated with supervision     Rehab Potential: good for baseline    Past medical history:   Past Medical History:   Diagnosis Date    Arthritis     joints, knees, back  djd    Cerebral artery occlusion with cerebral infarction ([address]     Chronic back pain     COPD (chronic obstructive pulmonary disease) ([address]     Diabetes mellitus ([address]     GERD (gastroesophageal reflux disease)     H/O cardiovascular stress test 11/12/2018    lexiscan stress test    Hyperlipidemia     Thyroid disease      Past Surgical History:   Procedure Laterality Date    APPENDECTOMY      BREAST SURGERY      cyst     CHOLECYSTECTOMY      COLONOSCOPY      ENDOSCOPY, COLON, DIAGNOSTIC      HYSTERECTOMY      JOINT REPLACEMENT      left knee    KNEE ARTHROPLASTY Right 1/15/2014    TOTAL KNEE ARTHROPLASTY    THYROIDECTOMY      TRANSESOPHAGEAL ECHOCARDIOGRAM N/A 11/23/2020    TRANSESOPHAGEAL ECHOCARDIOGRAM performed by Jessica Fraser MD at [facility] ENDOSCOPY     Precautions: Activity as tolerated, falls, alarm and dementia ,  covid +, telesitter    SUBJECTIVE:  Social history: Patient lives with spouse and dtr who states 24/7 in her house in a ranch home with 1 step to enter   Equipment owned: Cane, Wheeled Walker, Bedside commode, Shower chair and O2,       [address]   How much difficulty turning over in bed?: A Little  How much difficulty sitting down on / standing up from a chair with arms?: A Little  How much difficulty moving from lying on back to sitting on side of bed?: A Little  How much help from another person moving to and from a bed to a chair?: A Little  How much help from another person needed to walk in hospital room?: A Little  How much help from another person for climbing 3-5 steps with a railing?: A Lot  AM-PAC Inpatient Mobility Raw Score : 17  AM-PAC Inpatient T-Scale Score : 42.13  Mobility Inpatient CMS 0-100% Score: 50.57  Mobility Inpatient CMS G-Code Modifier : CK    Nursing cleared patient for PT treatment. Pt agreed to out of bed with encouragement   OBJECTIVE;   Initial Evaluation  Date: 11/18/20 Treatment Date:    11/27/2020   Short Term/ Long Term   Goals   Was pt agreeable to Eval/treatment?  Yes   Yes To be met in 3 days   Pain level   0/10    0/10    Bed Mobility    Rolling: Supervision     Supine to sit: Supervision     Sit to supine: Supervision     Scooting: Supervision    Rolling: Minimal assist of 1 with cues for technique     Supine to sit: Minimal assist of 1    Sit to supine: Minimal assist of 1    Scooting: Minimal assist of 1     Rolling: Independent    Supine to sit: Independent    Sit to supine: Independent    Scooting: Independent     Transfers Sit to stand: Minimal assist of 1  From chair, Bedside commode and bed for safety and redirection Sit to stand: Minimal assist of 1; Patient was given instruction for sit to stand transfers regarding weight shifting, sequencing, and proper foot/hand placement to achieve an effective stand with proper knee extension and body mechanics. Height of bed raised  Sit to stand: Independent     Ambulation    3x20 feet using  wheeled walker with Minimal assist of 1   for balance and o2 line management Patient ambulated 12 feet  using wheeled walkerwith Minimal assist of 1. Verbal cues were given for safety, upright posture, increased base of support, walker management, decreased space from walker, and obstacle negotiation. 75 feet using  wheeled walker with Supervision     ROM Within functional limits       Strength BUE:  refer to OT eval  RLE:  4/5  LLE:  4/5   Increase strength in affected mm groups by 1/3 grade   Balance Sitting EOB:  good    Dynamic Standing:  fair   Sitting EOB: good     Dynamic Standing: fair using wheeled walker   Sitting EOB:  good    Dynamic Standing: good       Patient is Alert & Oriented x person, place, time and situation and follows directions    Sensation: Patient denies numbness and tingling     Edema: no   Endurance: fair       Patient education  Patient educated on role of Physical Therapy, risks of immobility, safety and plan of care. Patient was educated and facilitated on techniques to increase safety and independence with bed mobility, balance, functional transfers, and functional mobility.  Educated on benefits of mobility     Patient response to education:   Pt verbalized understanding Pt demonstrated skill Pt requires further education in this area   No Partial Yes      Treatment:  Patient practiced and was instructed/facilitated in the following treatment:   Patient transferred to edge of bed and sat up x 10 minutes to increase dynamic sitting balance and activity tolerance. Patient performed sit to stand transfers and gait training. Patient was returned to bed at end of treatment. Pt performed supine exercises. Therapeutic Exercises: ankle pumps, quad sets, heel slide, hip abduction/adduction and straight leg raise, x 10 reps, AROM. Verbal/tactile cues for technique. At end of session, patient in bed with alarm,  call light and phone within reach, all lines and tubes intact, nursing notified. Patient would benefit from continued skilled Physical Therapy to improve functional independence and quality of life. Patient's/ family goals   home        ASSESSMENT: Patient required moderate  assistance for all functional mobility   Decreased strength and endurance limiting distance and function. At risk for falls. Fatigues quickly. Cues throughout for safety and technique. Pt on 3L and stayed above 90% throughout tx session. Plan of Care:   -Standing Balance: Perform strengthening exercises in standing to promote motor control with or without upper extremity support   -Transfers: Provide instruction on proper hand and foot position for adequate transfer of weight onto lower extremities and use of gait device  -Gait: Gait training and Standing activities to improve: base of support, weight shift, weight bearing    -Endurance: Utilize Supervised activities to increase level of endurance to allow for safe functional mobility including transfers and gait     Patient and or family understand(s) diagnosis, prognosis, and plan of care. Frequency of treatments: Patient will be seen daily.        Time in: 1:20  Time out: 1:44    Total Treatment Time: 24 minutes    CPT codes:  Therapeutic activities (01991)   14 minutes  1 unit(s)  Therapeutic exercises (42068)   10 minutes  1 unit(s)     Helen Sylvester  \[facility] [place] [facility]\""  LIC # [id number]

## 2020-11-27 NOTE — PROGRESS NOTES
OT BEDSIDE TREATMENT NOTE      Date:2020  Patient Name: Marvin Ring  MRN: [id number]  : 1940  Room: [address] [place] [address]     Referring Provider: Trenton Garay MD      Evaluating OT: Josh Byrd OTR/L #[id number]     AM-PAC Daily Activity Raw Score: 15/24     Recommended Placement: Subacute rehab  Recommended Adaptive Equipment: TBD      Diagnosis:   1. Pneumonia due to organism    2. Nausea vomiting and diarrhea    3. Hyponatremia    4. Acute cystitis without hematuria          Surgery: N/A       Pertinent Medical History:    Past Medical History        Past Medical History:   Diagnosis Date    Arthritis       joints, knees, back  djd    Cerebral artery occlusion with cerebral infarction (HCC)      Chronic back pain      COPD (chronic obstructive pulmonary disease) (HCC)      Diabetes mellitus (HCC)      GERD (gastroesophageal reflux disease)      H/O cardiovascular stress test 2018     lexiscan stress test    Hyperlipidemia      Thyroid disease           Precautions:  Falls, alarm, confused, poor historian, c diff/contact, incontinent, O2 (2L)      Home Living: Pt unable to provide consistent information.     Prior Level of Function: Pt reporting she is independent with ADLs , her daughter assists with IADLs; ambulated without AD, 2L O2  Occupation: Retired      Pain Level: -/10  Cognition: A&O: 1/4; Follows 1 step directions with tactile cuing for initiation. Memory:  Poor              Sequencing:  Poor              Problem solving:  Poor              Judgement/safety:  Poor                Functional Assessment:    Initial Eval Status  Date: 20 Treatment Status  Date: 20 STGs = LTGs  Time frame: 5-7 days   Feeding Modified [place]   Assist with set-up,opening containers.   n/t      Grooming Moderate Assist   sitting in chair, poor initiation/sequencing, requiring tactile cuing MIN A seated EOB requiring assist 2* to fatigue  Minimal Assist    UB Dressing Moderate Assist   Don/doff gown while sitting EOB, assist/cuing on sequencing, balance. MIN A to [facility] gown  Minimal Assist    LB Dressing Maximal Assist   Don/doff depends  Max assist/cuing for sequencing, threading feet, and balance upon standing to pull up brief. MOD A to ella/doff socks seated EOB using cross over method requiring assist 2* to fatigue  Moderate Assist    Bathing Maximal Assist     n/t  Moderate Assist    Toileting Maximal Assist   Pt incontinent of stool and urine. Max assist for hygiene while standing. Cuing on safety. N/t  Moderate Assist    Bed Mobility  Supine to sit: Moderate Assist   Sit to supine: Moderate Assist of 2  Assist with intiation of BLE out of bed MOD A supine<>sit requiring assist to bring B LE in/out of bed v/c's for hand placement  Supine to sit: Minimal Assist   Sit to supine: Minimal Assist    Functional Transfers Moderate Assist   Sit<>stand  Bed, chair  Max cuing on hand placement, body mechanics. MOD A sit<>stand from EOB to w/w v/c's for hand placement and upright posture  Minimal Assist    Functional Mobility Moderate Assist of 2  HHA  Several steps to/from chair  Max tactile cuing to weight shift/take steps, sequecing MIN A with w/w side stepping to HOB less than house hold distances noting pt fatigue quickly   Minimal Assist    Balance Sitting:     Static:  fair    Dynamic:fair  Standing: fair Sitting:   Static: SBA   Dynamic: SBA   Standing: MIN A with w/w   Sitting:     Static:  Good    Dynamic:Good  Standing: fair+   Activity Tolerance Fair- Fair- pt fatigues quickly completing light ADL tasks  Fair+   Visual/  Perceptual Glasses: Yes   WFL             Comments: Upon arrival pt supine in bed requiring max v/c's for encouragement to participate in therapy session.  Pt educated with regards to bed mobility, functional transfers, functional mobility, hand placement, walker safety, LE/UE dressing techniques, importance of increased activity/participation in therapy. At end of session pt returned to supine in bed all lines and tubes intact, call light within reach. · Pt has made fair  progress towards set goals.    · Continue with current plan of care      Treatment Time In: 3418            Treatment Time Out: 6026                Treatment Charges: Mins Units   Ther Ex  18108     Manual Therapy [id number]     Thera Activities 74042 15 1   ADL/Home Mgt 37251 10 1   Neuro Re-ed 55644     Group Therapy      Orthotic manage/training  38936     Non-Billable Time     Total Timed Treatment 25 [address] [facility] [address]

## 2020-11-27 NOTE — PROGRESS NOTES
[address] [place] [address] [facility]  KELVIN  Progress Note    Face to face encounter  CC:confused- has sitter     SUBJECTIVE:  Patient is tolerating medications. No reported adverse drug reactions. ROS:no fevers. No chest pain. No rash. Confused. Has TELEsitter      On nasal canula- 3 liters        Medications:  Scheduled Meds:   Vitamin D  3,000 Units Oral Daily    zinc gluconate  50 mg Oral Daily    dexamethasone  6 mg Intravenous Q24H    remdesivir IVPB  100 mg Intravenous Q24H    vancomycin  125 mg Oral 4 times per day    lactobacillus  1 capsule Oral Q12H    B-complex with vitamin C  1 tablet Oral Daily    linezolid  600 mg Oral 2 times per day    amLODIPine  5 mg Oral Daily    DULoxetine  40 mg Oral Nightly    losartan  50 mg Oral Nightly    metoprolol succinate  25 mg Oral Daily    mirabegron  25 mg Oral Nightly    montelukast  10 mg Oral Nightly    ipratropium-albuterol  3 mL Inhalation 4x daily    levothyroxine  137 mcg Oral Daily    warfarin  7.5 mg Oral QPM    clotrimazole-betamethasone   Topical BID    Arformoterol Tartrate  15 mcg Nebulization BID    And    budesonide  0.5 mg Nebulization BID     Continuous Infusions:   sodium chloride 50 mL/hr at 11/27/20 1535    dextrose       PRN Meds:sodium chloride flush, haloperidol lactate, acetaminophen **OR** acetaminophen, sodium chloride, guaiFENesin, benzonatate, ondansetron, glucose, dextrose, glucagon (rDNA), dextrose  OBJECTIVE:  Patient Vitals for the past 24 hrs:   BP Temp Temp src Pulse Resp SpO2 Weight   11/27/20 1700 124/62 98 °F (36.7 °C) Oral 82 18 94 % --   11/27/20 0831 126/64 98.1 °F (36.7 °C) Oral 84 16 96 % --   11/27/20 0600 -- -- -- -- -- -- 120 lb 6.4 oz (54.6 kg)   11/27/20 0232 -- -- -- 88 -- -- --   11/26/20 1930 (!) 128/58 98 °F (36.7 °C) Oral 86 18 98 % --     Constitutional: The patient is awake -  IN BED   Skin: Warm and dry. No rashes were noted. Head: Eyes show round, and reactive pupils.  No jaundice. Chest: No use of accessory muscles to breathe. DEC BS ANT   Cardiovascular: S1 and S2 are rhythmic and regular. Abdomen: soft  Benign to palpation. Distended   Extremities:   no edema. PIV    Wound Care Documentation:  Wound 11/18/20 Back center (Active)   Dressing Status Clean;Dry; Intact 11/21/20 0820   Dressing/Treatment Foam 11/21/20 0820   Wound Length (cm) 1.7 cm 11/18/20 0140   Wound Width (cm) 1 cm 11/18/20 0140   Wound Surface Area (cm^2) 1.7 cm^2 11/18/20 0140   Drainage Amount None 11/19/20 0800   Number of days: 3       Laboratory and Tests Review:  Lab Results   Component Value Date    WBC 3.8 (L) 11/27/2020    WBC 4.6 11/26/2020    WBC 3.9 (L) 11/25/2020    HGB 11.0 (L) 11/27/2020    HCT 33.1 (L) 11/27/2020    MCV 81.5 11/27/2020     11/27/2020     Lab Results   Component Value Date    NEUTROABS 2.10 11/27/2020    NEUTROABS 3.48 11/26/2020    NEUTROABS 2.65 11/25/2020     Lab Results   Component Value Date    CRP 2.8 (H) 11/25/2020    CRP 4.1 (H) 11/24/2020    CRP 14.9 (H) 12/29/2019     Lab Results   Component Value Date    SEDRATE 86 (H) 12/29/2019    SEDRATE 84 (H) 12/29/2019    SEDRATE 86 (H) 12/27/2019     Lab Results   Component Value Date    ALT 19 11/27/2020    AST 27 11/27/2020    ALKPHOS 112 (H) 11/27/2020    BILITOT <0.2 11/27/2020     Lab Results   Component Value Date     11/27/2020    K 3.9 11/27/2020    K 4.4 10/06/2020    CL 93 11/27/2020    CO2 23 11/27/2020    BUN 28 11/27/2020    CREATININE 0.7 11/27/2020    GFRAA >60 11/27/2020    LABGLOM >60 11/27/2020    GLUCOSE 220 11/27/2020    GLUCOSE 158 10/03/2011    PROT 5.9 11/27/2020    LABALBU 2.6 11/27/2020    LABALBU 4.0 10/03/2011    CALCIUM 8.2 11/27/2020    BILITOT <0.2 11/27/2020    ALKPHOS 112 11/27/2020    AST 27 11/27/2020    ALT 19 11/27/2020     Radiology:  Reviewed      Microbiology:   11/17/2020- blood cx- enterococcus faecium and CONS  11/20/2020- blood cx-  No growth to date   C.diff- ++ detected  Urine cx- E.coli     ASSESSMENT:  · VRE Enterococcus faecium bacteremia-- ?  Source   · Diarrhea  C.diff- recurrent   · Leukopenia   · VITAMIN D DEFICIENCY   · Fevers   · History of falls- with c-spine fracture   · Bacteruria with E.coli   ·     PLAN:  · Continue zyvox   - 2 weeks  · Follow cbc and platelets - platelets- 337  · Continue PO vancomycin - continue for 4 weeks   · On probiotics   · On remdesivir- day #4  · On decadron   · Repeat blood cultures  ngtd    · Monitor labs      CAN D/C IN AM   PT MED REC    Electronically signed by Lesly Gimenez MD on 11/27/2020 at 6:46 PM    Phone [phone]  Fax [phone]

## 2020-11-27 NOTE — PROGRESS NOTES
OT BEDSIDE TREATMENT NOTE      Date:2020  Patient Name: Marvin Ring  MRN: [id number]  : 1940  Room: [address] [place] [address]          Attempted occupational therapy this date, however pt  would initially agree to therapy, however when this PISANO would remove covers to sit EOB pt would decline pulling covers back over her UB. Social work notified. Will continue to assess at later date/time. Continue current POC.        Adriano PISANO/DION [id number]

## 2020-11-27 NOTE — CARE COORDINATION
Ss note:11/27/2020.11:23 AM Covid positive on 11-. Per QFR & nursing staff pt no longer has a one on one sitter, has a Telesitter as of 11-. Family requesting sw investigate SNF that can accept this pt. At this time sw has made a referral to [facility] covid unit, bonny Kulkarni is reviewing and relays pt will need updated PT/Ot notes (therapy paged) and pt would need to be off the remdesivir end date is 11-. Liaison will continue to follow to determine if [facility] can accept this pt. SW also made a referral to Patty Mercado at [facility], she states they cannot accept pt on remdesivir either and would need to evaluate pts behavior and check for CDIFF before they would consider accepting this pt. SW left vm message for liaison Author Bernabe ([facility] liaison) to determine if any of their facilities could accept this covid positive pt.   SW/CM will continue to follow to try to secure SNF placement as family unable to care for pt at home per the dtr BERNARDO Polk

## 2020-11-27 NOTE — PROGRESS NOTES
Patient sleeping in bed comfortably. ..able to follow commands. Used the call light for going to the bathroom. . no behavioral issues at this time

## 2020-11-28 PROBLEM — U07.1 COVID-19 VIRUS DETECTED: Status: ACTIVE | Noted: 2020-01-01

## 2020-11-28 NOTE — PROGRESS NOTES
Notified of witnessed fall via RN Dian Mcardle, pt not complaining of any new onset pain related to the fall. Charge nurse and physician notified.

## 2020-11-28 NOTE — SIGNIFICANT EVENT
I was in adjacent room heard telesitter going off. Opened door to alert patient I was coming in but needed to gown up due to patient having covid-19. I applied ppe opened door to patient standing by the door. Patient turned around lost balance, I grabbed patent and was able to only slow the fall to her buttocks. Patient stated her only pain was her chronic back pain, and that it was no worse. Nurse and charge notified. Patient was assisted back to bed with a 2 assist. Patient was wearing non-skid socks, bed alarm was alarming,  and telesitter was going off when I opened the door.

## 2020-11-28 NOTE — PROGRESS NOTES
[address] [place] [address] [facility]  KELVIN  Progress Note    Face to face encounter  CC:confused-      SUBJECTIVE:  Patient is tolerating medications. No reported adverse drug reactions. ROS:no fevers. No chest pain. No rash. Confused. On nasal canula- 3 liters        Medications:  Scheduled Meds:   Vitamin D  3,000 Units Oral Daily    zinc gluconate  50 mg Oral Daily    dexamethasone  6 mg Intravenous Q24H    remdesivir IVPB  100 mg Intravenous Q24H    vancomycin  125 mg Oral 4 times per day    lactobacillus  1 capsule Oral Q12H    B-complex with vitamin C  1 tablet Oral Daily    linezolid  600 mg Oral 2 times per day    amLODIPine  5 mg Oral Daily    DULoxetine  40 mg Oral Nightly    losartan  50 mg Oral Nightly    metoprolol succinate  25 mg Oral Daily    mirabegron  25 mg Oral Nightly    montelukast  10 mg Oral Nightly    ipratropium-albuterol  3 mL Inhalation 4x daily    levothyroxine  137 mcg Oral Daily    warfarin  7.5 mg Oral QPM    clotrimazole-betamethasone   Topical BID    Arformoterol Tartrate  15 mcg Nebulization BID    And    budesonide  0.5 mg Nebulization BID     Continuous Infusions:   sodium chloride 50 mL/hr at 11/27/20 1535    dextrose       PRN Meds:sodium chloride flush, haloperidol lactate, acetaminophen **OR** acetaminophen, sodium chloride, guaiFENesin, benzonatate, ondansetron, glucose, dextrose, glucagon (rDNA), dextrose  OBJECTIVE:  Patient Vitals for the past 24 hrs:   BP Temp Temp src Pulse Resp SpO2 Weight   11/28/20 0845 (!) 142/66 98.6 °F (37 °C) Oral 88 20 -- --   11/28/20 0645 -- -- -- -- -- -- 134 lb 4.8 oz (60.9 kg)   11/27/20 2000 (!) 161/77 98.4 °F (36.9 °C) Oral 85 20 99 % --   11/27/20 1700 124/62 98 °F (36.7 °C) Oral 82 18 94 % --     Constitutional: The patient is awake -  IN BED   Skin: Warm and dry. Head: at/nc  Chest: No use of accessory muscles to breathe. DEC BS ANT   Cardiovascular: S1 and S2 are rhythmic and regular. Abdomen: soft  Benign to palpation. nt  Extremities:   no edema. PIV    Wound Care Documentation:  Wound 11/18/20 Back center (Active)   Dressing Status Clean;Dry; Intact 11/21/20 0820   Dressing/Treatment Foam 11/21/20 0820   Wound Length (cm) 1.7 cm 11/18/20 0140   Wound Width (cm) 1 cm 11/18/20 0140   Wound Surface Area (cm^2) 1.7 cm^2 11/18/20 0140   Drainage Amount None 11/19/20 0800   Number of days: 3       Laboratory and Tests Review:  Lab Results   Component Value Date    WBC 4.4 (L) 11/28/2020    WBC 3.8 (L) 11/27/2020    WBC 4.6 11/26/2020    HGB 11.6 11/28/2020    HCT 35.4 11/28/2020    MCV 83.3 11/28/2020     11/28/2020     Lab Results   Component Value Date    NEUTROABS 2.77 11/28/2020    NEUTROABS 2.10 11/27/2020    NEUTROABS 3.48 11/26/2020     Lab Results   Component Value Date    CRP 2.8 (H) 11/25/2020    CRP 4.1 (H) 11/24/2020    CRP 14.9 (H) 12/29/2019     Lab Results   Component Value Date    SEDRATE 86 (H) 12/29/2019    SEDRATE 84 (H) 12/29/2019    SEDRATE 86 (H) 12/27/2019     Lab Results   Component Value Date    ALT 24 11/28/2020    AST 31 11/28/2020    ALKPHOS 114 (H) 11/28/2020    BILITOT <0.2 11/28/2020     Lab Results   Component Value Date     11/28/2020    K 4.2 11/28/2020    K 4.4 10/06/2020    CL 94 11/28/2020    CO2 24 11/28/2020    BUN 35 11/28/2020    CREATININE 0.7 11/28/2020    GFRAA >60 11/28/2020    LABGLOM >60 11/28/2020    GLUCOSE 228 11/28/2020    GLUCOSE 158 10/03/2011    PROT 5.7 11/28/2020    LABALBU 2.6 11/28/2020    LABALBU 4.0 10/03/2011    CALCIUM 8.2 11/28/2020    BILITOT <0.2 11/28/2020    ALKPHOS 114 11/28/2020    AST 31 11/28/2020    ALT 24 11/28/2020     Radiology:  Reviewed      Microbiology:   11/17/2020- blood cx- enterococcus faecium and CONS  11/20/2020- blood cx-  No growth to date   C.diff- ++ detected  Urine cx- E.coli     ASSESSMENT:  · VRE Enterococcus faecium bacteremia-- ?  Source   · Diarrhea  C.diff- recurrent   · Leukopenia

## 2020-11-29 NOTE — PROGRESS NOTES
Three nurses attempted an IV site on patient with no success. Called ICU, they put her room number on the board to be done, however RN stated that they probably won't be able to get to it.

## 2020-11-29 NOTE — PROGRESS NOTES
Department of Internal Medicine  General Internal Medicine  Attending Progress Note      Subjective:  Due to Covid pandemic progress and history had been through nursing staff. The patient is awake but confused. No problems overnight. Denies chest pain, angina, and dyspnea. Denies abdominal pain. Tolerating diet. No nausea or vomiting. Objective:  Pt oriented, alert, coherent and logical    BP (!) 143/88   Pulse 97   Temp 98.6 °F (37 °C) (Oral)   Resp 20   Ht 5' 5\" (1.651 m)   Wt 134 lb 4.8 oz (60.9 kg)   SpO2 99%   BMI 22.35 kg/m²     Labs:   CBC with Differential:    Lab Results   Component Value Date    WBC 4.4 11/28/2020    RBC 4.25 11/28/2020    HGB 11.6 11/28/2020    HCT 35.4 11/28/2020     11/28/2020    MCV 83.3 11/28/2020    MCH 27.3 11/28/2020    MCHC 32.8 11/28/2020    RDW 14.4 11/28/2020    SEGSPCT 64 02/15/2013    LYMPHOPCT 30.8 11/28/2020    MONOPCT 6.3 11/28/2020    MYELOPCT 0.9 11/11/2018    BASOPCT 0.0 11/28/2020    MONOSABS 0.28 11/28/2020    LYMPHSABS 1.36 11/28/2020    EOSABS 0.00 11/28/2020    BASOSABS 0.00 11/28/2020     CMP:    Lab Results   Component Value Date     11/28/2020    K 4.2 11/28/2020    K 4.4 10/06/2020    CL 94 11/28/2020    CO2 24 11/28/2020    BUN 35 11/28/2020    CREATININE 0.7 11/28/2020    GFRAA >60 11/28/2020    LABGLOM >60 11/28/2020    GLUCOSE 228 11/28/2020    GLUCOSE 158 10/03/2011    PROT 5.7 11/28/2020    LABALBU 2.6 11/28/2020    LABALBU 4.0 10/03/2011    CALCIUM 8.2 11/28/2020    BILITOT <0.2 11/28/2020    ALKPHOS 114 11/28/2020    AST 31 11/28/2020    ALT 24 11/28/2020     LDH:    Lab Results   Component Value Date     11/26/2020     PT/INR:    Lab Results   Component Value Date    PROTIME 32.9 11/28/2020    INR 2.9 11/28/2020           Lumbar spine x-ray  THREE XRAY VIEWS OF THE LUMBAR SPINE    11/28/2020 12:55 am    COMPARISON:    CT lumbar spine October 25, 2020.     HISTORY:    ORDERING SYSTEM PROVIDED HISTORY: fall coccyx

## 2020-11-29 NOTE — PROGRESS NOTES
[address] [place] [address] [facility]  KELVIN  Progress Note    Face to face encounter  CC:confused- has sitter     SUBJECTIVE:  Patient is tolerating medications. No reported adverse drug reactions. ROS:         On nasal canula- 3 liters   Has sitter  Does no want to eat       Medications:  Scheduled Meds:   Vitamin D  3,000 Units Oral Daily    zinc gluconate  50 mg Oral Daily    dexamethasone  6 mg Intravenous Q24H    remdesivir IVPB  100 mg Intravenous Q24H    vancomycin  125 mg Oral 4 times per day    lactobacillus  1 capsule Oral Q12H    B-complex with vitamin C  1 tablet Oral Daily    linezolid  600 mg Oral 2 times per day    amLODIPine  5 mg Oral Daily    DULoxetine  40 mg Oral Nightly    losartan  50 mg Oral Nightly    metoprolol succinate  25 mg Oral Daily    mirabegron  25 mg Oral Nightly    montelukast  10 mg Oral Nightly    ipratropium-albuterol  3 mL Inhalation 4x daily    levothyroxine  137 mcg Oral Daily    warfarin  7.5 mg Oral QPM    clotrimazole-betamethasone   Topical BID    Arformoterol Tartrate  15 mcg Nebulization BID    And    budesonide  0.5 mg Nebulization BID     Continuous Infusions:   sodium chloride 50 mL/hr at 11/27/20 1535    dextrose       PRN Meds:sodium chloride flush, haloperidol lactate, acetaminophen **OR** acetaminophen, sodium chloride, guaiFENesin, benzonatate, ondansetron, glucose, dextrose, glucagon (rDNA), dextrose  OBJECTIVE:  Patient Vitals for the past 24 hrs:   BP Temp Temp src Pulse Resp SpO2   11/29/20 1100 (!) 153/77 98.7 °F (37.1 °C) Oral 86 17 93 %   11/28/20 2115 (!) 159/76 97.6 °F (36.4 °C) Oral 93 16 93 %     Constitutional: The patient is awake -  IN BED   Skin: Warm and dry. Head: at/nc  Chest: No use of accessory muscles to breathe. DEC BS ANT   Cardiovascular: S1 and S2 are rhythmic and regular. Abdomen: soft  Benign to palpation. nt  Extremities:   no edema.  Moves all extrem  PIV    Wound Care Documentation:  Wound 11/18/20 Back center (Active)   Dressing Status Clean;Dry; Intact 11/21/20 0820   Dressing/Treatment Foam 11/21/20 0820   Wound Length (cm) 1.7 cm 11/18/20 0140   Wound Width (cm) 1 cm 11/18/20 0140   Wound Surface Area (cm^2) 1.7 cm^2 11/18/20 0140   Drainage Amount None 11/19/20 0800   Number of days: 3       Laboratory and Tests Review:  Lab Results   Component Value Date    WBC 10.1 11/29/2020    WBC 4.4 (L) 11/28/2020    WBC 3.8 (L) 11/27/2020    HGB 13.7 11/29/2020    HCT 41.5 11/29/2020    MCV 82.0 11/29/2020     11/29/2020     Lab Results   Component Value Date    NEUTROABS 6.02 11/29/2020    NEUTROABS 2.77 11/28/2020    NEUTROABS 2.10 11/27/2020     Lab Results   Component Value Date    CRP 2.8 (H) 11/25/2020    CRP 4.1 (H) 11/24/2020    CRP 14.9 (H) 12/29/2019     Lab Results   Component Value Date    SEDRATE 86 (H) 12/29/2019    SEDRATE 84 (H) 12/29/2019    SEDRATE 86 (H) 12/27/2019     Lab Results   Component Value Date    ALT 27 11/29/2020    AST 35 (H) 11/29/2020    ALKPHOS 129 (H) 11/29/2020    BILITOT 0.3 11/29/2020     Lab Results   Component Value Date     11/29/2020    K 3.8 11/29/2020    K 4.4 10/06/2020    CL 90 11/29/2020    CO2 30 11/29/2020    BUN 33 11/29/2020    CREATININE 0.8 11/29/2020    GFRAA >60 11/29/2020    LABGLOM >60 11/29/2020    GLUCOSE 125 11/29/2020    GLUCOSE 158 10/03/2011    PROT 6.7 11/29/2020    LABALBU 3.0 11/29/2020    LABALBU 4.0 10/03/2011    CALCIUM 8.9 11/29/2020    BILITOT 0.3 11/29/2020    ALKPHOS 129 11/29/2020    AST 35 11/29/2020    ALT 27 11/29/2020     Radiology:  Reviewed      Microbiology:   11/17/2020- blood cx- enterococcus faecium and CONS  11/20/2020- blood cx-  No growth to date   C.diff- ++ detected  Urine cx- E.coli     ASSESSMENT:  · VRE Enterococcus faecium bacteremia-- ?  Source   · Diarrhea  C.diff- recurrent   · Leukopenia   · VITAMIN D DEFICIENCY   · Fevers   · History of falls- with c-spine fracture   · Bacteruria with E.coli ·     PLAN:  · Continue zyvox   - 2 weeks to end 12/4  · Follow cbc and platelets - platelets- stable  · Continue PO vancomycin - continue for 4 weeks -12/15  · On probiotics   · On remdesivir- had 4 day  · On decadron   · Repeat blood cultures  ngtd    · Monitor labs      CAN D/C   PT MED REC  F./u 2weeks     Electronically signed by Nila Mena MD on 11/29/2020 at 2:14 PM    Phone [phone]  Fax [phone]

## 2020-11-30 NOTE — PROGRESS NOTES
Physical Therapy    Physical Therapy Treatment Note    Room #:  7175/4844-53  Patient Name: Tacho Koehler  YOB: 1940  MRN: [id number]    Referring Provider: Pietro Patel MD     Date of Service: 11/30/2020    Evaluating Physical Therapist: Miriam Mcdonnell, PT  #[id number]       Diagnosis: Pneumonia [J18.9] Admitted with nausea, vomiting, diarrhea        Patient Active Problem List   Diagnosis    Breast mass, right    DJD (degenerative joint disease) of knee    Knee pain    Iron (Fe) deficiency anemia    Diabetes mellitus type II, controlled ([facility] [address] [facility]    Chronic obstructive pulmonary disease (HCC)    Atelectasis    Hyponatremia    S/P total knee arthroplasty    Status post total bilateral knee replacement    Localized osteoarthrosis, lower leg    Failed total left knee replacement (HCC)    Altered mental status    Generalized weakness    Weakness of left lower extremity    Closed nondisplaced fracture of acromial end of right clavicle    Intertrochanteric fracture of right hip, closed, initial encounter ([facility] [address] [facility]    Essential hypertension    Mixed hyperlipidemia    Bronchospasm    Tachycardia    Traumatic arthritis of wrist    Sepsis secondary to cat scratch ([address]    Pneumonia    C. difficile colitis    Acute urinary tract infection    Fluid overload    Lethargic    COVID-19 virus detected      Tentative placement recommendation: Subacute vs Home Health Physical Therapy if patient meets goals    Equipment recommendation: None      Prior Level of Function: Patient ambulated with supervision     Rehab Potential: good for baseline    Past medical history:   Past Medical History:   Diagnosis Date    Arthritis     joints, knees, back  djd    Cerebral artery occlusion with cerebral infarction ([facility] [address] [facility]     Chronic back pain     COPD (chronic obstructive pulmonary disease) ([facility] [address] [facility]     Diabetes mellitus ([facility] [address] [facility]     GERD (gastroesophageal reflux disease)     H/O cardiovascular stress test 11/12/2018    lexiscan stress test    Hyperlipidemia     Thyroid disease      Past Surgical History:   Procedure Laterality Date    APPENDECTOMY      BREAST SURGERY      cyst     CHOLECYSTECTOMY      COLONOSCOPY      ENDOSCOPY, COLON, DIAGNOSTIC      HYSTERECTOMY      JOINT REPLACEMENT      left knee    KNEE ARTHROPLASTY Right 1/15/2014    TOTAL KNEE ARTHROPLASTY    THYROIDECTOMY      TRANSESOPHAGEAL ECHOCARDIOGRAM N/A 11/23/2020    TRANSESOPHAGEAL ECHOCARDIOGRAM performed by Julia Aguilar MD at [facility] ENDOSCOPY     Precautions: Activity as tolerated, falls, alarm and dementia ,  covid +, telesitter    SUBJECTIVE:  Social history: Patient lives with spouse and dtr who states 24/7 in her house in a ranch home with 1 step to enter   Equipment owned: Cane, Wheeled Walker, Bedside commode, Shower chair and O2,       [address]   How much difficulty turning over in bed?: A Little  How much difficulty sitting down on / standing up from a chair with arms?: A Lot  How much difficulty moving from lying on back to sitting on side of bed?: A Lot  How much help from another person moving to and from a bed to a chair?: A Lot  How much help from another person needed to walk in hospital room?: A Lot  How much help from another person for climbing 3-5 steps with a railing?: A Lot  AM-PAC Inpatient Mobility Raw Score : 13  AM-PAC Inpatient T-Scale Score : 36.74  Mobility Inpatient CMS 0-100% Score: 64.91  Mobility Inpatient CMS G-Code Modifier : CL    Nursing cleared patient for PT treatment. Pt agreed to out of bed with encouragement   OBJECTIVE;   Initial Evaluation  Date: 11/18/20 Treatment Date:    11/30/2020   Short Term/ Long Term   Goals   Was pt agreeable to Eval/treatment?  Yes   Yes To be met in 3 days   Pain level   0/10    0/10    Bed Mobility    Rolling: Supervision     Supine to sit: Supervision     Sit to supine: Supervision     Scooting: Supervision    Rolling: Minimal assist of 1 with cues for technique     Supine to sit: Minimal assist of 1    Sit to supine: Minimal assist of 1    Scooting: Minimal assist of 1     Rolling: Independent    Supine to sit: Independent    Sit to supine: Independent    Scooting: Independent     Transfers Sit to stand: Minimal assist of 1  From chair, Bedside commode and bed for safety and redirection Sit to stand: Minimal assist of 1; Patient was given instruction Sit to stand: Independent     Ambulation    3x20 feet using  wheeled walker with Minimal assist of 1   for balance and o2 line management Patient not able to take a step, sat back down on the bed without warning. 75 feet using  wheeled walker with Supervision     ROM Within functional limits       Strength BUE:  refer to OT eval  RLE:  4/5  LLE:  4/5   Increase strength in affected mm groups by 1/3 grade   Balance Sitting EOB:  good    Dynamic Standing:  fair   Sitting EOB: good     Dynamic Standing: fair using wheeled walker   Sitting EOB:  good    Dynamic Standing: good       Patient is Alert & Oriented x person, place, time and situation and follows directions    Sensation: Patient denies numbness and tingling     Edema: no   Endurance: fair       Patient education  Patient educated on role of Physical Therapy, risks of immobility, safety and plan of care. Patient was educated and facilitated on techniques to increase safety and independence with bed mobility, balance, functional transfers, and functional mobility. Educated on benefits of mobility     Patient response to education:   Pt verbalized understanding Pt demonstrated skill Pt requires further education in this area   No Partial Yes      Treatment:  Patient practiced and was instructed/facilitated in the following treatment:   Patient transferred to edge of bed and sat up x 10 minutes to increase dynamic sitting balance and activity tolerance. Patient performed sit to stand transfers 3x.  Patient was returned to bed at end of treatment. Pt performed supine exercises. HOB tilted and pt given cues to push with bent BLE's to pull up in bed. Therapeutic Exercises: ankle pumps, quad sets, heel slide, hip abduction/adduction and straight leg raise, x 10 reps, AROM. Verbal/tactile cues for technique. At end of session, patient in bed with alarm,  call light and phone within reach, all lines and tubes intact, nursing notified. Patient would benefit from continued skilled Physical Therapy to improve functional independence and quality of life. Patient's/ family goals   home        ASSESSMENT: Patient required moderate  assistance for all functional mobility. At risk for falls. Fatigues quickly. Cues throughout for safety and technique. Pt on 3L O2. Plan of Care:   -Standing Balance: Perform strengthening exercises in standing to promote motor control with or without upper extremity support   -Transfers: Provide instruction on proper hand and foot position for adequate transfer of weight onto lower extremities and use of gait device  -Gait: Gait training and Standing activities to improve: base of support, weight shift, weight bearing    -Endurance: Utilize Supervised activities to increase level of endurance to allow for safe functional mobility including transfers and gait     Patient and or family understand(s) diagnosis, prognosis, and plan of care. Frequency of treatments: Patient will be seen daily.        Time in: 1450  Time out: 1513    Total Treatment Time: 23 minutes    CPT codes:  Therapeutic activities (76884)   13 minutes  1 unit(s)  Therapeutic exercises (84961)   10 minutes  1 unit(s)     Cheryl Flores PTA  Lic# [id number]

## 2020-11-30 NOTE — CARE COORDINATION
SS Note: COVID POSITIVE 11/24/20. [facility] had accepted pt to their quarantine unit however unit is now full. No other SNF in [facility] accepting Covid positive new referrals. [facility] had been following also and SW spoke to liaison however stated although charge nurse stating pt has not been 1:1 the physician notes in chart state pt has 1:1 sitter. [facility] also cited pt C-diff positive as reason unable to accept pt, they are cohorting Covid positive patients but no other patients both Covid and C-diff positive. [facility] in various other local [facility], is following however no of their Covid accepting SNFs have bed availability. Per nursing, pt is no longer on 1:1 sitter and pt's nurse has current note stating that pt is cooperative with staff and with taking medications. Nursing stated pt may no longer be C-diff positive and if this is true then [facility] may be able to consider pt.    Electronically signed by BERNARDO Sanon on 11/30/2020 at 3:16 PM

## 2020-11-30 NOTE — PROGRESS NOTES
Patient is not requiring a 1:1 sitter in the room or outside of the room. She is cooperating with the nursing staff by taking her medications. She is alert to self and place and has intermittent confusion on what is going on, but is pleasant. Patient is no longer having diarrhea. This nurse was told in report that she had one bowel movement last night and it was soft consistency. Patient has been following nursing commands and has not caused any problems at this time. Will continue to monitor patient.

## 2020-11-30 NOTE — PROGRESS NOTES
Department of Internal Medicine  General Internal Medicine  Attending Progress Note      Subjective: The patient is awake and alert, confused. No problems overnight. Denies chest pain, angina, and dyspnea. Denies abdominal pain. Tolerating diet. No nausea or vomiting. Objective:  Pt oriented, alert, coherent confused    /71   Pulse 90   Temp 98.2 °F (36.8 °C) (Oral)   Resp 18   Ht 5' 5\" (1.651 m)   Wt 134 lb 4.8 oz (60.9 kg)   SpO2 92%   BMI 22.35 kg/m²     Head and Neck: normal atraumatic, no neck masses, normal thyroid, no jvd    Heart:  regular rate and rhythm, S1, S2 normal, no murmur, click, rub or gallop    Lungs:  chest clear, no wheezing, rales, normal symmetric air entry, no chest wall deformities or tenderness    Abd: soft, non-tender, without masses or organomegaly    Extrem:  No clubbing, cyanosis, or edema    Neuro: no focal neurological deficit     Skin: No rashes, lesions, or ecchymosis, no ulcers. Psych: No apparent anxiety, agitation, or depression.      Labs:   CBC:   Lab Results   Component Value Date    WBC 10.1 11/29/2020    RBC 5.06 11/29/2020    HGB 13.7 11/29/2020    HCT 41.5 11/29/2020    MCV 82.0 11/29/2020    MCH 27.1 11/29/2020    MCHC 33.0 11/29/2020    RDW 14.3 11/29/2020     11/29/2020    MPV 9.7 11/29/2020     CMP:    Lab Results   Component Value Date     11/29/2020    K 3.8 11/29/2020    K 4.4 10/06/2020    CL 90 11/29/2020    CO2 30 11/29/2020    BUN 33 11/29/2020    CREATININE 0.8 11/29/2020    GFRAA >60 11/29/2020    LABGLOM >60 11/29/2020    GLUCOSE 125 11/29/2020    GLUCOSE 158 10/03/2011    PROT 6.7 11/29/2020    LABALBU 3.0 11/29/2020    LABALBU 4.0 10/03/2011    CALCIUM 8.9 11/29/2020    BILITOT 0.3 11/29/2020    ALKPHOS 129 11/29/2020    AST 35 11/29/2020    ALT 27 11/29/2020     PT/INR:    Lab Results   Component Value Date    PROTIME 42.9 11/29/2020    INR 3.8 11/29/2020        X-Rays:       Assessment:  Pneumonia resolving  COVID-19 resolving  C. difficile resolving  Hyponatremia improving    Principal Problem:    Pneumonia  Active Problems:    Diabetes mellitus type II, controlled ([facility] [address]    Chronic obstructive pulmonary disease (HCC)    Altered mental status    Essential hypertension    Mixed hyperlipidemia    C. difficile colitis    Acute urinary tract infection    Fluid overload    Lethargic    COVID-19 virus detected  Resolved Problems:    * No resolved hospital problems.  *      Plan:  Transfer to nursing home tomorrow on oral meds  See orders    Electronically signed by Dav Hamilton MD on 11/29/2020 at 9:14 PM

## 2020-11-30 NOTE — PROGRESS NOTES
Department of Internal Medicine  General Internal Medicine  Attending Progress Note      Subjective: The patient was seen and examined and she is sleepy but easily arousable . Following simple commands. No problems overnight. Patient has been without a sitter since yesterday. Denies chest pain, angina, and dyspnea. Denies abdominal pain. She had only one bowel movement yesterday denying any more diarrhea, laying comfortably in bed. Tolerating diet. No nausea or vomiting. Objective:  Pt is oriented    /85   Pulse 87   Temp 98.5 °F (36.9 °C) (Oral)   Resp 16   Ht 5' 5\" (1.651 m)   Wt 131 lb 11.2 oz (59.7 kg)   SpO2 95%   BMI 21.92 kg/m²     Head and Neck: normal atraumatic, no neck masses, normal thyroid, no jvd    Heart:  regular rate and rhythm, S1, S2 normal, no murmur, click, rub or gallop    Lungs:  chest with improved air exchange, no wheezing, rales, normal symmetric air entry, no chest wall deformities or tenderness    Abd: soft, non-tender, without masses or organomegaly    Extrem:  No clubbing, cyanosis, or edema    Neuro: no focal neurological deficit     Skin: No rashes, lesions, or ecchymosis, no ulcers. Psych: No apparent anxiety, agitation, or depression.      Labs:   CBC:   Lab Results   Component Value Date    WBC 13.8 11/30/2020    RBC 4.29 11/30/2020    HGB 11.9 11/30/2020    HCT 35.1 11/30/2020    MCV 81.8 11/30/2020    MCH 27.7 11/30/2020    MCHC 33.9 11/30/2020    RDW 14.5 11/30/2020     11/30/2020    MPV 10.5 11/30/2020     CMP:    Lab Results   Component Value Date     11/30/2020    K 4.4 11/30/2020    K 4.4 10/06/2020    CL 92 11/30/2020    CO2 28 11/30/2020    BUN 33 11/30/2020    CREATININE 0.8 11/30/2020    GFRAA >60 11/30/2020    LABGLOM >60 11/30/2020    GLUCOSE 174 11/30/2020    GLUCOSE 158 10/03/2011    PROT 5.6 11/30/2020    LABALBU 2.3 11/30/2020    LABALBU 4.0 10/03/2011    CALCIUM 8.6 11/30/2020    BILITOT 0.3 11/30/2020    ALKPHOS 111 11/30/2020    AST 23 11/30/2020    ALT 18 11/30/2020     PT/INR:    Lab Results   Component Value Date    PROTIME 42.2 11/30/2020    INR 3.7 11/30/2020            Assessment:    Principal Problem:    Pneumonia  Active Problems:    Diabetes mellitus type II, controlled ([address]    Chronic obstructive pulmonary disease (HCC)    Altered mental status    Essential hypertension    Mixed hyperlipidemia    C. difficile colitis    Acute urinary tract infection    Fluid overload    Lethargic    COVID-19 virus detected  Resolved Problems:    * No resolved hospital problems. *      Plan:  1. Check chest x-ray  2. Continue current management  3. Discontinue C. difficile isolation  4.  Plans to discharge to the nursing home as soon as bed is available for rehabilitation    See orders    Electronically signed by Harpreet Melendez MD on 11/30/2020 at 1:16 PM

## 2020-11-30 NOTE — PROGRESS NOTES
[address] [place] [address] [facility]  ATIYAIDA  Progress Note    Face to face encounter  CC:confused- has sitter     SUBJECTIVE:  Patient is tolerating medications. No reported adverse drug reactions. ROS: no nausea, vomiting, fevers, chills. No reports of diarrhea. No rash. on room air. In bed- has telesitter     Medications:  Scheduled Meds:   Vitamin D  3,000 Units Oral Daily    zinc gluconate  50 mg Oral Daily    dexamethasone  6 mg Intravenous Q24H    vancomycin  125 mg Oral 4 times per day    lactobacillus  1 capsule Oral Q12H    B-complex with vitamin C  1 tablet Oral Daily    linezolid  600 mg Oral 2 times per day    amLODIPine  5 mg Oral Daily    DULoxetine  40 mg Oral Nightly    losartan  50 mg Oral Nightly    metoprolol succinate  25 mg Oral Daily    mirabegron  25 mg Oral Nightly    montelukast  10 mg Oral Nightly    ipratropium-albuterol  3 mL Inhalation 4x daily    levothyroxine  137 mcg Oral Daily    warfarin  7.5 mg Oral QPM    clotrimazole-betamethasone   Topical BID    Arformoterol Tartrate  15 mcg Nebulization BID    And    budesonide  0.5 mg Nebulization BID     Continuous Infusions:   sodium chloride 50 mL/hr at 11/27/20 1535    dextrose       PRN Meds:sodium chloride flush, haloperidol lactate, acetaminophen **OR** acetaminophen, sodium chloride, guaiFENesin, benzonatate, ondansetron, glucose, dextrose, glucagon (rDNA), dextrose  OBJECTIVE:  Patient Vitals for the past 24 hrs:   BP Temp Temp src Pulse Resp SpO2 Weight   11/30/20 0900 118/85 98.5 °F (36.9 °C) Oral 87 16 95 % --   11/30/20 0545 -- -- -- -- -- -- 131 lb 11.2 oz (59.7 kg)   11/29/20 1930 139/71 98.2 °F (36.8 °C) Oral 90 18 92 % --   11/29/20 1745 -- -- -- -- -- 92 % --     Constitutional: The patient is awake -  IN BED - has telesitter. Skin: Warm and dry. Head: at/nc  Chest: No use of accessory muscles to breathe. DEC BS ANT   Cardiovascular: S1 and S2 are rhythmic and regular.     Abdomen: soft Benign to palpation. nt  Extremities:   no edema. Moves all extrem  PIV    Wound Care Documentation:  Wound 11/18/20 Back center (Active)   Dressing Status Clean;Dry; Intact 11/21/20 0820   Dressing/Treatment Foam 11/21/20 0820   Wound Length (cm) 1.7 cm 11/18/20 0140   Wound Width (cm) 1 cm 11/18/20 0140   Wound Surface Area (cm^2) 1.7 cm^2 11/18/20 0140   Drainage Amount None 11/19/20 0800   Number of days: 3       Laboratory and Tests Review:  Lab Results   Component Value Date    WBC 13.8 (H) 11/30/2020    WBC 10.1 11/29/2020    WBC 4.4 (L) 11/28/2020    HGB 11.9 11/30/2020    HCT 35.1 11/30/2020    MCV 81.8 11/30/2020     11/30/2020     Lab Results   Component Value Date    NEUTROABS 9.76 (H) 11/30/2020    NEUTROABS 6.02 11/29/2020    NEUTROABS 2.77 11/28/2020     Lab Results   Component Value Date    CRP 2.8 (H) 11/25/2020    CRP 4.1 (H) 11/24/2020    CRP 14.9 (H) 12/29/2019     Lab Results   Component Value Date    SEDRATE 86 (H) 12/29/2019    SEDRATE 84 (H) 12/29/2019    SEDRATE 86 (H) 12/27/2019     Lab Results   Component Value Date    ALT 18 11/30/2020    AST 23 11/30/2020    ALKPHOS 111 (H) 11/30/2020    BILITOT 0.3 11/30/2020     Lab Results   Component Value Date     11/30/2020    K 4.4 11/30/2020    K 4.4 10/06/2020    CL 92 11/30/2020    CO2 28 11/30/2020    BUN 33 11/30/2020    CREATININE 0.8 11/30/2020    GFRAA >60 11/30/2020    LABGLOM >60 11/30/2020    GLUCOSE 174 11/30/2020    GLUCOSE 158 10/03/2011    PROT 5.6 11/30/2020    LABALBU 2.3 11/30/2020    LABALBU 4.0 10/03/2011    CALCIUM 8.6 11/30/2020    BILITOT 0.3 11/30/2020    ALKPHOS 111 11/30/2020    AST 23 11/30/2020    ALT 18 11/30/2020     Radiology:  Reviewed      Microbiology:   11/17/2020- blood cx- enterococcus faecium and CONS  11/20/2020- blood cx-  No growth to date   C.diff- ++ detected  Urine cx- E.coli     ASSESSMENT:  · VRE Enterococcus faecium bacteremia-- ?  Source   · Diarrhea  C.diff- recurrent   · Leukocytosis · VITAMIN D DEFICIENCY   · Fevers   · History of falls- with c-spine fracture   · Bacteruria with E.coli     PLAN:  · Continue zyvox   - 2 weeks to end 12/4  · Follow cbc and platelets - platelets- stable  · Continue PO vancomycin - continue for 4 weeks -12/15  · On probiotics   · On remdesivir- today is day #5  · On decadron   · Repeat blood cultures  ngtd  · Monitor labs  · Can d/c from ID POV  · Med rec is done     Electronically signed by CHRISTAL Zavala on 11/30/2020 at 12:14 PM    Phone [phone]  Fax [phone]  As above    This is a face to face encounter with Faina Zhong on 11/30/20. I have performed and participated in the history, exam, medical decision making, and  POC  with the NURSE PRACTITIONER and provided the instruction and education regarding this patient's care. Imaging and labs were reviewed per medical records and any ID pertinent labs were addressed with the patient. The patient was educated about the diagnosis, prognosis, indications, risks and benefits of treatment. Pt had the opportunity to ask questions. All questions were answered. HAS SITTER  NOT DOING MUCH   ? GIVING UP  FOR NOW CONT TO RX AS PLANNED  Thank you for involving me in the care of Faina Zhong. Please do not hesitate to call for any questions or concerns.     Electronically signed by Fe Macias MD on 11/30/2020 at 5:27 PM    Phone [phone]  Fax [phone]

## 2020-12-01 NOTE — PROGRESS NOTES
[address] [place] [address] [facility]  KELVIN  Progress Note    Face to face encounter  CC:confused- has sitter     SUBJECTIVE:  Patient is tolerating medications. No reported adverse drug reactions. Dec appetite refuse pills  Has sitter  For d/c today      Medications:  Scheduled Meds:   warfarin  5 mg Oral QPM    Vitamin D  3,000 Units Oral Daily    zinc gluconate  50 mg Oral Daily    vancomycin  125 mg Oral 4 times per day    lactobacillus  1 capsule Oral Q12H    B-complex with vitamin C  1 tablet Oral Daily    linezolid  600 mg Oral 2 times per day    amLODIPine  5 mg Oral Daily    DULoxetine  40 mg Oral Nightly    losartan  50 mg Oral Nightly    metoprolol succinate  25 mg Oral Daily    mirabegron  25 mg Oral Nightly    montelukast  10 mg Oral Nightly    ipratropium-albuterol  3 mL Inhalation 4x daily    levothyroxine  137 mcg Oral Daily    clotrimazole-betamethasone   Topical BID    Arformoterol Tartrate  15 mcg Nebulization BID    And    budesonide  0.5 mg Nebulization BID     Continuous Infusions:   sodium chloride 50 mL/hr at 11/27/20 1535    dextrose       PRN Meds:sodium chloride flush, haloperidol lactate, acetaminophen **OR** acetaminophen, sodium chloride, guaiFENesin, benzonatate, ondansetron, glucose, dextrose, glucagon (rDNA), dextrose  OBJECTIVE:  Patient Vitals for the past 24 hrs:   BP Temp Temp src Pulse Resp SpO2 Weight   12/01/20 1045 (!) 165/75 97.9 °F (36.6 °C) Oral 92 17 95 % --   12/01/20 0600 -- -- -- -- -- -- 131 lb (59.4 kg)   11/30/20 2115 135/87 98.1 °F (36.7 °C) Oral 95 16 95 % --     Constitutional: The patient is awake -  IN BED - has telesitter. Skin: Warm and dry. Head: at/nc  Chest: . DEC BS ANT   Cardiovascular: S1 and S2 are rhythmic and regular. Abdomen: soft  Benign to palpation. nt  Extremities:   no edema. Moves all extrem  Cns flat  PIV    Wound Care Documentation:  Wound 11/18/20 Back center (Active)   Dressing Status Clean;Dry; Intact 11/21/20 0820   Dressing/Treatment Foam 11/21/20 0820   Wound Length (cm) 1.7 cm 11/18/20 0140   Wound Width (cm) 1 cm 11/18/20 0140   Wound Surface Area (cm^2) 1.7 cm^2 11/18/20 0140   Drainage Amount None 11/19/20 0800   Number of days: 3       Laboratory and Tests Review:  Lab Results   Component Value Date    WBC 13.8 (H) 11/30/2020    WBC 10.1 11/29/2020    WBC 4.4 (L) 11/28/2020    HGB 11.9 11/30/2020    HCT 35.1 11/30/2020    MCV 81.8 11/30/2020     11/30/2020     Lab Results   Component Value Date    NEUTROABS 9.76 (H) 11/30/2020    NEUTROABS 6.02 11/29/2020    NEUTROABS 2.77 11/28/2020     Lab Results   Component Value Date    CRP 2.8 (H) 11/25/2020    CRP 4.1 (H) 11/24/2020    CRP 14.9 (H) 12/29/2019     Lab Results   Component Value Date    SEDRATE 86 (H) 12/29/2019    SEDRATE 84 (H) 12/29/2019    SEDRATE 86 (H) 12/27/2019     Lab Results   Component Value Date    ALT 18 11/30/2020    AST 23 11/30/2020    ALKPHOS 111 (H) 11/30/2020    BILITOT 0.3 11/30/2020     Lab Results   Component Value Date     11/30/2020    K 4.4 11/30/2020    K 4.4 10/06/2020    CL 92 11/30/2020    CO2 28 11/30/2020    BUN 33 11/30/2020    CREATININE 0.8 11/30/2020    GFRAA >60 11/30/2020    LABGLOM >60 11/30/2020    GLUCOSE 174 11/30/2020    GLUCOSE 158 10/03/2011    PROT 5.6 11/30/2020    LABALBU 2.3 11/30/2020    LABALBU 4.0 10/03/2011    CALCIUM 8.6 11/30/2020    BILITOT 0.3 11/30/2020    ALKPHOS 111 11/30/2020    AST 23 11/30/2020    ALT 18 11/30/2020     Radiology:  Reviewed      Microbiology:   11/17/2020- blood cx- enterococcus faecium and CONS  11/20/2020- blood cx-  No growth to date   C.diff- ++ detected  Urine cx- E.coli     ASSESSMENT:  · VRE Enterococcus faecium bacteremia-- ?  Source   · Diarrhea  C.diff- recurrent   · Leukocytosis   · VITAMIN D DEFICIENCY   · Fevers   · History of falls- with c-spine fracture   · Bacteruria with E.coli     PLAN:  · Continue zyvox   - 2 weeks to end 12/4  · Follow cbc and platelets - platelets- stable  · Continue PO vancomycin - continue for 4 weeks -12/15  · On probiotics   · On remdesivir- today is day #5  · On decadron   · Repeat blood cultures  ngtd  · Can d/c from ID POV  · Pt going home  · Med rec is done     Electronically signed by Nazia Stearns MD on 12/1/2020 at 2:03 PM    Phone [phone]  Fax [phone]

## 2020-12-01 NOTE — PROGRESS NOTES
11/12/2018    lexiscan stress test    Hyperlipidemia     Thyroid disease      Past Surgical History:   Procedure Laterality Date    APPENDECTOMY      BREAST SURGERY      cyst     CHOLECYSTECTOMY      COLONOSCOPY      ENDOSCOPY, COLON, DIAGNOSTIC      HYSTERECTOMY      JOINT REPLACEMENT      left knee    KNEE ARTHROPLASTY Right 1/15/2014    TOTAL KNEE ARTHROPLASTY    THYROIDECTOMY      TRANSESOPHAGEAL ECHOCARDIOGRAM N/A 11/23/2020    TRANSESOPHAGEAL ECHOCARDIOGRAM performed by Joselyn Underwood MD at [facility] ENDOSCOPY     Precautions: Activity as tolerated, falls, alarm and dementia ,  covid +, telesitter    SUBJECTIVE:  Social history: Patient lives with spouse and dtr who states 24/7 in her house in a ranch home with 1 step to enter   Equipment owned: Cane, Wheeled Walker, Bedside commode, Shower chair and O2,       [address]   How much difficulty turning over in bed?: A Little  How much difficulty sitting down on / standing up from a chair with arms?: A Lot  How much difficulty moving from lying on back to sitting on side of bed?: A Lot  How much help from another person moving to and from a bed to a chair?: A Lot  How much help from another person needed to walk in hospital room?: A Lot  How much help from another person for climbing 3-5 steps with a railing?: A Lot  AM-PAC Inpatient Mobility Raw Score : 13  AM-PAC Inpatient T-Scale Score : 36.74  Mobility Inpatient CMS 0-100% Score: 64.91  Mobility Inpatient CMS G-Code Modifier : CL    Nursing cleared patient for PT treatment. Pt agreed to activity   OBJECTIVE;   Initial Evaluation  Date: 11/18/20 Treatment Date:    12/1/2020   Short Term/ Long Term   Goals   Was pt agreeable to Eval/treatment?  Yes   Yes To be met in 3 days   Pain level   0/10    0/10    Bed Mobility    Rolling: Supervision     Supine to sit: Supervision     Sit to supine: Supervision     Scooting: Supervision    Rolling: Minimal assist of 1 with cues for technique     Supine to sit: Minimal assist of 1    Sit to supine: Minimal assist of 1    Scooting: Minimal assist of 1     Rolling: Independent    Supine to sit: Independent    Sit to supine: Independent    Scooting: Independent     Transfers Sit to stand: Minimal assist of 1  From chair, Bedside commode and bed for safety and redirection Sit to stand: Moderate assist of 1; Patient was given instruction Sit to stand: Independent     Ambulation    3x20 feet using  wheeled walker with Minimal assist of 1   for balance and o2 line management Pt amb 6 ft using wheeled walker and needing to sit. Pt amb back to bed with mod assist using wheeled walker . Cues for safety, obstacle negotiation   75 feet using  wheeled walker with Supervision     ROM Within functional limits       Strength BUE:  refer to OT eval  RLE:  4/5  LLE:  4/5   Increase strength in affected mm groups by 1/3 grade   Balance Sitting EOB:  good    Dynamic Standing:  fair   Sitting EOB: good     Dynamic Standing: fair using wheeled walker   Sitting EOB:  good    Dynamic Standing: good       Patient is Alert & Oriented x person, place, time and situation and follows directions    Sensation: Patient denies numbness and tingling     Edema: no   Endurance: fair       Patient education  Patient educated on role of Physical Therapy, risks of immobility, safety and plan of care. Patient was educated and facilitated on techniques to increase safety and independence with bed mobility, balance, functional transfers, and functional mobility. Educated on benefits of mobility     Patient response to education:   Pt verbalized understanding Pt demonstrated skill Pt requires further education in this area   No Partial Yes      Treatment:  Patient practiced and was instructed/facilitated in the following treatment:   Patient transferred to edge of bed and sat up x 10 minutes to increase dynamic sitting balance and activity tolerance.  Patient performed sit to stand transfers 3x. Patient was returned to bed at end of treatment. Pt performed supine exercises. HOB tilted and pt given cues to push with bent BLE's to pull up in bed. Therapeutic Exercises: ankle pumps, hip abduction/adduction and long arc quad, x 10-20 reps, AROM. Verbal/tactile cues for technique. At end of session, patient in bed in chair position with alarm,  call light and phone within reach, all lines and tubes intact, nursing notified. Patient would benefit from continued skilled Physical Therapy to improve functional independence and quality of life. Patient's/ family goals   home        ASSESSMENT: Patient required moderate  assistance for all functional mobility. At risk for falls. Fatigues quickly. Cues throughout for safety and technique. Pt on 3L O2. Plan of Care:   -Standing Balance: Perform strengthening exercises in standing to promote motor control with or without upper extremity support   -Transfers: Provide instruction on proper hand and foot position for adequate transfer of weight onto lower extremities and use of gait device  -Gait: Gait training and Standing activities to improve: base of support, weight shift, weight bearing    -Endurance: Utilize Supervised activities to increase level of endurance to allow for safe functional mobility including transfers and gait     Patient and or family understand(s) diagnosis, prognosis, and plan of care. Frequency of treatments: Patient will be seen daily.        Time in: 1205  Time out: 1230    Total Treatment Time: 25 minutes    CPT codes:  Therapeutic activities (04459)   15 minutes  1 unit(s)  Therapeutic exercises (26105)   10 minutes  1 unit(s)     [place] [facility] PTA  LIC# [id number]

## 2020-12-01 NOTE — DISCHARGE SUMMARY
Physician Discharge Summary     Patient ID:  Kurt Cramer  [id number]  79 y.o.  1940    Admit date: 11/17/2020    Discharge date and time:      Admission Diagnoses: Principal Problem:    Pneumonia  Active Problems:    Diabetes mellitus type II, controlled ([address]    Chronic obstructive pulmonary disease (HCC)    Altered mental status    Essential hypertension    Mixed hyperlipidemia    C. difficile colitis    Acute urinary tract infection    Fluid overload    Lethargic    COVID-19 virus detected  Resolved Problems:    * No resolved hospital problems. *      Discharge Diagnoses:   #1 COVID-19 virus pneumonia  2. Acute C. difficile colitis  3. Sepsis with VRE Enterococcus species  4. Acute urinary tract infection  5. Diabetes mellitus type 2  6. Chronic obstructive pulmonary disease  7. Hyponatremia  8. Mixed hyperlipidemia    Consults: ID    Procedures: CARLOS which was negative for vegetations      Hospital Course: Kurt Cramer is an 79 y.o. white female, known to have a history of adult onset diabetes mellitus atrial fibrillation COPD has recently fallen about 3 weeks ago suffered fracture of T12 was evaluated in [facility] clinic no kyphoplasty recommended incidental finding on the CT was left renal mass suggestive of neoplasm. Patient was brought to the emergency room because of 4 to 5 days history of nausea vomiting diarrhea no fever no chills patient is confused unable to give any clear history family states that she has been very feeling very weak,. Work-up on admission showed negative Covid test sodium 128 chest x-ray showing new upper lobe infiltrate, and urine showed many leukocytes positive leukocyte goran many bacteria urine culture grew E. Coli. Patient was started on IV antibiotics which were adjusted after Blood cultures came back positive in 2 bottles with VRE Enterococcus species. C. difficile toxin was also positive so patient was started on oral vancomycin and Zyvox.   At that point family wanted to take patient home for continued rehabilitation. PT OT were consulted and patient continued to improve but then she started getting more lethargic. Became hypoxic with pulse ox in the 80s at that point a Covid test was ordered and came back positive for COVID-19. Patient was then started on remdesivir for 5 days, IV Decadron, zinc and conservative and supportive measures. Over the next few days she became confused and was getting progressively weak. Due to the fact that patient was in isolation for C. difficile and COVID-19 there was no facility that could take patient so it was made known to the daughter to possibly take her home with home care for continuing rehabilitation. Patient is agreeable. She is to continue Zyvox and Vancocin as per ID. Patient is discharged in a stable condition. Prognosis is fair. Disposition: home on home care    Patient Instructions:   Current Discharge Medication List      START taking these medications    Details   Cholecalciferol (VITAMIN D) 25 MCG TABS Take 3 tablets by mouth daily  Qty: 60 tablet, Refills: 3    Comments: Labeling may look different. 25 mcg=1000 Units. Please double check dosages.       dexamethasone (DECADRON) 0.5 MG tablet Take 12 tablets by mouth daily (with breakfast) for 5 days  Qty: 60 tablet, Refills: 0      vancomycin (VANCOCIN) 50 mg/mL oral solution Take 2.5 mLs by mouth 4 times daily for 28 days MAY SUBSTITUTE FOR CAPSULES  Qty: 280 mL, Refills: 0      lactobacillus (CULTURELLE) CAPS capsule Take 1 capsule by mouth every 12 hours  Qty: 60 capsule, Refills: 1      linezolid (ZYVOX) 600 MG tablet Take 1 tablet by mouth every 12 hours for 14 days  Qty: 28 tablet, Refills: 0      B Complex-C (B-COMPLEX WITH VITAMIN C) tablet Take 1 tablet by mouth daily  Qty: 30 tablet, Refills: 3         CONTINUE these medications which have CHANGED    Details   warfarin (COUMADIN) 5 MG tablet Hold if INR is above or equal to 3.0  Qty: 30 tablet, Refills: 3         CONTINUE these medications which have NOT CHANGED    Details   Nutritional Supplements (ENSURE ORIGINAL) LIQD Take 1 Can by mouth three times daily       acetaminophen (TYLENOL) 500 MG tablet Take 500 mg by mouth every 6 hours as needed for Pain      rosuvastatin (CRESTOR) 20 MG tablet Take 20 mg by mouth nightly      mirabegron (MYRBETRIQ) 25 MG TB24 Take 25 mg by mouth nightly      levothyroxine (SYNTHROID) 137 MCG tablet Take 135 mcg by mouth Daily      metoprolol succinate (TOPROL XL) 25 MG extended release tablet Take 1 tablet by mouth daily  Qty: 30 tablet, Refills: 3      ipratropium-albuterol (DUONEB) 0.5-2.5 (3) MG/3ML SOLN nebulizer solution Inhale 3 mLs into the lungs every 6 hours as needed for Shortness of Breath  Qty: 360 mL, Refills: 0      amLODIPine (NORVASC) 5 MG tablet Take 1 tablet by mouth daily  Qty: 30 tablet, Refills: 3      DULoxetine HCl 40 MG CPEP Take 40 mg by mouth nightly       glimepiride (AMARYL) 2 MG tablet Take 2 mg by mouth every morning (before breakfast)      omeprazole (PRILOSEC) 40 MG capsule Take 40 mg by mouth daily      fluticasone-salmeterol (ADVAIR) 250-50 MCG/DOSE AEPB Inhale 1 puff into the lungs every 12 hours      tiotropium (SPIRIVA) 18 MCG inhalation capsule Inhale 18 mcg into the lungs daily. losartan (COZAAR) 50 MG tablet Take 50 mg by mouth nightly       montelukast (SINGULAIR) 10 MG tablet Take 10 mg by mouth nightly.       OXYGEN Inhale 2 L into the lungs nightly          STOP taking these medications       ondansetron (ZOFRAN ODT) 4 MG disintegrating tablet Comments:   Reason for Stopping:             Activity: activity as tolerated  Diet: diabetic diet    Follow-up with Dr. Saul Yu in 2 weeks    Note that over 30 minutes was spent in preparing discharge papers, discussing discharge with patient, medication review, etc.      Electronically signed by Percy Castellano MD on 12/1/2020 at 12:36 PM

## 2020-12-01 NOTE — PROGRESS NOTES
Comprehensive Nutrition Assessment    Type and Reason for Visit:  Reassess    Nutrition Recommendations/Plan: Continue with current diet and ONS    Nutrition Assessment:  Pt remains nutritionally compromised 2/2 varied po intakes w/ meals, good acceptance w/ONS. Will continue with current nutritional regimen    Malnutrition Assessment:  Malnutrition Status: At risk for malnutrition (Comment)    Context:  Acute Illness     Findings of the 6 clinical characteristics of malnutrition:  Energy Intake:  7 - 50% or less of estimated energy requirements for 5 or more days  Weight Loss:  No significant weight loss(GARTH weight changes 2/2 no weight methods per emr)     Body Fat Loss:  No significant body fat loss     Muscle Mass Loss:  No significant muscle mass loss    Fluid Accumulation:  No significant fluid accumulation     Strength:  Not Performed    Estimated Daily Nutrient Needs:  Energy (kcal):  1400-1500kcal/d; Weight Used for Energy Requirements:  Current     Protein (g):  80-90gm pro/d(x1.3-1.5gm/kg); Weight Used for Protein Requirements:  Current        Fluid (ml/day):  1400-1500ml/d; Method Used for Fluid Requirements:  1 ml/kcal      Nutrition Related Findings:  A/ox2, missing teeth, +BS, Abd WDL, No edema, +I/O +6L      Wounds:  Pressure Injury(Back)       Current Nutrition Therapies:    DIET CARB CONTROL; Dental Soft  Dietary Nutrition Supplements: Wound Healing Oral Supplement  Dietary Nutrition Supplements: Diabetic Oral Supplement    Anthropometric Measures:  · Height: 5' 5\" (165.1 cm)  · Current Body Weight: 132 lb (59.9 kg)(11/18)   · Admission Body Weight: 132 lb (59.9 kg)    · Usual Body Weight:       · Ideal Body Weight: 125 lbs; % Ideal Body Weight 105.6 %   · BMI: 22  · Adjusted Body Weight:  ;      · BMI Categories: Normal Weight (BMI 18.5-24. 9)       Nutrition Diagnosis:   · Inadequate energy intake related to altered GI function as evidenced by intake 26-50%, poor intake prior to admission, wounds, GI abnormality      Nutrition Interventions:   Food and/or Nutrient Delivery:  Continue Current Diet, Continue Oral Nutrition Supplement  Nutrition Education/Counseling:  Education not indicated   Coordination of Nutrition Care:  Continue to monitor while inpatient    Goals:  Consume >50-75% meals/ONS       Nutrition Monitoring and Evaluation:   Behavioral-Environmental Outcomes:  None Identified   Food/Nutrient Intake Outcomes:  Food and Nutrient Intake, Supplement Intake  Physical Signs/Symptoms Outcomes:  Biochemical Data, Chewing or Swallowing, GI Status, Fluid Status or Edema, Nutrition Focused Physical Findings, Skin, Weight     Discharge Planning:     Too soon to determine     Electronically signed by Leonor Mason RD, LD on 12/1/20 at 9:45 AM EST    Contact: [phone]

## 2020-12-01 NOTE — CARE COORDINATION
ADDENDUM;12/1/2020.1:33 PMcovid positive on 11-. Discharge order noted. Sw spoke with dtr Silvio Rebollar, she has spoken with Dr. Lynne Osorio. Plan is for pt to return to her own home, dtr Sharon will provide transport home today at 4:00 pm.  FACUNDO noted for [place] and liaison notified of orders and discharge. pts medications are to go home with the dtr at discharge. Dtr aware to stop at  to notify the floor when she arrives and pt will be brought down. Family is very thankful for the care of their mother. BERNARDO Zaidi    Ss note:12/1/2020.12:56 PM Pt covid positive on 11-. Discharge order noted. Alejandro contacted pts dtr Sharon to inform that no snfs can be secured for this pt and doctor has placed discharge order. Pt is active with [facility], will need FACUNDO. Dtr Sharon relays she will contact her sister Ryan Wright to see what arrangements they can make for the pt and she will call me back. Pt is on room air.  BERNARDO Zaidi

## 2020-12-03 NOTE — CARE COORDINATION
[facility] Trans[facility] Initial Follow Up Call    Call within 2 business days of discharge: Yes    Patient: Parth Hartman Patient : 1940   MRN: [id number]  Reason for Admission: PNA/COVID-19+  Discharge Date: 20 RARS: Readmission Risk Score: 33      Last Discharge [facility]       Complaint Diagnosis Description Type Department Provider    20 Emesis Pneumonia due to organism . .. ED to Hosp-Admission (Discharged) (ADMITTED) DARIUS 4 Vickie Cota MD        Challenges to be reviewed by the provider   Additional needs identified to be addressed with provider No  none    Discussed COVID-19 related testing which was available at this time. Test results were positive. Patient informed of results, if available? Yes         Method of communication with provider : none    Advance Care Planning:   Does patient have an Advance Directive:  decision maker updated. Was this a readmission? Yes  Patient stated reason for admission: nausea/vomiitng/diarrhea  Patients top risk factors for readmission: medical condition and polypharmacy    Care Transition Nurse (CTN) contacted the family by telephone to perform post hospital discharge assessment. Verified name and  with family as identifiers. Provided introduction to self, and explanation of the CTN role. CTN reviewed discharge instructions, medical action plan and red flags with family who verbalized understanding. Family given an opportunity to ask questions and does not have any further questions or concerns at this time. Were discharge instructions available to patient? Yes. Reviewed appropriate site of care based on symptoms and resources available to patient including: PCP, Home health, When to call 911 and Condition related references. The family agrees to contact the PCP office for questions related to their healthcare.      Medication reconciliation was performed with family, who verbalizes understanding of administration of home medications. Advised obtaining a 90-day supply of all daily and as-needed medications. Covid Risk Education    Patient has following risk factors of: COPD, pneumonia and diabetes. Education provided regarding infection prevention, and signs and symptoms of COVID-19 and when to seek medical attention with family who verbalized understanding. Discussed exposure protocols and quarantine From CDC: Are you at higher risk for severe illness?   and given an opportunity for questions and concerns. The family agrees to contact the COVID-19 hotline 949-014-6412 or PCP office for questions related to COVID-19. For more information on steps you can take to protect yourself, see CDC's How to Protect Yourself     Patient/family/caregiver given information for [facility] [facility] and agrees to enroll yes  Patient's preferred e-mail: [email or web address]  Patient's preferred phone number: [phone]    Discussed follow-up appointments. If no appointment was previously scheduled, appointment scheduling offered: Yes. Is follow up appointment scheduled within 7 days of discharge? No  Non-[facility] follow up appointment(s): Patient daughter/caregiver Flora Greenberg) states she has been in contact with Dr. Adriano Denton (PCP) via phone for follow up d/t COVID-19+    Non-face-to-face services provided:  Scheduled appointment with PCP-CTN confirmed with patient daughter/caregiver Flora Greenberg) that she has been in contact with Dr. Adriano Denton (PCP) via phone d/t COVID-19+  Obtained and reviewed discharge summary and/or continuity of care documents  Education of patient/family/caregiver/guardian to support self-management-Discussed DM/COPD/PNA zone tools/COVID-19 precautions and knowing when to seek medical attention,   Assessment and support for treatment adherence and medication management-CTN advised for patient to take Decadron, Zyvox and Vancomycin as directed until completely finished.  Discussed bleeding precations associated with COumadin therapy and knowing when to call doctor or seek immediate medical attention   Establishment or re-establishment of referrals-CTN confirmed with Sharon that nurse from [facility] was out for visit yesterday and is active with services. Care Transitions 24 Hour Call    Schedule Follow Up Appointment with PCP:  Declined  Do you have any ongoing symptoms?:  Yes  Patient-reported symptoms:  Fatigue, Shortness of Breath, Weakness, Cough, Other (Comment: perineum rash from bowel/bladder incontinence. )  Do you have a copy of your discharge instructions?:  Yes  Do you have all of your prescriptions and are they filled?:  Yes  Have you been contacted by a [address]?:  No  Have you scheduled your follow up appointment?:  Yes  How are you going to get to your appointment?:  Other (Comment: DTR (Sharon) states she has been on contact with Dr. Meredith Mckoy (PCP) via telephone)  Were you discharged with any Home Care or Post Acute Services:  Yes  Post Acute Services:  Home Health (Comment: [facility])  Do you feel like you have everything you need to keep you well at home?:  Yes  Care Transitions Interventions       Spoke with patient daughter/caregiver Hugh Mckinney) today 12/3/20 for hospital discharge/COVID-19+ follow up. Patient provided verbal consent to discuss care with Jessica Gonzalez reports patient continues to be weak and short of breath with exertion. States patient is wearing home oxygen at 2 lpm continuous. States patient has an associated productive cough but no chest pain or chest discomfort. States patient appetite is poor and not wanting to eat/drink. Denies any nausea, vomiting, chills or fever. States last temperature today was 98.0 F at 8 am this morning. Noted patient tested positive for COVID-19 on 11/24/20. Sharon states patient was unable to be placed to facility upon discharge d/t not able to accept. Sharon states patient has perineum rash d/t incontinence of both urine and stool.  States using zinc cream that was sent home from hospital. CTN emphasized the importance to keep area clean and dry. Apply zinc cream after each cleaning and allow air to get to affected area which she verbalizes understanding. Confirmed with Sharon that patient obtained all new  meds ordered discharge. Advised for patient to take Vancomycin, Decadron and Zyvox as directed until completely finished. Emphasized importance to take Lactobacillus while on ABT to prevent good bacteria being washed away from gut with antibiotics which she acknowledges. Discussed bleeding precautions associated with Coumadin therapy and knowing when to call doctor or seek immediate medical attention. Confirmed with Sharon that she has been in contact with Dr. Radha Thompson (PCP) via phone d/t being COVID-19+. States nurse from [facility] was out yesterday for visit and is active with services. Denies any other complaints or concerns at this time. Patient enrolled into Loop Program for continued monitoring. Follow Up  No future appointments. CTN provided contact information for future needs.     Bertina Aschoff, APRN

## 2020-12-09 PROBLEM — J96.21 ACUTE ON CHRONIC RESPIRATORY FAILURE WITH HYPOXIA (HCC): Status: ACTIVE | Noted: 2020-01-01

## 2020-12-10 NOTE — ED NOTES
Pt repositioned in bed hob elevated, pt was suctioned. Pt will be npo due to inability to swallow. Her pulse ox is 85%-90% on high flow nasal cannula. Will continue to monitor.      Bonilla Grider RN  12/10/20 [id number]

## 2020-12-10 NOTE — CONSULTS
[facility]  Consult Note    [address] [facility] [address]  [place], [address]  Phone [phone]   [phone]      Admit Date: 2020  6:55 PM  Pt Name: Ben Rice  MRN: [id number]  : 1940  Reason for Consult:    Chief Complaint   Patient presents with    Shortness of Breath     Recent positive Covid-19 test    Failure To Thrive     Not eating or drinking much per EMS     Requesting Physician:  Ace Garcia DO  PCP: Garret Domínguez MD  History Obtained From:  patient  ID consulted for Acute on chronic respiratory failure with hypoxia ([facility] [address] [J96.21]  on hospital day 0  CHIEF COMPLAINT       Chief Complaint   Patient presents with    Shortness of Breath     Recent positive Covid-19 test    Failure To Thrive     Not eating or drinking much per EMS     HISTORYOF PRESENT ILLNESS      Ben Rice is a 79 y.o. female who presents with significant past medical history of  has a past medical history of Arthritis, Cerebral artery occlusion with cerebral infarction ([facility] [address] Chronic back pain, COPD (chronic obstructive pulmonary disease) ([facility] [address] Diabetes mellitus ([facility] [address] GERD (gastroesophageal reflux disease), H/O cardiovascular stress test, Hyperlipidemia, and Thyroid disease. who presents with   Chief Complaint   Patient presents with    Shortness of Breath     Recent positive Covid-19 test    Failure To Thrive     Not eating or drinking much per EMS     ED TRIAGEVITALS  BP: (!) 168/107, Temp: 97.4 °F (36.3 °C), Pulse: 116, Resp: 21, SpO2: 92 %  HPI  Pt from home with dec o2 sat   She is covid +   She was d/c to home   Last seen on  s/p remdesivir x5 days   On rx for cdiff with vanco po   Had VSE/CONS bacteremia on linezolid til   She came in due to failure to thrive  T97.7 bp 127/84 3L->15L  Per nurse had difficult swallowing pills  cxry COPD with scarring with atelectasis/hazy infiltrative densities lung bases concerning for pneumonia.    Ct head 1.  No acute intracranial abnormality.  Moderate to severe senescent changes. Intracranial atherosclerotic disease.    2.  Mild prominence of ventricular system which is out of proportion to the   degree of gyral atrophy.  Consider normal pressure hydrocephalus in the right  clinical setting  REVIEW OF SYSTEMS    (2-9 systems for level 4, 10 or more for level 5)     REVIEW OFSYSTEMS:    CONSTITUTIONAL:   No fever, chills, weight loss  ALLERGIES:    No urticaria, hay fever,    EYES:     No blurry vision, loss of vision,eye pain  ENT:      No hearing loss, sore throat  CARDIOVASCULAR:  No chest pain or palpitations  RESPIRATORY:   No cough, sob  ENDOCRINE:    No increase thirst, urination   HEME-LYMPH:   No easy bruising or bleeding  GI:     No nausea, vomiting or diarrhea  :     No urinary complaints  NEURO:    No seizures, stroke, HA  MUSCULOSKELETAL:  No muscle aches or pain, no jointpain  SKIN:     No rash or itch  PSYCH:    No depression or anxiety    Not in a hospital admission.'  CURRENT MEDICATIONS     Current Facility-Administered Medications:     acetaminophen (TYLENOL) tablet 500 mg, 500 mg, Oral, Q6H PRN, Cassandra Friend MD    budesonide-formoterol (SYMBICORT) 80-4.5 MCG/ACT inhaler 2 puff, 2 puff, Inhalation, BID, Cassandra Friend MD    montelukast (SINGULAIR) tablet 10 mg, 10 mg, Oral, Nightly, Cassandra Friend MD, 10 mg at 12/10/20 0150    glimepiride (AMARYL) tablet 2 mg, 2 mg, Oral, QAM AC, Cassandra Friend MD, Stopped at 12/10/20 3736    lactobacillus (CULTURELLE) capsule 1 capsule, 1 capsule, Oral, Q12H, Cassandra Friend MD    Vitamin D (CHOLECALCIFEROL) tablet 2,000 Units, 2,000 Units, Oral, Daily, Cassandra Friend MD    rosuvastatin (CRESTOR) tablet 20 mg, 20 mg, Oral, Nightly, Cassandra Friend MD, 20 mg at 12/10/20 0149    losartan (COZAAR) tablet 50 mg, 50 mg, Oral, Nightly, Cassandra Friend MD, 50 mg at 12/10/20 0149    metoprolol succinate (TOPROL XL) extended release tablet 25 mg, 25 mg, Oral, Daily, Cassandra Friend MD    amLODIPine (NORVASC) tablet 5 mg, 5 mg, Oral, Daily, Cassandra Friend MD    calcium-vitamin D (OSCAL-500) 500-200 MG-UNIT per tablet 2 tablet, 2 tablet, Oral, BID, Cassandra Friend MD, 2 tablet at 12/10/20 0700    vancomycin (VANCOCIN) oral solution 125 mg, 125 mg, Oral, 4x daily, Cassandra Friend MD, 125 mg at 12/10/20 0700    levothyroxine (SYNTHROID) tablet 137 mcg, 137 mcg, Oral, Daily, Cassandra Friend MD    vitamin B and C (TOTAL B-C) 1 tablet, 1 tablet, Oral, Daily, Cassandra Friend MD    pantoprazole (PROTONIX) tablet 40 mg, 40 mg, Oral, QAM AC, Cassandra Friend MD    albuterol-ipratropium (COMBIVENT RESPIMAT)  MCG/ACT inhaler 1 puff, 1 puff, Inhalation, Q6H PRN, Cassandra Friend MD    tiotropium (SPIRIVA) inhalation capsule 18 mcg, 18 mcg, Inhalation, Daily, Cassandra Friend MD    ipratropium-albuterol (DUONEB) nebulizer solution 3 ampule, 3 ampule, Inhalation, Once, Oniel Covington DO    Current Outpatient Medications:     OXYGEN, Inhale 2 L into the lungs nightly , Disp: , Rfl:     Calcium Carb-Cholecalciferol (RA CALCIUM PLUS VITAMIN D3 PO), Take by mouth 2 times daily, Disp: , Rfl:     warfarin (COUMADIN) 5 MG tablet, Hold if INR is above or equal to 3.0 (Patient taking differently: Take 7.5 mg by mouth daily Hold if INR is above or equal to 3.0), Disp: 30 tablet, Rfl: 3    Cholecalciferol (VITAMIN D) 25 MCG TABS, Take 3 tablets by mouth daily, Disp: 60 tablet, Rfl: 3    vancomycin (VANCOCIN) 50 mg/mL oral solution, Take 2.5 mLs by mouth 4 times daily for 28 days MAY SUBSTITUTE FOR CAPSULES, Disp: 280 mL, Rfl: 0    lactobacillus (CULTURELLE) CAPS capsule, Take 1 capsule by mouth every 12 hours, Disp: 60 capsule, Rfl: 1    B Complex-C (B-COMPLEX WITH VITAMIN C) tablet, Take 1 tablet by mouth daily, Disp: 30 tablet, Rfl: 3    Nutritional Supplements (ENSURE ORIGINAL) LIQD, Take 1 Can by mouth three times daily , Disp: , Rfl:     acetaminophen (TYLENOL) 500 MG tablet, Take 500 mg by mouth every 6 hours as needed for Pain, Disp: , Rfl:     rosuvastatin (CRESTOR) 20 MG tablet, Take 20 mg by mouth nightly, Disp: , Rfl:     mirabegron (MYRBETRIQ) 25 MG TB24, Take 25 mg by mouth nightly, Disp: , Rfl:     levothyroxine (SYNTHROID) 137 MCG tablet, Take 135 mcg by mouth Daily, Disp: , Rfl:     metoprolol succinate (TOPROL XL) 25 MG extended release tablet, Take 1 tablet by mouth daily, Disp: 30 tablet, Rfl: 3    ipratropium-albuterol (DUONEB) 0.5-2.5 (3) MG/3ML SOLN nebulizer solution, Inhale 3 mLs into the lungs every 6 hours as needed for Shortness of Breath, Disp: 360 mL, Rfl: 0    amLODIPine (NORVASC) 5 MG tablet, Take 1 tablet by mouth daily, Disp: 30 tablet, Rfl: 3    DULoxetine HCl 40 MG CPEP, Take 40 mg by mouth nightly , Disp: , Rfl:     glimepiride (AMARYL) 2 MG tablet, Take 2 mg by mouth every morning (before breakfast), Disp: , Rfl:     omeprazole (PRILOSEC) 40 MG capsule, Take 40 mg by mouth daily, Disp: , Rfl:     fluticasone-salmeterol (ADVAIR) 250-50 MCG/DOSE AEPB, Inhale 1 puff into the lungs every 12 hours, Disp: , Rfl:     tiotropium (SPIRIVA) 18 MCG inhalation capsule, Inhale 18 mcg into the lungs daily. , Disp: , Rfl:     losartan (COZAAR) 50 MG tablet, Take 50 mg by mouth nightly , Disp: , Rfl:     montelukast (SINGULAIR) 10 MG tablet, Take 10 mg by mouth nightly., Disp: , Rfl:   ALLERGIES     Latex;  Codeine; and Morphine  Immunization History   Administered Date(s) Administered    Influenza Virus Vaccine 01/16/2014    Pneumococcal Polysaccharide ([id number]) 01/16/2014    Tdap (Boostrix, Adacel) 03/22/2019     PAST MEDICAL HISTORY     Past Medical History:   Diagnosis Date    Arthritis     joints, knees, back  djd    Cerebral artery occlusion with cerebral infarction ([facility] [address]     Chronic back pain     COPD (chronic obstructive pulmonary disease) (HCC)     Diabetes mellitus (HCC)     GERD (gastroesophageal reflux disease)  H/O cardiovascular stress test 2018    lexiscan stress test    Hyperlipidemia     Thyroid disease      SURGICAL HISTORY       Past Surgical History:   Procedure Laterality Date    APPENDECTOMY      BREAST SURGERY      cyst     CHOLECYSTECTOMY      COLONOSCOPY      ENDOSCOPY, COLON, DIAGNOSTIC      HYSTERECTOMY      JOINT REPLACEMENT      left knee    KNEE ARTHROPLASTY Right 1/15/2014    TOTAL KNEE ARTHROPLASTY    THYROIDECTOMY      TRANSESOPHAGEAL ECHOCARDIOGRAM N/A 2020    TRANSESOPHAGEAL ECHOCARDIOGRAM performed by Dejan Valdez MD at [address]     History reviewed. No pertinent family history.   SOCIAL HISTORY       Social History     Socioeconomic History    Marital status:      Spouse name: None    Number of children: None    Years of education: None    Highest education level: None   Occupational History    None   Social Needs    Financial resource strain: None    Food insecurity     Worry: None     Inability: None    Transportation needs     Medical: None     Non-medical: None   Tobacco Use    Smoking status: Former Smoker     Packs/day: 1.00     Years: 60.00     Pack years: 60.00     Last attempt to quit: 10/18/2020     Years since quittin.1    Smokeless tobacco: Never Used   Substance and Sexual Activity    Alcohol use: No    Drug use: No    Sexual activity: Not Currently   Lifestyle    Physical activity     Days per week: None     Minutes per session: None    Stress: None   Relationships    Social connections     Talks on phone: None     Gets together: None     Attends [employer] service: None     Active member of club or organization: None     Attends meetings of clubs or organizations: None     Relationship status: None    Intimate partner violence     Fear of current or ex partner: None     Emotionally abused: None     Physically abused: None     Forced sexual activity: None   Other Topics Concern    None   Social History Narrative    None     ·  from home    PHYSICAL EXAM    (up to 7 for level 4, 8 or more forlevel 5)     ED Triage Vitals [12/09/20 1904]   BP Temp Temp Source Pulse Resp SpO2 Height Weight   127/84 97.7 °F (36.5 °C) Oral 107 20 94 % 5' 2\" (1.575 m) 123 lb (55.8 kg)     Vitals:    Vitals:    12/10/20 0340 12/10/20 0402 12/10/20 0502 12/10/20 0603   BP: (!) 131/100 131/79 (!) 144/91 (!) 168/107   Pulse: 120 110 110 116   Resp: 21 24 22 21   Temp:  97.4 °F (36.3 °C)     TempSrc:  Oral     SpO2: (!) 84% (!) 89% 97% 92%   Weight:       Height:         Physical Exam   Constitutional/General: Awake NAD  Head: NC/AT  Eyes: PERRL, EOMI  Neck: Supple, full ROM,    Pulmonary: Lungs dec to auscultation bilaterally. Not in respiratory distress  Cardiovascular:  tachy rate and rhythm, no murmurs, gallops, or rubs. Abdomen: Soft, + BS. No distension. Nontender. Extremities: Moves all extremities x 4. Warm and well perfused  Pulses:  Distal pulses intact  Skin: Warm and dry without rash  Neurologic:    weakness  Psych: Normal Affect  Stark yellow urine     DIAGNOSTICRESULTS   RADIOLOGY:   Xr Chest (2 Vw)    Result Date: 11/23/2020  EXAMINATION: TWO XRAY VIEWS OF THE CHEST 11/23/2020 9:05 am COMPARISON: Comparison is dated November 17, 2020 HISTORY: ORDERING SYSTEM PROVIDED HISTORY: Follow-up pneumonia TECHNOLOGIST PROVIDED HISTORY: Reason for exam:->Follow-up pneumonia FINDINGS: Streaky opacity in the right upper lobe apex is unchanged. There is some increased patchy and linear opacity at the right base on the left parahilar region new since the prior study. There is diffuse bilateral interstitial prominence.   Cardiac and mediastinal contours do not appear changed    Increasing bilateral pulmonary infiltrates/atelectasis    Xr Chest (2 Vw)    Result Date: 11/17/2020  EXAMINATION: TWO XRAY VIEWS OF THE CHEST 11/17/2020 6:25 pm COMPARISON: 10/06/2020 HISTORY: ORDERING SYSTEM PROVIDED HISTORY: Weakness/N/V TECHNOLOGIST PROVIDED HISTORY: Reason for exam:->Weakness/N/V FINDINGS: The cardiomediastinal silhouette is mildly enlarged in size and contour. Hyperinflation. Coarsened bibasilar interstitial thickening and left suprahilar opacity. .  New right upper lobe opacity. .  No pleural effusion or pneumothorax is present. Osteopenia. Stable upper lumbar compression fracture. Stable mid thoracic spine compression fracture. Hyperinflation. Coarsened bibasilar interstitial opacities with new right upper lobe airspace disease. Findings could represent interstitial alveolar edema versus developing pneumonia. Follow-up to assure resolution following medical treatment course recommended. Xr Lumbar Spine (2-3 Views)    Result Date: 11/28/2020  EXAMINATION: THREE XRAY VIEWS OF THE LUMBAR SPINE 11/28/2020 12:55 am COMPARISON: CT lumbar spine October 25, 2020. HISTORY: ORDERING SYSTEM PROVIDED HISTORY: fall coccyx TECHNOLOGIST PROVIDED HISTORY: Reason for exam:->fall coccyx FINDINGS: There appears to be slight increased anterior wedging of the previously seen T12 fracture. Unchanged superior endplate compression deformity at L3. Normal alignment. Diffuse osteopenia. 1. There appears to be slight increased anterior wedging of the previously seen T12 fracture. 2. Unchanged superior endplate compression deformity at L3. Xr Sacrum Coccyx (min 2 Views)    Result Date: 11/28/2020  EXAMINATION: THREE XRAY VIEWS OF THE SACRUM/COCCYX 11/28/2020 12:55 am COMPARISON: May 18, 2020. Abraham Ra HISTORY: ORDERING SYSTEM PROVIDED HISTORY: fall TECHNOLOGIST PROVIDED HISTORY: Reason for exam:->fall FINDINGS: Diffuse osteopenia. No obvious sacral fracture. No acute findings and no significant change from prior study.     Ct Head Wo Contrast    Result Date: 12/9/2020  EXAMINATION: CT OF THE HEAD WITHOUT CONTRAST  12/9/2020 9:36 pm TECHNIQUE: CT of the head was performed without the administration of intravenous contrast. Dose modulation, iterative reconstruction, and/or weight based adjustment of the mA/kV was utilized to reduce the radiation dose to as low as reasonably achievable. COMPARISON: CT head from October 6, 2020 HISTORY: ORDERING SYSTEM PROVIDED HISTORY: fatigue TECHNOLOGIST PROVIDED HISTORY: Has a \"code stroke\" or \"stroke alert\" been called? ->No Reason for exam:->fatigue FINDINGS: BRAIN/VENTRICLES: Moderate to severe periventricular and deep white matter hypoattenuating regions are suggestive of areas of chronic microvascular ischemic change. There is no evidence of acute ischemia, hemorrhage, or herniation. The ventricular system is prominent which is slightly out of proportion to the degree of gyral atrophy. Intracranial atherosclerotic disease. ORBITS: The visualized portion of the orbits demonstrate no acute abnormality. SINUSES: The visualized paranasal sinuses and mastoid air cells demonstrate no acute abnormality. SOFT TISSUES/SKULL:  No acute abnormality of the visualized skull or soft tissues. Chronic deviation of the nasal septum. 1.  No acute intracranial abnormality. Moderate to severe senescent changes. Intracranial atherosclerotic disease. 2.  Mild prominence of ventricular system which is out of proportion to the degree of gyral atrophy. Consider normal pressure hydrocephalus in the right clinical setting. Xr Chest Portable    Result Date: 12/9/2020  EXAMINATION: ONE XRAY VIEW OF THE CHEST 12/9/2020 7:26 pm COMPARISON: 11/30/2020 HISTORY: ORDERING SYSTEM PROVIDED HISTORY: failure to thrive TECHNOLOGIST PROVIDED HISTORY: Reason for exam:->failure to thrive FINDINGS: Heart and the mediastinal structures are normal.  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 Strandy and hazy perihilar densities are present with  hazy densities extending the lung bases. COPD with scarring with atelectasis/hazy infiltrative densities lung bases concerning for pneumonia.     Xr Chest Portable    Result Date: 11/30/2020  EXAMINATION: ONE XRAY VIEW OF THE CHEST 11/30/2020 2:07 pm COMPARISON: November 27, 2020 HISTORY: ORDERING SYSTEM PROVIDED HISTORY: Follow-up on pneumonia TECHNOLOGIST PROVIDED HISTORY: Reason for exam:->Follow-up on pneumonia FINDINGS: Stable cardiomediastinal silhouette. There is mild pulmonary vascular congestion. Patchy bilateral opacities are not significantly changed. No pleural effusion or pneumothorax seen. There are degenerative changes in the shoulders and spine. 1.  Patchy bilateral opacities are not significantly changed. Differential includes atelectasis and infection. 2.  Mild pulmonary vascular congestion. Xr Chest Portable    Result Date: 11/27/2020  EXAMINATION: ONE XRAY VIEW OF THE CHEST 11/27/2020 1:07 pm COMPARISON: November 23, 2020 HISTORY: ORDERING SYSTEM PROVIDED HISTORY: Pneumonia follow-up TECHNOLOGIST PROVIDED HISTORY: Reason for exam:->Pneumonia follow-up FINDINGS: There is improved aeration of the lung bases since the prior examination. No convincing evidence of a focal consolidative airspace opacity, pneumothorax or pleural effusion. Heart size is unable to be accurately assessed on this single portable view of the chest, but appears to be stable. No acute osseous abnormality. Improved aeration of the lungs compared with the most recent examination. No radiographic evidence of acute cardiopulmonary process on today's study. LABS  Recent Labs     12/09/20  1944 12/10/20  0531   WBC 16.7* 18.7*   HGB 14.6 12.5   HCT 45.0 38.8   MCV 84.1 84.7    226     Recent Labs     12/09/20  1944 12/10/20  0531   * 129*   K 4.2 4.5   CL 89* 94*   CO2 28 26   BUN 21 17   CREATININE 1.0 0.8   GFRAA >60 >60   LABGLOM 53 >60   GLUCOSE 126* 127*   PROT 6.8 6.1*   LABALBU 2.8* 2.1*   CALCIUM 9.2 8.3*   BILITOT 0.3 0.2   ALKPHOS 120* 98   AST 25 37*   ALT 19 17     No results for input(s): PROCAL in the last 72 hours.   Lab Results   Component Value Date    CRP 2.8 (H) 11/25/2020    CRP 4.1 (H) 11/24/2020    CRP 14.9 (H) 12/29/2019     Lab Results   Component Value Date    SEDRATE 86 (H) 12/29/2019    SEDRATE 84 (H) 12/29/2019    SEDRATE 86 (H) 12/27/2019     No results found for: [id number]  Lab Results   Component Value Date    COVID19 Non-Reactive 11/25/2020    COVID19 DETECTED 11/24/2020     COVID-19/TRENT-COV2 LABS  Recent Labs     12/09/20  1944 12/10/20  0531   TROPONINI <0.01  --    INR 7.8* 5.8*   PROTIME 89.7* 66.4*   AST 25 37*   ALT 19 17     Lab Results   Component Value Date    CHOL 180 11/25/2020    TRIG 131 11/25/2020    HDL 67 11/25/2020    LDLCALC 87 11/25/2020    LABVLDL 26 11/25/2020          MICROBIOLOGY:     Cultures :   Lab Results   Component Value Date    BC 5 Days no growth 11/20/2020    BC Beta Lactamase negative 11/17/2020    BC 5 Days- no growth 05/25/2020    ORG Enterococcus faecium 11/17/2020    ORG Enterococcus faecium 11/17/2020    ORG Staphylococcus coagulase-negative 11/17/2020     Lab Results   Component Value Date    BLOODCULT2 5 Days no growth 11/20/2020    BLOODCULT2  11/17/2020     Refer to previous culture for susceptibility results  CXBL2 from 11-17-20 2011 [facility]  11/17/2020     This organism was isolated in one set. Susceptibility testing is not routinely done as this  organism frequently represents skin contamination. Additional testing can be ordered by calling the  Microbiology Department.       ORG Enterococcus faecium 11/17/2020    ORG Enterococcus faecium 11/17/2020    ORG Staphylococcus coagulase-negative 11/17/2020          Urine Culture, Routine   Date Value Ref Range Status   11/17/2020 >100,000 CFU/ml  Final   05/25/2020   Final    <10,000 CFU/mL  Mixed gram positive organisms  Gram negative rods     12/30/2019 Growth not present  Final     MRSA Culture Only   Date Value Ref Range Status   02/19/2016 Methicillin resistant Staph aureus not isolated  Final        Patient is a 79 y.o. female who presented with   Chief Complaint   Patient

## 2020-12-10 NOTE — ED PROVIDER NOTES
80-year-old female brought in by paramedics with concern of failure to thrive. Concern is that she has been short of breath, she was tested positive for Covid about 1 week ago. Has not been eating or drinking very much. Reportedly family called paramedics because she was laying around and not do anything for about a week. Is in the 80s on room air but was placed on 4 L nasal cannula. She reportedly uses nasal cannula oxygen all the time. She does answer simple questions, cannot provide full information but does seem somewhat confused. Patient is awake and alert, answers her name, provide some simple answers to questions but does not seem fully oriented. Appears frail, fatigued, laying on her side in the fetal position. Reportedly this is been ongoing for about 1 week, moderate severity, persistent, most likely chronic and onset gradual in onset. Associated with shortness of breath or Covid diagnosis before. No family history on file. Past Surgical History:   Procedure Laterality Date    APPENDECTOMY      BREAST SURGERY      cyst     CHOLECYSTECTOMY      COLONOSCOPY      ENDOSCOPY, COLON, DIAGNOSTIC      HYSTERECTOMY      JOINT REPLACEMENT      left knee    KNEE ARTHROPLASTY Right 1/15/2014    TOTAL KNEE ARTHROPLASTY    THYROIDECTOMY      TRANSESOPHAGEAL ECHOCARDIOGRAM N/A 11/23/2020    TRANSESOPHAGEAL ECHOCARDIOGRAM performed by Jeromy Tejada MD at [address] [facility]   Unable to perform ROS: Mental status change   Constitutional: Positive for fatigue. Respiratory: Positive for shortness of breath. Negative for chest tightness. Physical Exam  Constitutional:       General: She is not in acute distress. Appearance: She is well-developed. Comments: Awake, frail-appearing, chronically ill   HENT:      Head: Normocephalic and atraumatic.       Mouth/Throat:      Comments: Dry mucous membranes, dry lips and mouth  Eyes:      Conjunctiva/sclera: Conjunctivae normal.      Pupils: Pupils are equal, round, and reactive to light. Neck:      Musculoskeletal: Normal range of motion. Thyroid: No thyromegaly. Cardiovascular:      Rate and Rhythm: Normal rate and regular rhythm. Pulmonary:      Effort: Pulmonary effort is normal. No respiratory distress. Comments: Minimally coarse breath sounds throughout, hypoxic on room air but in the 90s on 4 L nasal cannula  Abdominal:      General: There is no distension. Palpations: Abdomen is soft. Tenderness: There is no abdominal tenderness. There is no guarding or rebound. Musculoskeletal: Normal range of motion. General: No tenderness. Skin:     General: Skin is warm and dry. Findings: No erythema. Neurological:      Cranial Nerves: No cranial nerve deficit. Coordination: Coordination normal.          Procedures     MDM     ED Course as of Dec 09 2238   Wed Dec 09, 2020   2162 Had long conversation with the daughter who reports that the patient has recently been in and out of the hospital.  She fell a few months ago, had compression fractures. She was minimally mobile before this but could get around with a walker. The last month or so she is gotten worse. Developed pneumonia, was admitted to the hospital.  Found to be bacteremic, got C. difficile colitis on top of Covid. She was discharged home with Zyvox but family was unable to get the Zyvox for about 9 days. She just got started on it and infectious disease was expecting her to be finishing this a few days ago. She still is on the oral vancomycin for the C. difficile. However, daughter was not able to get the C. difficile into the patient today. Daughter reports that in the last day her lethargy has significantly increased. [SO]   1933 The increased lethargy and the worsening hypoxia were the most concerning initial findings by family. She was not febrile at home.   Covid test was actually done during her hospitalization in the middle of November.    [SO]   2043 Called to bedside for the hypoxia. Patient on a nonrebreather at this point with oxygen saturations in the 80s. She is constantly pulling at the mask, when she leaves the mask alone she goes up into the 90s but when she is pulling on it she stays in the mid to upper 80s. No respiratory distress, when I going there she asked me to hurry up and leave her alone.    [SO]   2043 EKG: Interpreted by meSinus tachycardia, rate of 111, normal axis, nonspecific ST changes in the lateral leads, no ST elevations, no T wave abnormalities. The ST depressions are in the inferior and lateral leads.    [SO]   2109 Patient still in the mid 80s on a nonrebreather. Still not wanting to keep it in place. Will give DuoNeb treatments, does not appear in any respiratory distress at this time, chronically uses oxygen at home anyway. [SO]   [phone] Spoke with Dr. Cathryn Carpenter who will admit this patient. Discussed the cat scan head findings and possible abnormalities. Also spoke with the daughter, Geovanna Anderson, the plan is for the patient to ultimately go to a facility following this hospitalization.    [SO]      ED Course User Index  [SO] Edith Najjar, DO      .  ED Course as of Dec 09 2239   Wed Dec 09, 2020   9186 Had long conversation with the daughter who reports that the patient has recently been in and out of the hospital.  She fell a few months ago, had compression fractures. She was minimally mobile before this but could get around with a walker. The last month or so she is gotten worse. Developed pneumonia, was admitted to the hospital.  Found to be bacteremic, got C. difficile colitis on top of Covid. She was discharged home with Zyvox but family was unable to get the Zyvox for about 9 days. She just got started on it and infectious disease was expecting her to be finishing this a few days ago. She still is on the oral vancomycin for the C. difficile.   However, daughter was not able to get the C. difficile into the patient today. Daughter reports that in the last day her lethargy has significantly increased. [SO]   1933 The increased lethargy and the worsening hypoxia were the most concerning initial findings by family. She was not febrile at home. Covid test was actually done during her hospitalization in the middle of November.    [SO]   2043 Called to bedside for the hypoxia. Patient on a nonrebreather at this point with oxygen saturations in the 80s. She is constantly pulling at the mask, when she leaves the mask alone she goes up into the 90s but when she is pulling on it she stays in the mid to upper 80s. No respiratory distress, when I going there she asked me to hurry up and leave her alone.    [SO]   2043 EKG: Interpreted by meSinus tachycardia, rate of 111, normal axis, nonspecific ST changes in the lateral leads, no ST elevations, no T wave abnormalities. The ST depressions are in the inferior and lateral leads.    [SO]   2109 Patient still in the mid 80s on a nonrebreather. Still not wanting to keep it in place. Will give DuoNeb treatments, does not appear in any respiratory distress at this time, chronically uses oxygen at home anyway. [SO]   [phone] Spoke with Dr. Kingston Scheuermann who will admit this patient. Discussed the cat scan head findings and possible abnormalities.   Also spoke with the daughter, Joaquim Driscoll, the plan is for the patient to ultimately go to a facility following this hospitalization.    [SO]      ED Course User Index  [SO] Arelis Garcia, DO       --------------------------------------------- PAST HISTORY ---------------------------------------------  Past Medical History:  has a past medical history of Arthritis, Cerebral artery occlusion with cerebral infarction [facility]), Chronic back pain, COPD (chronic obstructive pulmonary disease) ([facility] Diabetes mellitus ([facility] GERD (gastroesophageal reflux disease), H/O cardiovascular stress test, Hyperlipidemia, and Thyroid disease. Past Surgical History:  has a past surgical history that includes Thyroidectomy; Hysterectomy; Appendectomy; Cholecystectomy; Breast surgery; joint replacement; Colonoscopy; Endoscopy, colon, diagnostic; Knee Arthroplasty (Right, 1/15/2014); and transesophageal echocardiogram (N/A, 11/23/2020). Social History:  reports that she quit smoking about 7 weeks ago. She has a 60.00 pack-year smoking history. She has never used smokeless tobacco. She reports that she does not drink alcohol or use drugs. Family History: family history is not on file. The patients home medications have been reviewed. Allergies: Latex;  Codeine; and Morphine    -------------------------------------------------- RESULTS -------------------------------------------------    LABS:  Results for orders placed or performed during the hospital encounter of 12/09/20   CBC auto differential   Result Value Ref Range    WBC 16.7 (H) 4.5 - 11.5 E9/L    RBC 5.35 3.50 - 5.50 E12/L    Hemoglobin 14.6 11.5 - 15.5 g/dL    Hematocrit 45.0 34.0 - 48.0 %    MCV 84.1 80.0 - 99.9 fL    MCH 27.3 26.0 - 35.0 pg    MCHC 32.4 32.0 - 34.5 %    RDW 15.2 (H) 11.5 - 15.0 fL    Platelets 016 966 - 144 E9/L    MPV 10.8 7.0 - 12.0 fL    Neutrophils % 80.6 (H) 43.0 - 80.0 %    Immature Granulocytes % 1.0 0.0 - 5.0 %    Lymphocytes % 12.7 (L) 20.0 - 42.0 %    Monocytes % 5.5 2.0 - 12.0 %    Eosinophils % 0.1 0.0 - 6.0 %    Basophils % 0.1 0.0 - 2.0 %    Neutrophils Absolute 13.48 (H) 1.80 - 7.30 E9/L    Immature Granulocytes # 0.17 E9/L    Lymphocytes Absolute 2.13 1.50 - 4.00 E9/L    Monocytes Absolute 0.92 0.10 - 0.95 E9/L    Eosinophils Absolute 0.02 (L) 0.05 - 0.50 E9/L    Basophils Absolute 0.02 0.00 - 0.20 E9/L   Comprehensive metabolic panel   Result Value Ref Range    Sodium 128 (L) 132 - 146 mmol/L    Potassium 4.2 3.5 - 5.0 mmol/L    Chloride 89 (L) 98 - 107 mmol/L    CO2 28 22 - 29 mmol/L    Anion Gap 11 7 - 16 mmol/L    Glucose 126 (H) 74 - 99 mg/dL    BUN 21 8 - 23 mg/dL    CREATININE 1.0 0.5 - 1.0 mg/dL    GFR Non-African American 53 >=60 mL/min/1.73    GFR African American >60     Calcium 9.2 8.6 - 10.2 mg/dL    Total Protein 6.8 6.4 - 8.3 g/dL    Alb 2.8 (L) 3.5 - 5.2 g/dL    Total Bilirubin 0.3 0.0 - 1.2 mg/dL    Alkaline Phosphatase 120 (H) 35 - 104 U/L    ALT 19 0 - 32 U/L    AST 25 0 - 31 U/L   Troponin   Result Value Ref Range    Troponin <0.01 0.00 - 0.03 ng/mL   Brain Natriuretic Peptide   Result Value Ref Range    Pro- 0 - 450 pg/mL   Lactate, Sepsis   Result Value Ref Range    Lactic Acid, Sepsis 1.5 0.5 - 1.9 mmol/L   Urinalysis   Result Value Ref Range    Color, UA Yellow Straw/Yellow    Clarity, UA Clear Clear    Glucose, Ur Negative Negative mg/dL    Bilirubin Urine Negative Negative    Ketones, Urine Negative Negative mg/dL    Specific Gravity, UA 1.025 1.005 - 1.030    Blood, Urine TRACE (A) Negative    pH, UA 6.5 5.0 - 9.0    Protein, UA >=300 (A) Negative mg/dL    Urobilinogen, Urine 0.2 <2.0 E.U./dL    Nitrite, Urine Negative Negative    Leukocyte Esterase, Urine Negative Negative   Protime-INR   Result Value Ref Range    Protime 89.7 (H) 9.3 - 12.4 sec    INR 7.8 (HH)    Ammonia   Result Value Ref Range    Ammonia <10.0 (L) 11.0 - 51.0 umol/L   CK   Result Value Ref Range    Total CK 25 20 - 180 U/L   Blood Gas, Arterial   Result Value Ref Range    Date Analyzed 20201209     Time Analyzed 2034     Source: Blood Arterial     pH, Blood Gas 7.488 (H) 7.350 - 7.450    PCO2 32.3 (L) 35.0 - 45.0 mmHg    PO2 42.9 (LL) 75.0 - 100.0 mmHg    HCO3 24.0 22.0 - 26.0 mmol/L    B.E. 1.3 -3.0 - 3.0 mmol/L    O2 Sat 82.5 (L) 92.0 - 98.5 %    O2Hb 82.2 (L) 94.0 - 97.0 %    COHb 0.2 0.0 - 1.5 %    MetHb 0.2 0.0 - 1.5 %    O2 Content 16.2 mL/dL    HHb 17.4 (H) 0.0 - 5.0 %    tHb (est) 14.1 11.5 - 16.5 g/dL    Mode NC- 3 L     Comment with readback     Date Of Collection      Time Collected      Pt Temp 37.0 C     ID [id number] Lab [id number]     Critical(s) Notified Called to Dr. Reyes Pagan    Microscopic Urinalysis   Result Value Ref Range    WBC, UA 0-1 0 - 5 /HPF    RBC, UA 1-3 0 - 2 /HPF    Epithelial Cells, UA FEW /HPF    Bacteria, UA RARE (A) None Seen /HPF   EKG 12 Lead   Result Value Ref Range    Ventricular Rate 111 BPM    Atrial Rate 111 BPM    P-R Interval 114 ms    QRS Duration 76 ms    Q-T Interval 334 ms    QTc Calculation (Bazett) 454 ms    P Axis 76 degrees    R Axis 76 degrees    T Axis 90 degrees       RADIOLOGY:  CT HEAD WO CONTRAST   Final Result   1. No acute intracranial abnormality. Moderate to severe senescent changes. Intracranial atherosclerotic disease. 2.  Mild prominence of ventricular system which is out of proportion to the   degree of gyral atrophy. Consider normal pressure hydrocephalus in the right   clinical setting. XR CHEST PORTABLE   Final Result   COPD with scarring with atelectasis/hazy infiltrative densities lung bases   concerning for pneumonia.          ------------------------- NURSING NOTES AND VITALS REVIEWED ---------------------------  Date / Time Roomed:  12/9/2020  6:55 PM  ED Bed Assignment:  05/05    The nursing notes within the ED encounter and vital signs as below have been reviewed.      Patient Vitals for the past 24 hrs:   BP Temp Temp src Pulse Resp SpO2 Height Weight   12/09/20 2222 -- -- -- -- -- 92 % -- --   12/09/20 2207 136/79 97.4 °F (36.3 °C) Oral 110 20 90 % -- --   12/09/20 2043 -- -- -- 118 -- (!) 88 % -- --   12/09/20 1904 127/84 97.7 °F (36.5 °C) Oral 107 20 94 % 5' 2\" (1.575 m) 123 lb (55.8 kg)       Oxygen Saturation Interpretation: Abnormal and Improved after treatment    ------------------------------------------ PROGRESS NOTES ------------------------------------------  Re-evaluation(s):   Patients symptoms show no change  Repeat physical examination is not changed    Counseling:  I have spoken with the daughter, Chandra Roth, and discussed todays results, in addition to providing specific details for the plan of care and counseling regarding the diagnosis and prognosis. Their questions are answered at this time and they are agreeable with the plan of admission. CRITICAL CARE:   34 MINUTES. Please note that the withdrawal or failure to initiate urgent interventions for this patient would likely result in a life threatening deterioration or permanent disability. Accordingly this patient received the above mentioned time, excluding separately billable procedures. --------------------------------- ADDITIONAL PROVIDER NOTES ---------------------------------  Consultations:  Spoke with Dr. Adriano Denton. Discussed case. They will admit the patient. This patient's ED course included: a personal history and physicial examination, re-evaluation prior to disposition, multiple bedside re-evaluations, IV medications, cardiac monitoring, continuous pulse oximetry and complex medical decision making and emergency management    This patient has remained hemodynamically stable during their ED course. Diagnosis:  1. Acute on chronic respiratory failure with hypoxia (HCC)        Disposition:  Patient's disposition: Admit to telemetry  Patient's condition is stable.          Gato Hilario DO  12/09/20 9754

## 2020-12-10 NOTE — ED NOTES
Per EMS patient brought in for SOB and poor oral intake at home.   Pt. had a recent positive Covid-19 test.     Cal Mckeon RN  12/09/20 1920

## 2020-12-10 NOTE — PROGRESS NOTES
CLINICAL PHARMACY NOTE: MEDS TO [address] Select Patient?: No  Total # of Prescriptions Filled: 1   The following medications were delivered to the patient:  · Dexamethasone 6 mg  Total # of Interventions Completed: 2  Time Spent (min): 30    Additional Documentation:

## 2020-12-10 NOTE — PROGRESS NOTES
Room #:     Date: 12/10/2020       Patient Name: Milas Saint  : 1940      MRN: [id number]   Referring Provider:  Dav Hamilton MD    Patient unavailable for physical therapy eval due to hold per nursing due to not appropriate at this time Will attempt PT evaluation at a later time. Thank you.        Lenore Hector, PT

## 2020-12-10 NOTE — H&P
History and Physical      CHIEF COMPLAINT: Respiratory failure and increased somnolence    History of Present Illness:     60-year-old female brought in by paramedics with concern of failure to thrive, respiratory distress and increased somnolence. She was just discharged from the hospital about a week ago. Concern is that she has been short of breath, she was tested positive for Covid on 11/24 while she was Hospitalized and was given remdesivir and has responded well and was discharged home. During that hospitalization she also was positive for C. difficile toxin and Enterococcus VRE in the blood culture. She was discharged on Zyvox but according to family members they never picked the prescription. She Has not been eating or drinking very much. Reportedly family called paramedics because she was laying around and and being so somnolent for about a week. Her pulse ox was in the 80s on room air but was placed on 4 L nasal cannula and then later was placed on BiPAP. She reportedly uses nasal cannula oxygen all the time at home. She appears very sleepy just responding to her name by shaking her hand ,apparently she received some fentanyl earlier as she was pulling up on her BiPAP , history was taken from nursing staff and ER document. Appears frail, fatigued, laying on her side in the fetal position. Reportedly this is been ongoing for about 1 week, moderate severity, persistent, most likely chronic and onset gradual in onset. Covid test is pending  CT scan of the brain did not show any acute ischemic change but showed chronic microvascular ischemic changes and possibility of normal pressure hydrocephalus. She appears to be tachycardic with sinus tachycardia on the monitor with a rate of 140 bpm.  Blood pressure stable and she remains afebrile.   Family has agreed to make patient DNR CCA    Past Medical History:   Diagnosis Date    Arthritis     joints, knees, back  djd    Cerebral artery occlusion with cerebral infarction ([facility] [address]     Chronic back pain     COPD (chronic obstructive pulmonary disease) (HCC)     Diabetes mellitus (HCC)     GERD (gastroesophageal reflux disease)     H/O cardiovascular stress test 11/12/2018    lexiscan stress test    Hyperlipidemia     Thyroid disease        Past Surgical History:   Procedure Laterality Date    APPENDECTOMY      BREAST SURGERY      cyst     CHOLECYSTECTOMY      COLONOSCOPY      ENDOSCOPY, COLON, DIAGNOSTIC      HYSTERECTOMY      JOINT REPLACEMENT      left knee    KNEE ARTHROPLASTY Right 1/15/2014    TOTAL KNEE ARTHROPLASTY    THYROIDECTOMY      TRANSESOPHAGEAL ECHOCARDIOGRAM N/A 11/23/2020    TRANSESOPHAGEAL ECHOCARDIOGRAM performed by Dea Oconnor MD at [place] [address]       Medications Prior to Admission:    Not in a hospital admission. Allergies:    Latex; Codeine; and Morphine    Social History:    reports that she quit smoking about 7 weeks ago. She has a 60.00 pack-year smoking history. She has never used smokeless tobacco. She reports that she does not drink alcohol or use drugs. Family History:   family history is not on file. REVIEW OF SYSTEMS:  As above in the HPI, otherwise negative. PHYSICAL EXAM:    Vitals:  /88   Pulse 145   Temp 97.4 °F (36.3 °C) (Oral)   Resp 20   Ht 5' 2\" (1.575 m)   Wt 123 lb (55.8 kg)   SpO2 92%   BMI 22.50 kg/m²     General: Sleepy, cachectic on BiPAP without apparent distress. HEENT:  Normocephalic, atraumatic. Pupils equal, round, reactive to light. No scleral icterus. No conjunctival injection. Normal lips, teeth, and gums. No nasal discharge. Neck:  Supple  Heart: Sinus tachycardia no murmurs, gallops, or rubs  Lungs: Scattered harsh rhonchi bilaterally, bilat symmetrical expansion, no wheeze, rales,   Abdomen:   Bowel sounds present, soft, nontender, no masses, no organomegaly, no peritoneal signs  Extremities:  No clubbing, cyanosis, or edema  Skin:  Warm and dry, no open lesions or rash  Neuro:  Cranial nerves 2-12 intact, no focal deficits  Breast: deferred  Rectal: deferred  Genitalia:  deferred    LABS:  CBC with Differential:    Lab Results   Component Value Date    WBC 18.7 12/10/2020    RBC 4.58 12/10/2020    HGB 12.5 12/10/2020    HCT 38.8 12/10/2020     12/10/2020    MCV 84.7 12/10/2020    MCH 27.3 12/10/2020    MCHC 32.2 12/10/2020    RDW 15.5 12/10/2020    SEGSPCT 64 02/15/2013    LYMPHOPCT 11.4 12/10/2020    MONOPCT 6.1 12/10/2020    MYELOPCT 0.9 11/11/2018    BASOPCT 0.1 12/10/2020    MONOSABS 1.14 12/10/2020    LYMPHSABS 2.13 12/10/2020    EOSABS 0.04 12/10/2020    BASOSABS 0.02 12/10/2020     CMP:    Lab Results   Component Value Date     12/10/2020    K 4.5 12/10/2020    CL 94 12/10/2020    CO2 26 12/10/2020    BUN 17 12/10/2020    CREATININE 0.8 12/10/2020    GFRAA >60 12/10/2020    LABGLOM >60 12/10/2020    GLUCOSE 127 12/10/2020    GLUCOSE 158 10/03/2011    PROT 6.1 12/10/2020    LABALBU 2.1 12/10/2020    LABALBU 4.0 10/03/2011    CALCIUM 8.3 12/10/2020    BILITOT 0.2 12/10/2020    ALKPHOS 98 12/10/2020    AST 37 12/10/2020    ALT 17 12/10/2020     LDH:    Lab Results   Component Value Date     11/26/2020     PT/INR:    Lab Results   Component Value Date    PROTIME 66.4 12/10/2020    INR 5.8 12/10/2020     U/A:    Lab Results   Component Value Date    COLORU Yellow 12/09/2020    PROTEINU >=300 12/09/2020    PHUR 6.5 12/09/2020    LABCAST RARE 11/11/2018    WBCUA 0-1 12/09/2020    WBCUA 2-5 04/07/2011    RBCUA 1-3 12/09/2020    RBCUA 1-3 01/22/2014    BACTERIA RARE 12/09/2020    CLARITYU Clear 12/09/2020    SPECGRAV 1.025 12/09/2020    LEUKOCYTESUR Negative 12/09/2020    UROBILINOGEN 0.2 12/09/2020    BILIRUBINUR Negative 12/09/2020    BILIRUBINUR NEGATIVE 04/07/2011    BLOODU TRACE 12/09/2020    GLUCOSEU Negative 12/09/2020    GLUCOSEU NEGATIVE 04/07/2011     EXAMINATION:    CT OF THE HEAD WITHOUT CONTRAST  12/9/2020 9:36 pm    TECHNIQUE:    CT of the head was performed without the administration of intravenous    contrast. Dose modulation, iterative reconstruction, and/or weight based    adjustment of the mA/kV was utilized to reduce the radiation dose to as low    as reasonably achievable. COMPARISON:    CT head from October 6, 2020    HISTORY:    ORDERING SYSTEM PROVIDED HISTORY: fatigue    TECHNOLOGIST PROVIDED HISTORY:    Has a \"code stroke\" or \"stroke alert\" been called? ->No    Reason for exam:->fatigue    FINDINGS:    BRAIN/VENTRICLES: Moderate to severe periventricular and deep white matter    hypoattenuating regions are suggestive of areas of chronic microvascular    ischemic change.  There is no evidence of acute ischemia, hemorrhage, or    herniation.  The ventricular system is prominent which is slightly out of    proportion to the degree of gyral atrophy.  Intracranial atherosclerotic    disease. ORBITS: The visualized portion of the orbits demonstrate no acute abnormality. SINUSES: The visualized paranasal sinuses and mastoid air cells demonstrate    no acute abnormality. SOFT TISSUES/SKULL:  No acute abnormality of the visualized skull or soft    tissues.  Chronic deviation of the nasal septum.         Impression    1.  No acute intracranial abnormality.  Moderate to severe senescent changes. Intracranial atherosclerotic disease.     2.  Mild prominence of ventricular system which is out of proportion to the    degree of gyral atrophy.  Consider normal pressure hydrocephalus in the right    clinical setting.           EXAMINATION:    ONE XRAY VIEW OF THE CHEST    12/9/2020 7:26 pm    COMPARISON:    11/30/2020    HISTORY:    ORDERING SYSTEM PROVIDED HISTORY: failure to thrive    TECHNOLOGIST PROVIDED HISTORY:    Reason for exam:->failure to thrive    FINDINGS:    Heart and the mediastinal structures are normal.  There is COPD with scarring    and fibrosis.  Strandy and hazy perihilar densities are present with  hazy    densities extending the lung bases.         Impression    COPD with scarring with atelectasis/hazy infiltrative densities lung bases    concerning for pneumonia. ASSESSMENT:      Active Problems:    Acute on chronic respiratory failure with hypoxia (HCC)  Resolved Problems:    * No resolved hospital problems. *      PLAN:    #1 continue with BiPAP and respiratory support  2. Start IV antibiotics  3. ID consult  4. Awaiting blood cultures and Covid test  5.   Prognosis is guarded        Please note that over 50 minutes was spent in evaluating the patient, review of records and results, discussion with staff/family, etc.      Electronically signed by Lila Friedman MD on 12/10/2020 at 2:41 PM

## 2020-12-10 NOTE — ED NOTES
Pt. Placed on non-rebreather mask per Dr. Alison Garcia instructions.      Waldo Law RN  12/09/20 204

## 2020-12-11 PROBLEM — J96.21 ACUTE ON CHRONIC RESPIRATORY FAILURE WITH HYPOXEMIA (HCC): Status: ACTIVE | Noted: 2020-01-01

## 2020-12-11 NOTE — PROGRESS NOTES
Department of Internal Medicine  General Internal Medicine  Attending Progress Note      Subjective: The patient is very sleepy and somnolent. Josep Hilt Appears comfortable on the BiPAP setting    Objective:  Pt is sleepy not easily awakened    /81   Pulse 101   Temp 97 °F (36.1 °C) (Infrared)   Resp 20   Ht 5' 2\" (1.575 m)   Wt 123 lb (55.8 kg)   SpO2 99%   BMI 22.50 kg/m²     Head and Neck: normal atraumatic, no neck masses, normal thyroid, no jvd    Heart: Mild tachycardia noted,S1, S2 normal, no murmur, click, rub or gallop    Lungs: With severe harsh scattered rhonchi, no wheezing, rales, decreased symmetric air entry, no chest wall deformities or tenderness    Abd: soft, non-tender, without masses or organomegaly    Extrem:  No clubbing, cyanosis, or edema    Neuro: no focal neurological deficit     Skin: No rashes, lesions, or ecchymosis, no ulcers.        Labs:   CBC with Differential:    Lab Results   Component Value Date    WBC 18.7 12/10/2020    RBC 4.58 12/10/2020    HGB 12.5 12/10/2020    HCT 38.8 12/10/2020     12/10/2020    MCV 84.7 12/10/2020    MCH 27.3 12/10/2020    MCHC 32.2 12/10/2020    RDW 15.5 12/10/2020    SEGSPCT 64 02/15/2013    LYMPHOPCT 11.4 12/10/2020    MONOPCT 6.1 12/10/2020    MYELOPCT 0.9 11/11/2018    BASOPCT 0.1 12/10/2020    MONOSABS 1.14 12/10/2020    LYMPHSABS 2.13 12/10/2020    EOSABS 0.04 12/10/2020    BASOSABS 0.02 12/10/2020     CMP:    Lab Results   Component Value Date     12/10/2020    K 4.5 12/10/2020    CL 94 12/10/2020    CO2 26 12/10/2020    BUN 17 12/10/2020    CREATININE 0.8 12/10/2020    GFRAA >60 12/10/2020    LABGLOM >60 12/10/2020    GLUCOSE 127 12/10/2020    GLUCOSE 158 10/03/2011    PROT 6.1 12/10/2020    LABALBU 2.1 12/10/2020    LABALBU 4.0 10/03/2011    CALCIUM 8.3 12/10/2020    BILITOT 0.2 12/10/2020    ALKPHOS 98 12/10/2020    AST 37 12/10/2020    ALT 17 12/10/2020     LDH:    Lab Results   Component Value Date     11/26/2020 Warfarin PT/INR:  No components found for: PTPATWAR, PTINRWAR   SARS-[place] 2 PCR  Positive        Assessment:    Active Problems:    Diabetes mellitus type II, controlled ([facility] [address]    Chronic obstructive pulmonary disease (HCC)    Hyponatremia    Altered mental status    Generalized weakness    Essential hypertension    Mixed hyperlipidemia    Tachycardia    Pneumonia    COVID-19 virus detected    Acute on chronic respiratory failure with hypoxia (HCC)    Acute on chronic respiratory failure with hypoxemia (HCC)  Resolved Problems:    * No resolved hospital problems. *      Plan:  #1 continue current management  2. Convalescent plasma antibody treatment  3.   Prognosis is poor

## 2020-12-11 NOTE — PROGRESS NOTES
Room #:   5998/9116-30  Date: 2020       Patient Name: Anil Regan  : 1940      MRN: [id number]   Referring Provider:  Prashanth Palmer MD    Patient unavailable for physical therapy eval due to hold per nursing due to lethargy, patient on bipap and not approp at this time Will attempt PT evaluation at a later time. Thank you.        Payton Boykin, PT

## 2020-12-11 NOTE — ACP (ADVANCE CARE PLANNING)
Advance Care Planning     Advance Care Planning Activator (Inpatient)  Conversation Note      Date of ACP Conversation: 12/11/2020    Conversation Conducted with: Aquiles Swain    ACP Activator: Ana Gil. [address] [facility] Decision Maker:     [facility] Decision Maker: Primary decision maker Annmarie Mcdonald  [phone] , Secondary Decision maker Aquiles Swain [phone], Supplemental decision maker Hafsa Fierro [phone]  If no Decision Maker listed above or available through scanned documents, then:    If no Authorized Decision Maker has previously been identified, then patient chooses [place]:  \"Who would you like to name as your primary health care decision-maker? \"               Name:   Primary Decision Maker: [place] Ryne  [phone]  \"Can this person be reached easily? \" YES  Who would you like to name as your back-up decision maker? \"   Secondary Decision MakerMayteresa Vuong - [phone]         \"Can this person be reached easily? \" YES  Care Preferences    Ventilation: \"If you were in your present state of health and suddenly became very ill and were unable to breathe on your own, what would your preference be about the use of a ventilator (breathing machine) if it were available to you? \"      Would the patient desire the use of ventilator (breathing machine)?: NO  \"If your health worsens and it becomes clear that your chance of recovery is unlikely, what would your preference be about the use of a ventilator (breathing machine) if it were available to you? \"     Would the patient desire the use of ventilator (breathing machine)?: NO      Resuscitation  \"CPR works best to restart the heart when there is a sudden event, like a heart attack, in someone who is otherwise healthy. Unfortunately, CPR does not typically restart the heart for people who have serious health conditions or who are very sick. \"    \"In the event your heart stopped as a result of an underlying serious health condition, would you want attempts to be made to restart your heart (answer \"yes\" for attempt to resuscitate) or would you prefer a natural death (answer \"no\" for do not attempt to resuscitate)? \" YES       [x] Yes   [] No   Educated Patient / Newt Seagraves regarding differences between Advance Directives and portable DNR orders.     Length of ACP Conversation in minutes:      Conversation Outcomes:  [x] ACP discussion completed  [] Existing advance directive reviewed with patient; no changes to patient's previously recorded wishes  [] New Advance Directive completed  [] Portable Do Not Rescitate prepared for Provider review and signature  [] POLST/POST/MOLST/MOST prepared for Provider review and signature      Follow-up plan:    [] Schedule follow-up conversation to continue planning  [] Referred individual to Provider for additional questions/concerns   [] Advised patient/agent/surrogate to review completed ACP document and update if needed with changes in condition, patient preferences or care setting    [x] This note routed to one or more involved healthcare providers

## 2020-12-11 NOTE — CARE COORDINATION
SS NOTE: COVID POSITIVE 12/10. [facility] declined pt for admission. SW advised pt's dtr Sharon and advised her of the SNF facilities open for COVID pts. Sharon approved for this SW to investigate [facility]. SW placed referrals to [facility] and [facility] [place]- awaiting their response. Hamp Chew Julieta. 12/11/2020.11:06 AM.

## 2020-12-11 NOTE — PROGRESS NOTES
Patient arrived to unit via cart at this time on NRB. Patient changed to Bipap by respiratory, settings 12/6 100% Fio2 continued from ED orders. Patient responsive to stimuli and will perform hand grasps but not answer simple questions.

## 2020-12-11 NOTE — PROGRESS NOTES
[facility] PROGRESS NOTE    F/u SARS-COV-2 infection FACE TO FACE    SUBJECTIVE:  Gumaro Philip, 79 y.o., female     Pt was discussed with care team  On bipapo    ROS:GENERAL-             GENERAL:Temperature:  Current - Temp: 97 °F (36.1 °C);  Max - Temp  Av.7 °F (36.5 °C)  Min: 97 °F (36.1 °C)  Max: 98.4 °F (36.9 °C)  Respiratory Rate : Resp  Av  Min: 20  Max: 26  Pulse Range: Pulse  Av  Min: 101  Max: 128  Blood Pressure Range:  Systolic (56AYY), SGY:612 , Min:122 , UQL:725   ; Diastolic (65YVV), OKO:35, Min:78, Max:81    Pulse ox Range: SpO2  Av.5 %  Min: 98 %  Max: 99 %  24hr I & O:  No intake or output data in the 24 hours ending 20 1119    CONSTITUTIONAL:   Asleep bipap  HEENT:    AT/NC  NECK:     Supple   LUNGS:   Dec bs  CARDIOVASCULAR:   S1 and S2   ABDOMEN:     Normal bowel sounds, non-distended, non-tender   EXTREMITIES:   FROM    SKIN:     NO RASH   CNS:    NAD  t     MEDS:  acetaminophen (TYLENOL) tablet 500 mg, Q6H PRN  budesonide-formoterol (SYMBICORT) 80-4.5 MCG/ACT inhaler 2 puff, BID  montelukast (SINGULAIR) tablet 10 mg, Nightly  glimepiride (AMARYL) tablet 2 mg, QAM AC  lactobacillus (CULTURELLE) capsule 1 capsule, Q12H  Vitamin D (CHOLECALCIFEROL) tablet 2,000 Units, Daily  rosuvastatin (CRESTOR) tablet 20 mg, Nightly  losartan (COZAAR) tablet 50 mg, Nightly  amLODIPine (NORVASC) tablet 5 mg, Daily  calcium-vitamin D (OSCAL-500) 500-200 MG-UNIT per tablet 2 tablet, BID  vancomycin (VANCOCIN) oral solution 125 mg, 4x daily  levothyroxine (SYNTHROID) tablet 137 mcg, Daily  vitamin B and C (TOTAL B-C) 1 tablet, Daily  pantoprazole (PROTONIX) tablet 40 mg, QAM AC  albuterol-ipratropium (COMBIVENT RESPIMAT)  MCG/ACT inhaler 1 puff, Q6H PRN  tiotropium (SPIRIVA) inhalation capsule 18 mcg, Daily  0.9 % sodium chloride bolus, Once  piperacillin-tazobactam (ZOSYN) 3.375 g in dextrose 5 % 50 mL IVPB extended infusion (mini-bag), Q8H  0.9 % sodium chloride infusion, Q8H  fentaNYL (SUBLIMAZE) injection 12.5 mcg, Q3H PRN  metoprolol succinate (TOPROL XL) extended release tablet 50 mg, Daily  zinc sulfate (ZINCATE) capsule 50 mg, Daily  vitamin C (ASCORBIC ACID) tablet 1,000 mg, Daily  ipratropium-albuterol (DUONEB) nebulizer solution 3 ampule, Once          Data:  Lab Results   Component Value Date    COVID19 DETECTED 12/10/2020    COVID19 Non-Reactive 11/25/2020    COVID19 DETECTED 11/24/2020    COVID19 Not Detected 11/18/2020     COVID-19/SARS-COV-2 LABS  Recent Labs     12/09/20  1944 12/10/20  0531   TROPONINI <0.01  --    INR 7.8* 5.8*   PROTIME 89.7* 66.4*   AST 25 37*   ALT 19 17     Lab Results   Component Value Date    CHOL 180 11/25/2020    TRIG 131 11/25/2020    HDL 67 11/25/2020    LDLCALC 87 11/25/2020    LABVLDL 26 11/25/2020     Lab Results   Component Value Date/Time    VITD25 17 (L) 11/25/2020 08:26 AM     Recent Labs     12/09/20  1944 12/10/20  0531   WBC 16.7* 18.7*   HGB 14.6 12.5   HCT 45.0 38.8    226   MCV 84.1 84.7   MCH 27.3 27.3   MCHC 32.4 32.2   RDW 15.2* 15.5*   LYMPHOPCT 12.7* 11.4*   MONOPCT 5.5 6.1   BASOPCT 0.1 0.1   MONOSABS 0.92 1.14*   LYMPHSABS 2.13 2.13   EOSABS 0.02* 0.04*   BASOSABS 0.02 0.02     Recent Labs     12/09/20  1944 12/10/20  0531   * 129*   K 4.2 4.5   CL 89* 94*   CO2 28 26   BUN 21 17   CREATININE 1.0 0.8   GFRAA >60 >60   LABGLOM 53 >60   GLUCOSE 126* 127*   PROT 6.8 6.1*   LABALBU 2.8* 2.1*   CALCIUM 9.2 8.3*   BILITOT 0.3 0.2   ALKPHOS 120* 98   AST 25 37*   ALT 19 17     U/A:    Lab Results   Component Value Date    COLORU Yellow 12/09/2020    PROTEINU >=300 12/09/2020    PHUR 6.5 12/09/2020    LABCAST RARE 11/11/2018    WBCUA 0-1 12/09/2020    WBCUA 2-5 04/07/2011    RBCUA 1-3 12/09/2020    RBCUA 1-3 01/22/2014    BACTERIA RARE 12/09/2020    CLARITYU Clear 12/09/2020    SPECGRAV 1.025 12/09/2020    LEUKOCYTESUR Negative 12/09/2020    UROBILINOGEN 0.2 12/09/2020    BILIRUBINUR Negative 12/09/2020    BILIRUBINUR NEGATIVE 04/07/2011    BLOODU TRACE 12/09/2020    GLUCOSEU Negative 12/09/2020    GLUCOSEU NEGATIVE 04/07/2011        MICRO  Blood cultures   Blood Culture, Routine   Date Value Ref Range Status   12/09/2020 24 Hours no growth  Preliminary   11/20/2020 5 Days no growth  Final   11/17/2020 Beta Lactamase negative  Final       ASSESSMENT:    Active Hospital Problems    Diagnosis Date Noted    Acute on chronic respiratory failure with hypoxemia (HCC) [J96.21] 12/11/2020    Acute on chronic respiratory failure with hypoxia (HCC) [J96.21] 12/09/2020       SARS-COV-2- positive with pneumonia  bipap  COVID-19 Convalescent Plasma ordered  leukocytosis  cdiff  Sp rx VSE bacteremia  · Remdesivir   · Actemra  · Convalescent plasma   · DECADRON 6MG  qday    · Vitamins   · Recommend proning, side to side positions  · Is   · Inc activity     Labs in am     vancomycin (VANCOCIN) oral solution 125 mg, 4x daily     piperacillin-tazobactam (ZOSYN) 3.375 g in dextrose 5 % 50 mL IVPB extended infusion (mini-bag), Q8H            Follow COVID-19/SARS-COV-2- BIOMARKERS     Crp  Procal  Ferritin  Ldh  Troponin   Ddimer  Fibrinogen:  Inr  PROTIME  Baseline triglyceride/LIPID PANEL   DVT prophylaxis/TREATMENT    PLAN: CONTINUE CURRENT MEDICATIONS AND SUPPORTIVE CARE. Thank you for involving me in the care of Shree Guajardo. Please do not hesitate to call for any questions or concerns.     Electronically signed by Que Lopez MD on 12/11/2020 at 11:19 AM    Phone [phone]  Fax [phone]

## 2020-12-11 NOTE — PROGRESS NOTES
Occupational Therapy  OT SESSION ATTEMPT     Date:2020  Patient Name: Linsey Garcia  MRN: [id number]  : 1940  Room:      Attempted OT session this date:    [] unavailable due to other medical staff currently with pt   [x] on hold per nursing staff - pt on BIPAP and very lethargic, unlikely to participate in therapy. [] on hold per nursing staff secondary to lab / radiology results    [] pt declined due to ____. Benefits of participation in therapy reviewed with pt.    [] off unit   [] Other:     Will reattempt OT evaluation and/or treatment at a later time.     Rosemarie Cosme, OTR/L #[id number]

## 2020-12-12 NOTE — PROGRESS NOTES
· Ideal Body Weight: 110 lbs; % Ideal Body Weight 111.8 %   · BMI: 22.5  · Adjusted Body Weight:  ; No Adjustment   · BMI Categories: Normal Weight (BMI 22.0 to 24.9) age over 72       Nutrition Diagnosis:   · Moderate malnutrition, In context of chronic illness related to cognitive or neurological impairment as evidenced by intake 0-25%, poor intake prior to admission, wounds, weight loss greater than or equal to 5% in 1 month, mild loss of subcutaneous fat, mild muscle loss      Nutrition Interventions:   Food and/or Nutrient Delivery:  Continue Current Diet, Start Oral Nutrition Supplement  Nutrition Education/Counseling:  No recommendation at this time   Coordination of Nutrition Care:  Continue to monitor while inpatient    Goals:  Pt to consume >50% meals/ONS       Nutrition Monitoring and Evaluation:   Behavioral-Environmental Outcomes:  None Identified   Food/Nutrient Intake Outcomes:  Food and Nutrient Intake, Supplement Intake  Physical Signs/Symptoms Outcomes:  Biochemical Data, Chewing or Swallowing, Diarrhea, Fluid Status or Edema, Hemodynamic Status, Nutrition Focused Physical Findings, Skin, Weight     Discharge Planning:     Too soon to determine     Electronically signed by Mckinley Hayes RD, LD on 12/12/20 at 12:43 PM EST    Contact: [phone]

## 2020-12-12 NOTE — PROGRESS NOTES
Department of Internal Medicine  General Internal Medicine  Attending Progress Note      Subjective: The patient is drowsy . Not able to communicate not taking any oral meds or food  Objective:  Pt drowsy, confused pushing on her oxygen mask    BP (!) 145/81   Pulse 118   Temp 97.9 °F (36.6 °C) (Infrared)   Resp 18   Ht 5' 2\" (1.575 m)   Wt 123 lb (55.8 kg)   SpO2 93%   BMI 22.50 kg/m²       Labs:   CBC:   Lab Results   Component Value Date    WBC 20.9 12/11/2020    RBC 4.31 12/11/2020    HGB 11.7 12/11/2020    HCT 36.7 12/11/2020    MCV 85.2 12/11/2020    MCH 27.1 12/11/2020    MCHC 31.9 12/11/2020    RDW 15.7 12/11/2020     12/11/2020    MPV 11.3 12/11/2020     CMP:    Lab Results   Component Value Date     12/11/2020    K 3.5 12/11/2020     12/11/2020    CO2 20 12/11/2020    BUN 22 12/11/2020    CREATININE 1.1 12/11/2020    GFRAA 58 12/11/2020    LABGLOM 48 12/11/2020    GLUCOSE 137 12/11/2020    GLUCOSE 158 10/03/2011    PROT 5.6 12/11/2020    LABALBU 1.9 12/11/2020    LABALBU 4.0 10/03/2011    CALCIUM 8.7 12/11/2020    BILITOT 0.3 12/11/2020    ALKPHOS 91 12/11/2020    AST 39 12/11/2020    ALT 18 12/11/2020      Patient received convalescent plasma  Currently on Decadron      Assessment:  Worsening Covid 19 pneumonia  Respiratory failure  Leukocytosis secondary to steroids  Active Problems:    Diabetes mellitus type II, controlled (HCC)    Chronic obstructive pulmonary disease (HCC)    Hyponatremia    Altered mental status    Generalized weakness    Essential hypertension    Mixed hyperlipidemia    Tachycardia    Pneumonia    COVID-19 virus detected    Acute on chronic respiratory failure with hypoxia (HCC)    Acute on chronic respiratory failure with hypoxemia (HCC)  Resolved Problems:    * No resolved hospital problems. *      Plan:  1. Nutritional support with Ensure  2. Continue supportive measure   3. BiPAP  4. DNR CC per family  5.  Prognosis guarded  See orders Electronically signed by Ariel Tinsley MD on 12/12/2020 at 4:37 PM

## 2020-12-12 NOTE — PROGRESS NOTES
Physical Therapy Initial Evaluation    Room #:  4728/4153-52  Patient Name: Parth Hartman  YOB: 1940  MRN: [id number]    Referring Provider:   Mary Belcher MD     Date of Service: 12/12/2020    Evaluating Physical Therapist:  Varun Brooks, PT #[id number]     Diagnosis:   Acute on chronic respiratory failure with hypoxia (HCC) [J96.21]  Acute on chronic respiratory failure with hypoxemia ([address] [J96.21]       Patient Active Problem List   Diagnosis    Breast mass, right    DJD (degenerative joint disease) of knee    Knee pain    Iron (Fe) deficiency anemia    Diabetes mellitus type II, controlled ([address]    Chronic obstructive pulmonary disease (HCC)    Atelectasis    Hyponatremia    S/P total knee arthroplasty    Status post total bilateral knee replacement    Localized osteoarthrosis, lower leg    Failed total left knee replacement (HCC)    Altered mental status    Generalized weakness    Weakness of left lower extremity    Closed nondisplaced fracture of acromial end of right clavicle    Intertrochanteric fracture of right hip, closed, initial encounter ([address]    Essential hypertension    Mixed hyperlipidemia    Bronchospasm    Tachycardia    Traumatic arthritis of wrist    Sepsis secondary to cat scratch ([address]    Pneumonia    C. difficile colitis    Acute urinary tract infection    Fluid overload    Lethargic    COVID-19 virus detected    Acute on chronic respiratory failure with hypoxia ([address]    Acute on chronic respiratory failure with hypoxemia ([address]       Tentative placement recommendation: [place]   Equipment recommendation:  To be determined      Prior Level of Function: Patient ambulated with wheeled walker   Rehab Potential: fair  for baseline    Past medical history:   Past Medical History:   Diagnosis Date    Arthritis     joints, knees, back  djd    Cerebral artery occlusion with cerebral infarction ([address]     Chronic back pain Was pt agreeable to Eval/treatment? Yes   To be met in 5 days   Pain level   0/10       Bed Mobility    Rolling: Minimal assist of 1   Supine to sit: Moderate assist of 1   Sit to supine: Moderate assist of 1   Scooting: Moderate assist of 1   Rolling: Independent   Supine to sit: Independent   Sit to supine: Independent   Scooting: Independent    Transfers Sit to stand:  Did not stand; sat at bedside to tolerance (~5 min)  Sit to stand: Minimal assist of 2    Ambulation    not assessed    50 feet using  wheeled walker with Minimal assist of 2    Stair negotiation: ascended and descended   Not assessed      1 step min +2   ROM Within functional limits   N/A   Strength BUE:  4/5  RLE:  4/5  LLE:  4/5  Increase strength in affected mm groups by 1/3 grade   Balance Sitting EOB:  fair   Dynamic Standing:  not assessed   Sitting EOB:  good   Dynamic Standing: fair      Patient is Alert & Oriented x person, place, time and situation and follows directions , however she frequently pulls her bipap  Sensation:  Patient  denies numbness and tingling     Edema:  no   Endurance: poor    Vitals:   Heart Rate at rest 119 Heart Rate during session 126   SPO2 at rest 69% without bipap upon entering room; 97 with bipap SPO2 during session 83%     Patient education  Patient educated on role of Physical Therapy, risks of immobility, safety and plan of care, energy conservation, importance of positional changes for oxygen exchange,  importance of mobility while in hospital  and bipap instructions     Patient response to education:   Pt verbalized understanding Pt demonstrated skill Pt requires further education in this area   Yes Partial Yes      Treatment:  Patient practiced and was instructed/facilitated in the following treatment: Patient B UE and LE AROM x 10 ea and  Sat edge of bed 5 minutes with Minimal assist of 1 to increase dynamic sitting balance and activity tolerance.  cues to stay seated as long as possible At end of session, patient in bed with call light and phone within reach,  all lines and tubes intact, nursing notified. Sitter in room. Patient would benefit from continued skilled Physical Therapy to improve functional independence and quality of life. Patient's/ family goals   none stated      ASSESSMENT: Patient exhibits decreased strength, balance, coordination impairing functional mobility. Plan of Care:     -Bed Mobility: Lower extremity exercises  and Partial practice to incorporate into full roll  -Sitting Balance: Incorporate reaching activities to activate trunk muscles   -Standing Balance: Perform strengthening exercises in standing to promote motor control with or without upper extremity support   -Transfers: Provide instruction on proper hand and foot position for adequate transfer of weight onto lower extremities and use of gait device and Support transfer of weight on to lower extremities  -Gait: Gait training and Standing activities to improve: base of support, weight shift, weight bearing    -Endurance: Utilize Supervised activities to increase level of endurance to allow for safe functional mobility including transfers and gait     Patient and or family understand(s) diagnosis, prognosis, and plan of care. Frequency of treatments: Patient will be seen  daily. Time in  0845  Time out  0930    Total Treatment Time  45 minutes    Evaluation time includes thorough review of current medical information, gathering information on past medical history/social history and prior level of function, completion of standardized testing/informal observation of tasks, assessment of data, and development of Plan of care and goals. CPT codes:   Moderate Complexity PT evaluation (91921)  Therapeutic activities (93258)   25 minutes  2 unit(s)    Jazlyn Number, PT

## 2020-12-12 NOTE — CONSULTS
Pulmonary/Critical Care Consult Note    CHIEF COMPLAINT: COVID-19 pneumonitis, acute respiratory failure, poor oral intake, history of vancomycin sensitive Enterococcus, history of COPD    HISTORY OF PRESENT ILLNESS: Patient is a 78-year-old male who was admitted from the emergency room recently been in the hospital for dehydration and not feeling well. She was found to be Covid positive and also has a history of vancomycin sensitive Enterococcus infection. The patient uses oxygen at home but was found to be in the 80% range on room air. Her chest x-ray which I have reviewed shows evidence of interstitial infiltrates probably consistent with Covid pneumonitis. She has been placed on 100% BiPAP and has been saturating 92 to 93%. She is seen by the infectious disease service and is receiving intravenous dexamethasone, intravenous remdesivir, treatment for for HCAP and aspiration and vitamin D as well as other immune enhancing vitamins and minerals. Factious disease service has sought to also treat her for possible C. difficile infection with oral vancomycin and Culturelle. ALLERGY:  Latex; Codeine; and Morphine    FAMILY HISTORY:  History reviewed. No pertinent family history.     SOCIAL HISTORY:  Social History     Socioeconomic History    Marital status:      Spouse name: Not on file    Number of children: Not on file    Years of education: Not on file    Highest education level: Not on file   Occupational History    Not on file   Social Needs    Financial resource strain: Not on file    Food insecurity     Worry: Not on file     Inability: Not on file    Transportation needs     Medical: Not on file     Non-medical: Not on file   Tobacco Use    Smoking status: Former Smoker     Packs/day: 1.00     Years: 60.00     Pack years: 60.00     Last attempt to quit: 10/18/2020     Years since quittin.1    Smokeless tobacco: Never Used   Substance and Sexual Activity    Alcohol use: No    Drug 1,000 mg Oral Daily    ipratropium-albuterol  3 ampule Inhalation Once      dextrose      sodium chloride Stopped (12/11/20 1640)     fentanNYL, glucose, dextrose, glucagon (rDNA), dextrose, acetaminophen, albuterol-ipratropium    REVIEW OF SYSTEMS:    Nonverbal and cannot give oral answers to questions regarding review of systems  PHYSICAL EXAM:  Vitals:    12/11/20 1700   BP:    Pulse:    Resp: 20   Temp:    SpO2:      FiO2 : 100 %  O2 Flow Rate (L/min): 15 L/min  O2 Device: PAP (positive airway pressure)    Constitutional: No fever, chills, diaphoresis  Skin: Skin rash, no skin breakdown  HEENT: Unremarkable  Neck: No JVD, lymphadenopathy, thyromegaly  Cardiovascular: S1, S2 normal.  No S3 murmurs rubs present  Respiratory: Inspiratory crackles over both posterior lung fields  Gastrointestinal: Soft, flat, nontender  Genitourinary: No CVA tenderness obvious  Extremities: No clubbing, cyanosis, or edema  Neurological: Poorly responsive  Psychological: Valuate    LABS:  WBC   Date Value Ref Range Status   12/11/2020 20.9 (H) 4.5 - 11.5 E9/L Final   12/10/2020 18.7 (H) 4.5 - 11.5 E9/L Final   12/09/2020 16.7 (H) 4.5 - 11.5 E9/L Final     Hemoglobin   Date Value Ref Range Status   12/11/2020 11.7 11.5 - 15.5 g/dL Final   12/10/2020 12.5 11.5 - 15.5 g/dL Final   12/09/2020 14.6 11.5 - 15.5 g/dL Final     Hematocrit   Date Value Ref Range Status   12/11/2020 36.7 34.0 - 48.0 % Final   12/10/2020 38.8 34.0 - 48.0 % Final   12/09/2020 45.0 34.0 - 48.0 % Final     MCV   Date Value Ref Range Status   12/11/2020 85.2 80.0 - 99.9 fL Final   12/10/2020 84.7 80.0 - 99.9 fL Final   12/09/2020 84.1 80.0 - 99.9 fL Final     Platelets   Date Value Ref Range Status   12/11/2020 255 130 - 450 E9/L Final   12/10/2020 226 130 - 450 E9/L Final   12/09/2020 299 130 - 450 E9/L Final     Sodium   Date Value Ref Range Status   12/11/2020 134 132 - 146 mmol/L Final   12/10/2020 129 (L) 132 - 146 mmol/L Final   12/09/2020 128 (L) 132 - 146 mmol/L Final     Potassium   Date Value Ref Range Status   12/09/2020 4.2 3.5 - 5.0 mmol/L Final   11/30/2020 4.4 3.5 - 5.0 mmol/L Final   11/29/2020 3.8 3.5 - 5.0 mmol/L Final     Potassium reflex Magnesium   Date Value Ref Range Status   12/11/2020 3.5 3.5 - 5.0 mmol/L Final   12/10/2020 4.5 3.5 - 5.0 mmol/L Final     Comment:     Specimen is moderately Hemolyzed. Result may be artificially increased.    10/06/2020 4.4 3.5 - 5.0 mmol/L Final     Chloride   Date Value Ref Range Status   12/11/2020 100 98 - 107 mmol/L Final   12/10/2020 94 (L) 98 - 107 mmol/L Final   12/09/2020 89 (L) 98 - 107 mmol/L Final     CO2   Date Value Ref Range Status   12/11/2020 20 (L) 22 - 29 mmol/L Final   12/10/2020 26 22 - 29 mmol/L Final   12/09/2020 28 22 - 29 mmol/L Final     BUN   Date Value Ref Range Status   12/11/2020 22 8 - 23 mg/dL Final   12/10/2020 17 8 - 23 mg/dL Final   12/09/2020 21 8 - 23 mg/dL Final     CREATININE   Date Value Ref Range Status   12/11/2020 1.1 (H) 0.5 - 1.0 mg/dL Final   12/10/2020 0.8 0.5 - 1.0 mg/dL Final   12/09/2020 1.0 0.5 - 1.0 mg/dL Final     Glucose   Date Value Ref Range Status   12/11/2020 137 (H) 74 - 99 mg/dL Final   12/10/2020 127 (H) 74 - 99 mg/dL Final   12/09/2020 126 (H) 74 - 99 mg/dL Final   10/03/2011 158 (H) 70 - 110 mg/dL Final   07/08/2011 173 (H) 70 - 110 mg/dL Final   04/07/2011 113 (H) 70 - 110 mg/dL Final     Calcium   Date Value Ref Range Status   12/11/2020 8.7 8.6 - 10.2 mg/dL Final   12/10/2020 8.3 (L) 8.6 - 10.2 mg/dL Final   12/09/2020 9.2 8.6 - 10.2 mg/dL Final     Total Protein   Date Value Ref Range Status   12/11/2020 5.6 (L) 6.4 - 8.3 g/dL Final   12/10/2020 6.1 (L) 6.4 - 8.3 g/dL Final   12/09/2020 6.8 6.4 - 8.3 g/dL Final     Albumin   Date Value Ref Range Status   10/03/2011 4.0 3.2 - 4.8 g/dL Final   07/08/2011 4.1 3.2 - 4.8 g/dL Final   04/07/2011 4.1 3.2 - 4.8 g/dL Final     Alb   Date Value Ref Range Status   12/11/2020 1.9 (L) 3.5 - 5.2 g/dL Final 12/10/2020 2.1 (L) 3.5 - 5.2 g/dL Final   12/09/2020 2.8 (L) 3.5 - 5.2 g/dL Final     Total Bilirubin   Date Value Ref Range Status   12/11/2020 0.3 0.0 - 1.2 mg/dL Final   12/10/2020 0.2 0.0 - 1.2 mg/dL Final   12/09/2020 0.3 0.0 - 1.2 mg/dL Final     Alkaline Phosphatase   Date Value Ref Range Status   12/11/2020 91 35 - 104 U/L Final   12/10/2020 98 35 - 104 U/L Final   12/09/2020 120 (H) 35 - 104 U/L Final     AST   Date Value Ref Range Status   12/11/2020 39 (H) 0 - 31 U/L Final   12/10/2020 37 (H) 0 - 31 U/L Final     Comment:     Specimen is moderately Hemolyzed. Result may be artificially increased. 12/09/2020 25 0 - 31 U/L Final     Comment:     Specimen is slightly Hemolyzed. Result may be artificially increased. ALT   Date Value Ref Range Status   12/11/2020 18 0 - 32 U/L Final   12/10/2020 17 0 - 32 U/L Final   12/09/2020 19 0 - 32 U/L Final     GFR Non-   Date Value Ref Range Status   12/11/2020 48 >=60 mL/min/1.73 Final     Comment:     Chronic Kidney Disease: less than 60 ml/min/1.73 sq.m. Kidney Failure: less than 15 ml/min/1.73 sq.m. Results valid for patients 18 years and older. 12/10/2020 >60 >=60 mL/min/1.73 Final     Comment:     Chronic Kidney Disease: less than 60 ml/min/1.73 sq.m. Kidney Failure: less than 15 ml/min/1.73 sq.m. Results valid for patients 18 years and older. 12/09/2020 53 >=60 mL/min/1.73 Final     Comment:     Chronic Kidney Disease: less than 60 ml/min/1.73 sq.m. Kidney Failure: less than 15 ml/min/1.73 sq.m. Results valid for patients 18 years and older.        GFR    Date Value Ref Range Status   12/11/2020 58  Final   12/10/2020 >60  Final   12/09/2020 >60  Final     Magnesium   Date Value Ref Range Status   12/11/2020 1.4 (L) 1.6 - 2.6 mg/dL Final   12/30/2019 0.4 (LL) 1.6 - 2.6 mg/dL Final   11/14/2018 1.2 (L) 1.6 - 2.6 mg/dL Final     Phosphorus   Date Value Ref Range Status   12/30/2019 2.9 2.5 - 4.5 mg/dL Final     Recent Labs     12/09/20  2034  12/10/20  0836   PH 7.488*   < > 7.458*   PO2 42.9*  --   --    PCO2 32.3*  --   --    HCO3 24.0  --   --    BE 1.3   < > -0.4   O2SAT 82.5*   < > 68.0*    < > = values in this interval not displayed. RADIOLOGY:  CT HEAD WO CONTRAST   Final Result   1. No acute intracranial abnormality. Moderate to severe senescent changes. Intracranial atherosclerotic disease. 2.  Mild prominence of ventricular system which is out of proportion to the   degree of gyral atrophy. Consider normal pressure hydrocephalus in the right   clinical setting. XR CHEST PORTABLE   Final Result   COPD with scarring with atelectasis/hazy infiltrative densities lung bases   concerning for pneumonia. IMPRESSION:  1. COVID-19 pneumonitis  2. Enterococcal bacteremia  3. Possible recurrent C. difficile infection  4. Bacteriuria with E. coli    PLAN:  1. Patient is receiving multiple antibiotics both oral and intravenous as per the infectious disease service  2. Continue remdesivir and dexamethasone  3. We will continue FiO2 of 100% until saturations move well into the 90s. 4. Then can titrate FiO2 downward  5.  Aerosolized bronchodilators        Electronically signed by Edmundo Matute MD on 12/11/2020 at 7:08 PM

## 2020-12-12 NOTE — PROGRESS NOTES
  vancomycin (VANCOCIN) oral solution 125 mg, 4x daily      levothyroxine (SYNTHROID) tablet 137 mcg, Daily      vitamin B and C (TOTAL B-C) 1 tablet, Daily      pantoprazole (PROTONIX) tablet 40 mg, QAM AC      albuterol-ipratropium (COMBIVENT RESPIMAT)  MCG/ACT inhaler 1 puff, Q6H PRN      tiotropium (SPIRIVA) inhalation capsule 18 mcg, Daily      0.9 % sodium chloride bolus, Once      piperacillin-tazobactam (ZOSYN) 3.375 g in dextrose 5 % 50 mL IVPB extended infusion (mini-bag), Q8H      0.9 % sodium chloride infusion, Q8H      metoprolol succinate (TOPROL XL) extended release tablet 50 mg, Daily      zinc sulfate (ZINCATE) capsule 50 mg, Daily      vitamin C (ASCORBIC ACID) tablet 1,000 mg, Daily          Data:  Lab Results   Component Value Date    COVID19 DETECTED 12/10/2020    COVID19 Non-Reactive 11/25/2020    COVID19 DETECTED 11/24/2020    COVID19 Not Detected 11/18/2020     COVID-19/SARS-COV-2 LABS  Recent Labs     12/09/20  1944 12/10/20  0531 12/11/20  1440   TROPONINI <0.01  --   --    INR 7.8* 5.8* 1.4   PROTIME 89.7* 66.4* 15.5*   AST 25 37* 39*   ALT 19 17 18     Lab Results   Component Value Date    CHOL 180 11/25/2020    TRIG 131 11/25/2020    HDL 67 11/25/2020    LDLCALC 87 11/25/2020    LABVLDL 26 11/25/2020     Lab Results   Component Value Date/Time    VITD25 17 (L) 11/25/2020 08:26 AM     Recent Labs     12/09/20  1944 12/10/20  0531 12/11/20  1440   WBC 16.7* 18.7* 20.9*   HGB 14.6 12.5 11.7   HCT 45.0 38.8 36.7    226 255   MCV 84.1 84.7 85.2   MCH 27.3 27.3 27.1   MCHC 32.4 32.2 31.9*   RDW 15.2* 15.5* 15.7*   LYMPHOPCT 12.7* 11.4* 8.4*   MONOPCT 5.5 6.1 4.9   BASOPCT 0.1 0.1 0.1   MONOSABS 0.92 1.14* 1.03*   LYMPHSABS 2.13 2.13 1.76   EOSABS 0.02* 0.04* 0.00*   BASOSABS 0.02 0.02 0.02     Recent Labs     12/09/20  1944 12/09/20  1944 12/10/20  0531 12/11/20  1440   *  --  129* 134   K 4.2   < > 4.5 3.5   CL 89*  --  94* 100   CO2 28  --  26 20* BUN 21  --  17 22   CREATININE 1.0  --  0.8 1.1*   GFRAA >60  --  >60 58   LABGLOM 53  --  >60 48   GLUCOSE 126*  --  127* 137*   PROT 6.8  --  6.1* 5.6*   LABALBU 2.8*  --  2.1* 1.9*   CALCIUM 9.2  --  8.3* 8.7   BILITOT 0.3  --  0.2 0.3   ALKPHOS 120*  --  98 91   AST 25  --  37* 39*   ALT 19  --  17 18    < > = values in this interval not displayed.      U/A:    Lab Results   Component Value Date    COLORU Yellow 12/09/2020    PROTEINU >=300 12/09/2020    PHUR 6.5 12/09/2020    LABCAST RARE 11/11/2018    WBCUA 0-1 12/09/2020    WBCUA 2-5 04/07/2011    RBCUA 1-3 12/09/2020    RBCUA 1-3 01/22/2014    BACTERIA RARE 12/09/2020    CLARITYU Clear 12/09/2020    SPECGRAV 1.025 12/09/2020    LEUKOCYTESUR Negative 12/09/2020    UROBILINOGEN 0.2 12/09/2020    BILIRUBINUR Negative 12/09/2020    BILIRUBINUR NEGATIVE 04/07/2011    BLOODU TRACE 12/09/2020    GLUCOSEU Negative 12/09/2020    GLUCOSEU NEGATIVE 04/07/2011        MICRO  Blood cultures   Blood Culture, Routine   Date Value Ref Range Status   12/09/2020 24 Hours no growth  Preliminary   11/20/2020 5 Days no growth  Final   11/17/2020 Beta Lactamase negative  Final       ASSESSMENT:    Active Hospital Problems    Diagnosis Date Noted    Chronic obstructive pulmonary disease ([facility] [J44.9] 01/19/2014     Priority: High     Class: Chronic    Diabetes mellitus type II, controlled ([facility] [E11.9] 01/19/2014     Priority: High     Class: Chronic    Hyponatremia [E87.1] 01/19/2014     Priority: Medium     Class: Temporary    Acute on chronic respiratory failure with hypoxemia (HCC) [J96.21] 12/11/2020    Acute on chronic respiratory failure with hypoxia ([facility] [J96.21] 12/09/2020    COVID-19 virus detected [U07.1] 11/28/2020    Pneumonia [J18.9] 11/17/2020    Tachycardia [R00.0] 11/11/2018    Essential hypertension [I10] 12/11/2017    Mixed hyperlipidemia [E78.2] 12/11/2017    Altered mental status [R41.82] 02/19/2016    Generalized weakness [R53.1] 02/19/2016 SARS-COV-2- positive with pneumonia  bipap  COVID-19 Convalescent Plasma ordered  Leukocytosis follow  cdiff  Sp rx VSE bacteremia  cont atbx  · Convalescent plasma   · DECADRON 6MG  qday    · Vitamins   · Encouraged proning, side to side positions  ·  check trough     Labs in am     vancomycin (VANCOCIN) oral solution 125 mg, 4x daily     piperacillin-tazobactam (ZOSYN) 3.375 g in dextrose 5 % 50 mL IVPB extended infusion (mini-bag), Q8H     Spoke with DR Friend        PLAN: CONTINUE CURRENT MEDICATIONS AND SUPPORTIVE CARE. Thank you for involving me in the care of Milas Saint. Please do not hesitate to call for any questions or concerns.     Electronically signed by Nila Mena MD on 12/12/2020 at 2:48 PM    Phone [phone]  Fax [phone]

## 2020-12-12 NOTE — PROGRESS NOTES
PULMONARY MEDICINE CONSULT      79year old woman with PMH as described below admitted to hospital for management of     Acute hypoxemic respiratory failure secondary to severe COVID-19 pneumonia and ARDS  --Oxygen supplementation to maintain sats > 89%, can use Vapotherm or BPAP  --Prone position recommended  --Antimicrobial regimen: Piperacillin/tazobactan and vancomycin  --Antiviral regimen:Dexamethasone 6 mg PO/IV X 10 days  --Cultures reviewed  --Inflammatory markers daily  --Anticoagulation: SCDs  --The patient is comfort care, with evidence of respiratory distress with tachypnea and tachycardia, will order opioid for easing of dyspnea, haloperidol for management of terminal restlessness    Diabetes mellitus  --Management by primary team     Hypertension  --On antihypertensives    Hyperlipidemia  --Continue statin     The patient is DNR-CC, I will sign off. Rest of supportive care as per primary team      BP (!) 160/90   Pulse 127   Temp 98.7 °F (37.1 °C) (Axillary)   Resp 16   Ht 5' 2\" (1.575 m)   Wt 123 lb (55.8 kg)   SpO2 95%   BMI 22.50 kg/m²   General: Somnolent, tachypneic, tachycardic  HEENT: No head lesions, PERRL, EOMI, mouth without lesions, no nasal lesions, no cervical adenopathy palpated  Respiratory: Lungs with diminished breath sounds bilaterally, with accessory muscle use  CV: Regular rate, no murmurs, noJVD, trace leg edema  Abdomen: Soft, non tender, + bowel sounds, no lesions  Skin: Hydrated, adequate turgor, no rash, capillary refill <2 seconds  Extremities: Muscular strength 2/5 in 4 limbs, moves 4 limbs spontaneously, distal pulses present  Neurology: Somnolent, neck is supple, no meningitic signs present.     Past Medical History:   Diagnosis Date    Arthritis     joints, knees, back  djd    Cerebral artery occlusion with cerebral infarction ([address]     Chronic back pain     COPD (chronic obstructive pulmonary disease) (HCC)     Diabetes mellitus ([address]  GERD (gastroesophageal reflux disease)     H/O cardiovascular stress test 2018    lexiscan stress test    Hyperlipidemia     Thyroid disease      History reviewed. No pertinent family history.   Social History     Socioeconomic History    Marital status:      Spouse name: Not on file    Number of children: Not on file    Years of education: Not on file    Highest education level: Not on file   Occupational History    Not on file   Social Needs    Financial resource strain: Not on file    Food insecurity     Worry: Not on file     Inability: Not on file    Transportation needs     Medical: Not on file     Non-medical: Not on file   Tobacco Use    Smoking status: Former Smoker     Packs/day: 1.00     Years: 60.00     Pack years: 60.00     Quit date: 10/18/2020     Years since quittin.1    Smokeless tobacco: Never Used   Substance and Sexual Activity    Alcohol use: No    Drug use: No    Sexual activity: Not Currently   Lifestyle    Physical activity     Days per week: Not on file     Minutes per session: Not on file    Stress: Not on file   Relationships    Social connections     Talks on phone: Not on file     Gets together: Not on file     Attends Jain service: Not on file     Active member of club or organization: Not on file     Attends meetings of clubs or organizations: Not on file     Relationship status: Not on file    Intimate partner violence     Fear of current or ex partner: Not on file     Emotionally abused: Not on file     Physically abused: Not on file     Forced sexual activity: Not on file   Other Topics Concern    Not on file   Social History Narrative    Not on file     Current Facility-Administered Medications   Medication Dose Route Frequency Provider Last Rate Last Admin    fentaNYL (SUBLIMAZE) injection 12.5 mcg  12.5 mcg Intravenous Q6H PRN Cassandra Friend MD   12.5 mcg at 20 8143  dexamethasone (DECADRON) injection 4 mg  4 mg Intravenous Q12H Rosio MD Phuc   4 mg at 12/12/20 0215    glucose (GLUTOSE) 40 % oral gel 15 g  15 g Oral PRN Percy Castellano MD        dextrose 50 % IV solution  12.5 g Intravenous PRN Percy Castellano MD        glucagon (rDNA) injection 1 mg  1 mg Intramuscular PRN Percy Castellano MD        dextrose 5 % solution  100 mL/hr Intravenous PRN Percy Castellano MD        acetaminophen (TYLENOL) tablet 500 mg  500 mg Oral Q6H PRN Cassandra Friend MD        budesonide-formoterol (SYMBICORT) 80-4.5 MCG/ACT inhaler 2 puff  2 puff Inhalation BID Cassandra Friend MD        montelukast (SINGULAIR) tablet 10 mg  10 mg Oral Nightly Cassandra Friend MD   10 mg at 12/10/20 0150    glimepiride (AMARYL) tablet 2 mg  2 mg Oral QAM AC Cassandra Friend MD   Stopped at 12/10/20 3224    lactobacillus (CULTURELLE) capsule 1 capsule  1 capsule Oral Q12H Cassandra Friend MD        Vitamin D (CHOLECALCIFEROL) tablet 2,000 Units  2,000 Units Oral Daily Cassandra Friend MD        rosuvastatin (CRESTOR) tablet 20 mg  20 mg Oral Nightly Cassandra Friend MD   20 mg at 12/10/20 0149    losartan (COZAAR) tablet 50 mg  50 mg Oral Nightly Cassandra Friend MD   50 mg at 12/10/20 0149    amLODIPine (NORVASC) tablet 5 mg  5 mg Oral Daily Cassandra Friend MD        calcium-vitamin D (OSCAL-500) 500-200 MG-UNIT per tablet 2 tablet  2 tablet Oral BID Cassandra Freind MD   2 tablet at 12/10/20 0700    vancomycin (VANCOCIN) oral solution 125 mg  125 mg Oral 4x daily Cassandra Friend MD   125 mg at 12/10/20 0700    levothyroxine (SYNTHROID) tablet 137 mcg  137 mcg Oral Daily Cassandra Friend MD        vitamin B and C (TOTAL B-C) 1 tablet  1 tablet Oral Daily Cassandra Friend MD        pantoprazole (PROTONIX) tablet 40 mg  40 mg Oral QAM AC Cassandra Friend MD        albuterol-ipratropium (COMBIVENT RESPIMAT)  MCG/ACT inhaler 1 puff  1 puff Inhalation Q6H PRN Cassandra Carbajal MD  tiotropium (SPIRIVA) inhalation capsule 18 mcg  18 mcg Inhalation Daily Cassandra Friend MD        0.9 % sodium chloride bolus  20 mL Intravenous Once Lesly Gimenez MD        piperacillin-tazobactam (ZOSYN) 3.375 g in dextrose 5 % 50 mL IVPB extended infusion (mini-bag)  3.375 g Intravenous Q8H Lesly Gimenez MD 12.5 mL/hr at 12/12/20 1325 3.375 g at 12/12/20 1325    0.9 % sodium chloride infusion  25 mL Intravenous Q8H Lesly Gimenez MD   Stopped at 12/12/20 1239    metoprolol succinate (TOPROL XL) extended release tablet 50 mg  50 mg Oral Daily Aniket Barton MD        zinc sulfate (ZINCATE) capsule 50 mg  50 mg Oral Daily Aniket Barton MD        vitamin C (ASCORBIC ACID) tablet 1,000 mg  1,000 mg Oral Daily Aniket Barton MD         MEDICATIONS:   dexamethasone  4 mg Intravenous Q12H    budesonide-formoterol  2 puff Inhalation BID    montelukast  10 mg Oral Nightly    glimepiride  2 mg Oral QAM AC    lactobacillus  1 capsule Oral Q12H    Vitamin D  2,000 Units Oral Daily    rosuvastatin  20 mg Oral Nightly    losartan  50 mg Oral Nightly    amLODIPine  5 mg Oral Daily    calcium-vitamin D  2 tablet Oral BID    vancomycin  125 mg Oral 4x daily    levothyroxine  137 mcg Oral Daily    vitamin B and C  1 tablet Oral Daily    pantoprazole  40 mg Oral QAM AC    tiotropium  18 mcg Inhalation Daily    sodium chloride  20 mL Intravenous Once    piperacillin-tazobactam  3.375 g Intravenous Q8H    metoprolol succinate  50 mg Oral Daily    zinc sulfate  50 mg Oral Daily    vitamin C  1,000 mg Oral Daily      dextrose      sodium chloride Stopped (12/12/20 1239)     fentanNYL, glucose, dextrose, glucagon (rDNA), dextrose, acetaminophen, albuterol-ipratropium    OBJECTIVE:  Vitals:    12/12/20 0806   BP: (!) 145/81   Pulse: 118   Resp: 18   Temp: 97.9 °F (36.6 °C)   SpO2: 93%     FiO2 : 100 %  O2 Flow Rate (L/min): 15 L/min  O2 Device: PAP (positive airway pressure)        LABS:  WBC Date Value Ref Range Status   12/11/2020 20.9 (H) 4.5 - 11.5 E9/L Final   12/10/2020 18.7 (H) 4.5 - 11.5 E9/L Final   12/09/2020 16.7 (H) 4.5 - 11.5 E9/L Final     Hemoglobin   Date Value Ref Range Status   12/11/2020 11.7 11.5 - 15.5 g/dL Final   12/10/2020 12.5 11.5 - 15.5 g/dL Final   12/09/2020 14.6 11.5 - 15.5 g/dL Final     Hematocrit   Date Value Ref Range Status   12/11/2020 36.7 34.0 - 48.0 % Final   12/10/2020 38.8 34.0 - 48.0 % Final   12/09/2020 45.0 34.0 - 48.0 % Final     MCV   Date Value Ref Range Status   12/11/2020 85.2 80.0 - 99.9 fL Final   12/10/2020 84.7 80.0 - 99.9 fL Final   12/09/2020 84.1 80.0 - 99.9 fL Final     Platelets   Date Value Ref Range Status   12/11/2020 255 130 - 450 E9/L Final   12/10/2020 226 130 - 450 E9/L Final   12/09/2020 299 130 - 450 E9/L Final     Sodium   Date Value Ref Range Status   12/11/2020 134 132 - 146 mmol/L Final   12/10/2020 129 (L) 132 - 146 mmol/L Final   12/09/2020 128 (L) 132 - 146 mmol/L Final     Potassium   Date Value Ref Range Status   12/09/2020 4.2 3.5 - 5.0 mmol/L Final   11/30/2020 4.4 3.5 - 5.0 mmol/L Final   11/29/2020 3.8 3.5 - 5.0 mmol/L Final     Potassium reflex Magnesium   Date Value Ref Range Status   12/11/2020 3.5 3.5 - 5.0 mmol/L Final   12/10/2020 4.5 3.5 - 5.0 mmol/L Final     Comment:     Specimen is moderately Hemolyzed. Result may be artificially increased.    10/06/2020 4.4 3.5 - 5.0 mmol/L Final     Chloride   Date Value Ref Range Status   12/11/2020 100 98 - 107 mmol/L Final   12/10/2020 94 (L) 98 - 107 mmol/L Final   12/09/2020 89 (L) 98 - 107 mmol/L Final     CO2   Date Value Ref Range Status   12/11/2020 20 (L) 22 - 29 mmol/L Final   12/10/2020 26 22 - 29 mmol/L Final   12/09/2020 28 22 - 29 mmol/L Final     BUN   Date Value Ref Range Status   12/11/2020 22 8 - 23 mg/dL Final   12/10/2020 17 8 - 23 mg/dL Final   12/09/2020 21 8 - 23 mg/dL Final     CREATININE   Date Value Ref Range Status 12/11/2020 1.1 (H) 0.5 - 1.0 mg/dL Final   12/10/2020 0.8 0.5 - 1.0 mg/dL Final   12/09/2020 1.0 0.5 - 1.0 mg/dL Final     Glucose   Date Value Ref Range Status   12/11/2020 137 (H) 74 - 99 mg/dL Final   12/10/2020 127 (H) 74 - 99 mg/dL Final   12/09/2020 126 (H) 74 - 99 mg/dL Final   10/03/2011 158 (H) 70 - 110 mg/dL Final   07/08/2011 173 (H) 70 - 110 mg/dL Final   04/07/2011 113 (H) 70 - 110 mg/dL Final     Calcium   Date Value Ref Range Status   12/11/2020 8.7 8.6 - 10.2 mg/dL Final   12/10/2020 8.3 (L) 8.6 - 10.2 mg/dL Final   12/09/2020 9.2 8.6 - 10.2 mg/dL Final     Total Protein   Date Value Ref Range Status   12/11/2020 5.6 (L) 6.4 - 8.3 g/dL Final   12/10/2020 6.1 (L) 6.4 - 8.3 g/dL Final   12/09/2020 6.8 6.4 - 8.3 g/dL Final     Albumin   Date Value Ref Range Status   10/03/2011 4.0 3.2 - 4.8 g/dL Final   07/08/2011 4.1 3.2 - 4.8 g/dL Final   04/07/2011 4.1 3.2 - 4.8 g/dL Final     Alb   Date Value Ref Range Status   12/11/2020 1.9 (L) 3.5 - 5.2 g/dL Final   12/10/2020 2.1 (L) 3.5 - 5.2 g/dL Final   12/09/2020 2.8 (L) 3.5 - 5.2 g/dL Final     Total Bilirubin   Date Value Ref Range Status   12/11/2020 0.3 0.0 - 1.2 mg/dL Final   12/10/2020 0.2 0.0 - 1.2 mg/dL Final   12/09/2020 0.3 0.0 - 1.2 mg/dL Final     Alkaline Phosphatase   Date Value Ref Range Status   12/11/2020 91 35 - 104 U/L Final   12/10/2020 98 35 - 104 U/L Final   12/09/2020 120 (H) 35 - 104 U/L Final     AST   Date Value Ref Range Status   12/11/2020 39 (H) 0 - 31 U/L Final   12/10/2020 37 (H) 0 - 31 U/L Final     Comment:     Specimen is moderately Hemolyzed. Result may be artificially increased. 12/09/2020 25 0 - 31 U/L Final     Comment:     Specimen is slightly Hemolyzed. Result may be artificially increased.      ALT   Date Value Ref Range Status   12/11/2020 18 0 - 32 U/L Final   12/10/2020 17 0 - 32 U/L Final   12/09/2020 19 0 - 32 U/L Final     GFR Non-   Date Value Ref Range Status 12/11/2020 48 >=60 mL/min/1.73 Final     Comment:     Chronic Kidney Disease: less than 60 ml/min/1.73 sq.m. Kidney Failure: less than 15 ml/min/1.73 sq.m. Results valid for patients 18 years and older. 12/10/2020 >60 >=60 mL/min/1.73 Final     Comment:     Chronic Kidney Disease: less than 60 ml/min/1.73 sq.m. Kidney Failure: less than 15 ml/min/1.73 sq.m. Results valid for patients 18 years and older. 12/09/2020 53 >=60 mL/min/1.73 Final     Comment:     Chronic Kidney Disease: less than 60 ml/min/1.73 sq.m. Kidney Failure: less than 15 ml/min/1.73 sq.m. Results valid for patients 18 years and older. GFR    Date Value Ref Range Status   12/11/2020 58  Final   12/10/2020 >60  Final   12/09/2020 >60  Final     Magnesium   Date Value Ref Range Status   12/11/2020 1.4 (L) 1.6 - 2.6 mg/dL Final   12/30/2019 0.4 (LL) 1.6 - 2.6 mg/dL Final   11/14/2018 1.2 (L) 1.6 - 2.6 mg/dL Final     Phosphorus   Date Value Ref Range Status   12/30/2019 2.9 2.5 - 4.5 mg/dL Final     Recent Labs     12/09/20  2034 12/09/20  2034 12/10/20  0836   PH 7.488*   < > 7.458*   PO2 42.9*  --   --    PCO2 32.3*  --   --    HCO3 24.0  --   --    BE 1.3   < > -0.4   O2SAT 82.5*   < > 68.0*    < > = values in this interval not displayed. RADIOLOGY:  CT HEAD WO CONTRAST   Final Result   1. No acute intracranial abnormality. Moderate to severe senescent changes. Intracranial atherosclerotic disease. 2.  Mild prominence of ventricular system which is out of proportion to the   degree of gyral atrophy. Consider normal pressure hydrocephalus in the right   clinical setting. XR CHEST PORTABLE   Final Result   COPD with scarring with atelectasis/hazy infiltrative densities lung bases   concerning for pneumonia.         PROBLEM LIST:  Active Problems:    Diabetes mellitus type II, controlled ([address]    Chronic obstructive pulmonary disease (HCC)    Hyponatremia

## 2020-12-13 NOTE — PROGRESS NOTES
Patient still unable to eat. Does awaken easily. Updated patient daughter on condition. Currently on non-rebreather mask at 15L with O2 sats in 90s.

## 2020-12-14 NOTE — DISCHARGE INSTR - COC
Continuity of Care Form    Patient Name: Parul Holman   :  1940  MRN:  [id number]    Admit date:  2020  Discharge date:  ***    Code Status Order: DNR-CC   Advance Directives:     Admitting Physician:  Yojana Thomson MD  PCP: Yojana Thomson MD    Discharging Nurse: [facility] Unit/Room#: 2512/0498-79  Discharging Unit Phone Number: ***    Emergency Contact:   Extended Emergency Contact Information  Primary Emergency Contact: [facility]  Address: [address] [facility] [address] Phone: [phone]  Relation: Spouse  Secondary Emergency Contact: Felicia Aguilar [facility] [address] Phone: [phone]  Relation: Child    Past Surgical History:  Past Surgical History:   Procedure Laterality Date    APPENDECTOMY      BREAST SURGERY      cyst     CHOLECYSTECTOMY      COLONOSCOPY      ENDOSCOPY, COLON, DIAGNOSTIC      HYSTERECTOMY      JOINT REPLACEMENT      left knee    KNEE ARTHROPLASTY Right 1/15/2014    TOTAL KNEE ARTHROPLASTY    THYROIDECTOMY      TRANSESOPHAGEAL ECHOCARDIOGRAM N/A 2020    TRANSESOPHAGEAL ECHOCARDIOGRAM performed by Jessica Fraser MD at [facility] ENDOSCOPY       Immunization History:   Immunization History   Administered Date(s) Administered    Influenza Virus Vaccine 2014    Pneumococcal Polysaccharide ([id number]) 2014    Tdap (Boostrix, Adacel) 2019       Active Problems:  Patient Active Problem List   Diagnosis Code    Breast mass, right N63.10    DJD (degenerative joint disease) of knee M17.10    Knee pain M25.569    Iron (Fe) deficiency anemia D50.9    Diabetes mellitus type II, controlled ([address] E11.9    Chronic obstructive pulmonary disease ([address] J44.9    Atelectasis J98.11    Hyponatremia E87.1    S/P total knee arthroplasty Z96.659    Status post total bilateral knee replacement Z96.653    Localized osteoarthrosis, lower leg M17.10  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 difficile colitis A04.72    Acute urinary tract infection N39.0    Fluid overload E87.70    Lethargic R53.83    COVID-19 virus detected U07.1    Acute on chronic respiratory failure with hypoxia ([facility]) J96.21    Acute on chronic respiratory failure with hypoxemia ([facility]) J96.21       Isolation/Infection:   Isolation          Airborne, Droplet and Contact         Patient Infection Status     Infection Onset Added Last Indicated Last Indicated By Review Planned Expiration Resolved Resolved By    COVID-19 12/10/20 12/10/20 12/10/20 Respiratory Panel, Molecular, with COVID-19 (Restricted: peds pts or suitable admitted adults) 20      VRE 20 Culture, Blood 1        Enterococcus faecium blood 2020    Resolved    COVID-19 Rule Out 12/10/20 12/10/20 12/10/20 Respiratory Panel, Molecular, with COVID-19 (Restricted: peds pts or suitable admitted adults) (Ordered)   12/10/20 Rule-Out Test Resulted    COVID-19 20 Respiratory Panel, Molecular, with COVID-19 (Restricted: peds pts or suitable admitted adults)   20     COVID-19 Rule Out 20 Respiratory Panel, Molecular, with COVID-19 (Restricted: peds pts or suitable admitted adults) (Ordered)   20 Rule-Out Test Resulted COVID-19 Rule Out 11/18/20 11/18/20 11/18/20 Respiratory Panel, Molecular, with COVID-19 (Restricted: peds pts or suitable admitted adults) (Ordered)   11/18/20 Rule-Out Test Resulted    COVID-19 Rule Out 11/17/20 11/17/20 11/17/20 COVID-19 (Ordered)   11/17/20 Rule-Out Test Resulted    COVID-19 Rule Out 05/25/20 05/25/20 05/25/20 COVID-19 (Ordered)   05/25/20 Rule-Out Test Resulted    C-diff Rule Out  12/29/19 11/19/20 Clostridium difficile EIA (Ordered)   11/19/20 Rule-Out Test Resulted          Nurse Assessment:  Last Vital Signs: /64   Pulse 120   Temp 97.1 °F (36.2 °C) (Infrared)   Resp 22   Ht 5' 2\" (1.575 m)   Wt 123 lb (55.8 kg)   SpO2 98%   BMI 22.50 kg/m²     Last documented pain score (0-10 scale): Pain Level: 7  Last Weight:   Wt Readings from Last 1 Encounters:   12/09/20 123 lb (55.8 kg)     Mental Status:  {IP PT MENTAL STATUS:20030}    IV Access:  { DAIJA IV ACCESS:632508379}    Nursing Mobility/ADLs:  Walking   {CHP DME YMCB:933643568}  Transfer  {CHP DME JJVA:169422525}  Bathing  {CHP DME RKQI:033905428}  Dressing  {CHP DME JAWY:851238065}  Toileting  {CHP DME DZQC:646136552}  Feeding  {CHP DME GFVJ:925567462}  Med Admin  {CHP DME YLUE:419248061}  Med Delivery   { DAIJA MED Delivery:966193812}    Wound Care Documentation and Therapy:  Wound 11/18/20 Back center (Active)   Dressing Status Clean;Dry; Intact 12/13/20 1400   Dressing/Treatment Foam 12/13/20 1400   Wound Assessment Pink/red 12/13/20 1400   Drainage Amount None 12/13/20 1400   Odor None 12/13/20 1400   Dayana-wound Assessment Blanchable erythema; Intact 12/13/20 1400   Number of days: 26        Elimination:  Continence:   · Bowel: {YES / DT:19397}  · Bladder: {YES / LX:46159}  Urinary Catheter: {Urinary Catheter:823340748}   Colostomy/Ileostomy/Ileal Conduit: {YES / RT:15505}       Date of Last BM: ***    Intake/Output Summary (Last 24 hours) at 12/14/2020 1220  Last data filed at 12/14/2020 0550  Gross per 24 hour Intake 395 ml   Output 1300 ml   Net -905 ml     I/O last 3 completed shifts: In: 12 [I.V.:395]  Out: 1300 [Urine:1300]    Safety Concerns:     [address] cliniq.ly Safety Concerns:734193779}    Impairments/Disabilities:      [address] DAIJA Impairments/Disabilities:446952353}    Nutrition Therapy:  Current Nutrition Therapy:   [address] DAIJA Diet List:669105366}    Routes of Feeding: {CHP DME Other Feedings:998279492}  Liquids: {Slp liquid thickness:87989}  Daily Fluid Restriction: {CHP DME Yes amt example:935458853}  Last Modified Barium Swallow with Video (Video Swallowing Test): {Done Not Done EGCZ:206081271}    Treatments at the Time of Hospital Discharge:   Respiratory Treatments: ***  Oxygen Therapy:  {Therapy; copd oxygen:42745}  Ventilator:    { CC Vent JWCU:258212941}    Rehab Therapies: {THERAPEUTIC INTERVENTION:3496756699}  Weight Bearing Status/Restrictions: { CC Weight Bearin}  Other Medical Equipment (for information only, NOT a DME order):  {EQUIPMENT:103788181}  Other Treatments: ***    Patient's personal belongings (please select all that are sent with patient):  {[facility] DME Belongings:728659970}    RN SIGNATURE:  {Esignature:483334675}    CASE MANAGEMENT/SOCIAL WORK SECTION    Inpatient Status Date: ***    Readmission Risk Assessment Score:  Readmission Risk              Risk of Unplanned Readmission:        29           Discharging to Facility/ Agency   · Name: [facility]  · Address: [address] [place] [address]  · Phone: [phone]  · Fax: [phone]    Dialysis Facility (if applicable)   · Name:  · Address:  · Dialysis Schedule:  · Phone:  · Fax:    / signature: Electronically signed by Samantha Goins.  Burtis Gosselin on 20 at 12:22 PM EST    PHYSICIAN SECTION    Prognosis: Good    Condition at Discharge: Stable    Rehab Potential (if transferring to Rehab): Good    Recommended Labs or Other Treatments After Discharge: *** Physician Certification: I certify the above information and transfer of Estefany Ley  is necessary for the continuing treatment of the diagnosis listed and that she requires [facility] for greater 30 days.      Update Admission H&P: {CHP DME Changes in AKFRS:138854254}    PHYSICIAN SIGNATURE:  {Esignature:014803104}

## 2020-12-14 NOTE — CARE COORDINATION
SS NOTE: COVID POSITIVE 12/10. [facility] is able to accept pt on the continuous Bipap, possibly today if pt is stable to transport. Neither [facility] or [facility] as long as the family is willing to complete a medicaid application once pt is there they will attempt to submit for skilled time with pt's insurance. SW spoke with pt's dtr Sharon and she accepts this facilty and wants to proceed with admission there. For the SNF placement pt will need a signed DAIJA, current PT/OT notes and SW completed the PASRR. Sujit Rendon. 12/14/2020.9:29 AM.

## 2020-12-14 NOTE — CARE COORDINATION
SS NOTE: COVID POSITIVE 12/10. CHAVA recd a call from [address] [place] [address] Liaison Rosemarie, [phone]. She spoke with pt's dtr, Sharon and they plan is for pt to return home with Hospice tomorrow. Rosemarie will come to this floor in the AM to complete the details of this dch plan. Sherri Rendon. 12/14/2020.3:54PM.

## 2020-12-14 NOTE — PROGRESS NOTES
[facility] Progress Note    Date:  12/13/20  Hospital Day:  Hospital Day: 5  PT Name:  Jennie Randle  MRN:   [id number]  Primary Care Physician: Asiya Shultz MD  Requesting physician:   Asiya Shultz MD    Reason for Consultation:  Reason for follow up: antibiotics covid /pneumonia  Chief Complaint:   Chief Complaint   Patient presents with    Shortness of Breath     Recent positive Covid-19 test    Failure To Thrive     Not eating or drinking much per EMS     Subjective/HPI/:  Tashi Esteban was seen and examined at bedside today for covid/pneumonia  Overnight: none  Calm   nad  Awake but does not answer  There are no adverse drug reactions noted    All tests were reviewed. No  family present during my examination. Has sitter    ROS: unable due to pt's status        Assessment  Jennie Randle is a 79y.o. year old female who presented on 12/9/2020 and is being treated for <principal problem not specified>   Chief Complaint   Patient presents with    Shortness of Breath     Recent positive Covid-19 test    Failure To Thrive     Not eating or drinking much per EMS   SARS-COV-2- positive with pneumonia  rx hcap aspiration piptazo   S/p remdesivir  COVID-19 Convalescent Plasma ordered  Leukocytosis follow   Cdiff vanco po   Sp rx VSE bacteremia off atbx     Plan     on 15L   · Watch for cdiff colitis/clabsi-line infection  · Monitor labs   Covid-19, Antibody, Total    DNR comfort care    Inpatient consult to Pulmonology    Prepare COVID-19 Convalescent Plasma, 2 Units    Prepare COVID-19 Convalescent Plasma     · Continue current therapy. · Please see orders for further management and care.     Hospital Medications      0.9 % sodium chloride infusion, Continuous      haloperidol lactate (HALDOL) injection 2 mg, Q6H PRN      morphine (PF) injection 2 mg, Q1H PRN    Or      morphine injection 4 mg, Q1H PRN      dexamethasone (DECADRON) injection 4 mg, Q12H      glucose (GLUTOSE) 40 % oral gel 15 g, PRN   dextrose 50 % IV solution, PRN      glucagon (rDNA) injection 1 mg, PRN      dextrose 5 % solution, PRN      acetaminophen (TYLENOL) tablet 500 mg, Q6H PRN      budesonide-formoterol (SYMBICORT) 80-4.5 MCG/ACT inhaler 2 puff, BID      montelukast (SINGULAIR) tablet 10 mg, Nightly      glimepiride (AMARYL) tablet 2 mg, QAM AC      lactobacillus (CULTURELLE) capsule 1 capsule, Q12H      Vitamin D (CHOLECALCIFEROL) tablet 2,000 Units, Daily      rosuvastatin (CRESTOR) tablet 20 mg, Nightly      losartan (COZAAR) tablet 50 mg, Nightly      amLODIPine (NORVASC) tablet 5 mg, Daily      calcium-vitamin D (OSCAL-500) 500-200 MG-UNIT per tablet 2 tablet, BID      vancomycin (VANCOCIN) oral solution 125 mg, 4x daily      levothyroxine (SYNTHROID) tablet 137 mcg, Daily      vitamin B and C (TOTAL B-C) 1 tablet, Daily      pantoprazole (PROTONIX) tablet 40 mg, QAM AC      albuterol-ipratropium (COMBIVENT RESPIMAT)  MCG/ACT inhaler 1 puff, Q6H PRN      tiotropium (SPIRIVA) inhalation capsule 18 mcg, Daily      0.9 % sodium chloride bolus, Once      piperacillin-tazobactam (ZOSYN) 3.375 g in dextrose 5 % 50 mL IVPB extended infusion (mini-bag), Q8H      0.9 % sodium chloride infusion, Q8H      metoprolol succinate (TOPROL XL) extended release tablet 50 mg, Daily      zinc sulfate (ZINCATE) capsule 50 mg, Daily      vitamin C (ASCORBIC ACID) tablet 1,000 mg, Daily        PRN Medications  haloperidol lactate, morphine **OR** morphine, glucose, dextrose, glucagon (rDNA), dextrose, acetaminophen, albuterol-ipratropium  Allergies   Allergen Reactions    Latex Itching and Rash    Codeine Anxiety    Morphine Anxiety     Objective  Most Recent Recorded Vitals  Vitals:    12/13/20 1519   BP: (!) 161/82   Pulse: 123   Resp: 18   Temp: 97.5 °F (36.4 °C)   SpO2: 99%     I/O last 3 completed shifts: In: 888 [I.V.:775]  Out: 1400 [Urine:1400]  No intake/output data recorded.   Physical Exam: General:  Awake NAD calm  Heent:   AT/NC Conjunctivae/corneas clear, Sclera non icteric. Skin:   Color, texture, and turgor normal. No rashes or lesions. Lungs:   Dec  to auscultation bilaterally. No retractions or use of accessory muscles. Heart:   Regular rate and regular rhythm, no murmur  Abdomen:   Soft, non-tender; bowel sounds normal   Extremities:  Atraumatic, no cyanosis, no edema  Neurologic:  calm  Psych:   Pleasant  calm  Lines   PIv  Stark yellow    DATA:  Microbiology   BLOOD CX #1  No results for input(s): BC in the last 72 hours. BLOOD CX #2  No results for input(s): Silverio Thomas in the last 72 hours.    SARS-COV-2 biomarkers  Recent Labs     12/11/20  1440 12/12/20  1858   CRP  --  17.6*   FERRITIN  --  1,576   LDH  --  281*   TROPONINI  --  <0.01   DDIMER  --  717   FIBRINOGEN  --  >700*   INR 1.4 1.2   PROTIME 15.5* 14.1*   AST 39* 32*   ALT 18 20     Lab Results   Component Value Date    CHOL 180 11/25/2020    TRIG 131 11/25/2020    HDL 67 11/25/2020    LDLCALC 87 11/25/2020    LABVLDL 26 11/25/2020     Lab Results   Component Value Date/Time    VITD25 17 (L) 11/25/2020 08:26 AM     Recent Labs     12/11/20  1440 12/12/20  1858   WBC 20.9* 14.1*   HGB 11.7 12.2   HCT 36.7 37.5    322   MCV 85.2 83.5   MCH 27.1 27.2   MCHC 31.9* 32.5   RDW 15.7* 15.7*   LYMPHOPCT 8.4* 6.1*   MONOPCT 4.9 3.3   BASOPCT 0.1 0.1   MONOSABS 1.03* 0.47   LYMPHSABS 1.76 0.86*   EOSABS 0.00* 0.00*   BASOSABS 0.02 0.01     Recent Labs     12/11/20  1440 12/12/20  1858    135   K 3.5 3.3*    97*   CO2 20* 21*   BUN 22 25*   CREATININE 1.1* 1.1*   GFRAA 58 58   LABGLOM 48 48   GLUCOSE 137* 221*   PROT 5.6* 6.3*   LABALBU 1.9* 2.3*   CALCIUM 8.7 9.3   BILITOT 0.3 0.3   ALKPHOS 91 100   AST 39* 32*   ALT 18 20     U/A:    Lab Results   Component Value Date    COLORU Yellow 12/09/2020    PROTEINU >=300 12/09/2020    PHUR 6.5 12/09/2020    LABCAST RARE 11/11/2018    WBCUA 0-1 12/09/2020 WBCUA 2-5 04/07/2011    RBCUA 1-3 12/09/2020    RBCUA 1-3 01/22/2014    BACTERIA RARE 12/09/2020    CLARITYU Clear 12/09/2020    SPECGRAV 1.025 12/09/2020    LEUKOCYTESUR Negative 12/09/2020    UROBILINOGEN 0.2 12/09/2020    BILIRUBINUR Negative 12/09/2020    BILIRUBINUR NEGATIVE 04/07/2011    BLOODU TRACE 12/09/2020    GLUCOSEU Negative 12/09/2020    GLUCOSEU NEGATIVE 04/07/2011       Lab Results   Component Value Date    CRP 17.6 (H) 12/12/2020    CRP 2.8 (H) 11/25/2020    CRP 4.1 (H) 11/24/2020     Lab Results   Component Value Date    SEDRATE 111 (H) 12/12/2020    SEDRATE 86 (H) 12/29/2019    SEDRATE 84 (H) 12/29/2019       Lab Results   Component Value Date    BC 24 Hours no growth 12/09/2020    BC 5 Days no growth 11/20/2020    BC Beta Lactamase negative 11/17/2020    BC 5 Days- no growth 05/25/2020    BC 5 Days- no growth 12/29/2019    BC 5 Days- no growth 12/27/2019    BC 5 Days- no growth 11/11/2018    BC 5 Days- no growth 02/17/2016     Urine Culture, Routine   Date Value Ref Range Status   12/09/2020 Growth not present  Final   11/17/2020 >100,000 CFU/ml  Final   05/25/2020   Final    <10,000 CFU/mL  Mixed gram positive organisms  Gram negative rods       Organism   Date Value Ref Range Status   11/17/2020 Enterococcus faecium (A)  Final   11/17/2020 Enterococcus faecium (A)  Final   11/17/2020 Staphylococcus coagulase-negative (A)  Final       Echo Transesophageal    Result Date: 11/23/2020 Transesophageal Echocardiography Report (CARLOS)   Demographics   Patient Name        Christiano Thornton Gender               Female   Medical Record      [id number]     Room Number          ENDOPL  Number   Account #           [de-identified]    Procedure Date       11/23/2020   Corporate ID                     Ordering Physician   Minoo Oconnor MD   Accession Number    [id number]   Referring Physician   Date of Birth       1940   Sonographer          Mehdi [de-identified] [facility]   Age                 79 year(s)   Interpreting         Minoo Oconnor MD                                   Physician                                    Any Other  Procedure Type of Study   CARLOS procedure:Transesophageal Echo (CARLOS), Color Flow Velocity Mapping. Procedure Date Date: 11/23/2020 Start: 02:22 PM Study Location: Portable Technical Quality: Adequate visualization Indications:R/O Endocarditis. Patient Status: Routine Height: 65 inches Weight: 132 pounds BSA: 1.66 m^2 BMI: 21.97 kg/m^2 BP: 143/73 mmHg CARLOS Performed By: the attending and the sonographer  Type of Anesthesia: General anesthesia   Findings   Left Ventricle  Normal left ventricular systolic function. Right Ventricle  Normal right ventricle structure and function. Mitral Valve  Normal mitral valve structure and function. No mitral valve regurgitation. Tricuspid Valve  Normal tricuspid valve structure and function. Trace tricuspid valve regurgitation. Aortic Valve  Aortic valve calcification. No aortic regurgitation. Pulmonic Valve  Normal pulmonic valve structure and function. No pulmonic valve regurgitation. Aorta  Moderate atherosclerosis of the descending aorta and aortic arch. Conclusions   Summary  No intracardiac vegetation. Normal left ventricular systolic function. Normal right ventricle structure and function. Aortic valve calcification. No aortic regurgitation. Moderate atherosclerosis of the descending aorta and aortic arch.    Signature ----------------------------------------------------------------  Electronically signed by Soren Holley MD(Interpreting  physician) on 11/23/2020 03:51 PM  ----------------------------------------------------------------    Xr Chest (2 Vw)    Result Date: 11/23/2020  EXAMINATION: TWO XRAY VIEWS OF THE CHEST 11/23/2020 9:05 am COMPARISON: Comparison is dated November 17, 2020 HISTORY: ORDERING SYSTEM PROVIDED HISTORY: Follow-up pneumonia TECHNOLOGIST PROVIDED HISTORY: Reason for exam:->Follow-up pneumonia FINDINGS: Streaky opacity in the right upper lobe apex is unchanged. There is some increased patchy and linear opacity at the right base on the left parahilar region new since the prior study. There is diffuse bilateral interstitial prominence. Cardiac and mediastinal contours do not appear changed    Increasing bilateral pulmonary infiltrates/atelectasis    Xr Chest (2 Vw)    Result Date: 11/17/2020  EXAMINATION: TWO XRAY VIEWS OF THE CHEST 11/17/2020 6:25 pm COMPARISON: 10/06/2020 HISTORY: ORDERING SYSTEM PROVIDED HISTORY: Weakness/N/V TECHNOLOGIST PROVIDED HISTORY: Reason for exam:->Weakness/N/V FINDINGS: The cardiomediastinal silhouette is mildly enlarged in size and contour. Hyperinflation. Coarsened bibasilar interstitial thickening and left suprahilar opacity. .  New right upper lobe opacity. .  No pleural effusion or pneumothorax is present. Osteopenia. Stable upper lumbar compression fracture. Stable mid thoracic spine compression fracture. Hyperinflation. Coarsened bibasilar interstitial opacities with new right upper lobe airspace disease. Findings could represent interstitial alveolar edema versus developing pneumonia. Follow-up to assure resolution following medical treatment course recommended.     Xr Lumbar Spine (2-3 Views)    Result Date: 11/28/2020 EXAMINATION: THREE XRAY VIEWS OF THE LUMBAR SPINE 11/28/2020 12:55 am COMPARISON: CT lumbar spine October 25, 2020. HISTORY: ORDERING SYSTEM PROVIDED HISTORY: fall coccyx TECHNOLOGIST PROVIDED HISTORY: Reason for exam:->fall coccyx FINDINGS: There appears to be slight increased anterior wedging of the previously seen T12 fracture. Unchanged superior endplate compression deformity at L3. Normal alignment. Diffuse osteopenia. 1. There appears to be slight increased anterior wedging of the previously seen T12 fracture. 2. Unchanged superior endplate compression deformity at L3. Xr Sacrum Coccyx (min 2 Views)    Result Date: 11/28/2020  EXAMINATION: THREE XRAY VIEWS OF THE SACRUM/COCCYX 11/28/2020 12:55 am COMPARISON: May 18, 2020. Tello Reddy HISTORY: ORDERING SYSTEM PROVIDED HISTORY: fall TECHNOLOGIST PROVIDED HISTORY: Reason for exam:->fall FINDINGS: Diffuse osteopenia. No obvious sacral fracture. No acute findings and no significant change from prior study.     Ct Head Wo Contrast    Result Date: 12/9/2020 EXAMINATION: CT OF THE HEAD WITHOUT CONTRAST  12/9/2020 9:36 pm TECHNIQUE: CT of the head was performed without the administration of intravenous contrast. Dose modulation, iterative reconstruction, and/or weight based adjustment of the mA/kV was utilized to reduce the radiation dose to as low as reasonably achievable. COMPARISON: CT head from October 6, 2020 HISTORY: ORDERING SYSTEM PROVIDED HISTORY: fatigue TECHNOLOGIST PROVIDED HISTORY: Has a \"code stroke\" or \"stroke alert\" been called? ->No Reason for exam:->fatigue FINDINGS: BRAIN/VENTRICLES: Moderate to severe periventricular and deep white matter hypoattenuating regions are suggestive of areas of chronic microvascular ischemic change. There is no evidence of acute ischemia, hemorrhage, or herniation. The ventricular system is prominent which is slightly out of proportion to the degree of gyral atrophy. Intracranial atherosclerotic disease. ORBITS: The visualized portion of the orbits demonstrate no acute abnormality. SINUSES: The visualized paranasal sinuses and mastoid air cells demonstrate no acute abnormality. SOFT TISSUES/SKULL:  No acute abnormality of the visualized skull or soft tissues. Chronic deviation of the nasal septum. 1.  No acute intracranial abnormality. Moderate to severe senescent changes. Intracranial atherosclerotic disease. 2.  Mild prominence of ventricular system which is out of proportion to the degree of gyral atrophy. Consider normal pressure hydrocephalus in the right clinical setting.     Xr Chest Portable    Result Date: 12/9/2020 EXAMINATION: ONE XRAY VIEW OF THE CHEST 12/9/2020 7:26 pm COMPARISON: 11/30/2020 HISTORY: ORDERING SYSTEM PROVIDED HISTORY: failure to thrive TECHNOLOGIST PROVIDED HISTORY: Reason for exam:->failure to thrive FINDINGS: Heart and the mediastinal structures are normal.  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 Strandy and hazy perihilar densities are present with  hazy densities extending the lung bases. COPD with scarring with atelectasis/hazy infiltrative densities lung bases concerning for pneumonia. Xr Chest Portable    Result Date: 11/30/2020  EXAMINATION: ONE XRAY VIEW OF THE CHEST 11/30/2020 2:07 pm COMPARISON: November 27, 2020 HISTORY: ORDERING SYSTEM PROVIDED HISTORY: Follow-up on pneumonia TECHNOLOGIST PROVIDED HISTORY: Reason for exam:->Follow-up on pneumonia FINDINGS: Stable cardiomediastinal silhouette. There is mild pulmonary vascular congestion. Patchy bilateral opacities are not significantly changed. No pleural effusion or pneumothorax seen. There are degenerative changes in the shoulders and spine. 1.  Patchy bilateral opacities are not significantly changed. Differential includes atelectasis and infection. 2.  Mild pulmonary vascular congestion. Xr Chest Portable    Result Date: 11/27/2020  EXAMINATION: ONE XRAY VIEW OF THE CHEST 11/27/2020 1:07 pm COMPARISON: November 23, 2020 HISTORY: ORDERING SYSTEM PROVIDED HISTORY: Pneumonia follow-up TECHNOLOGIST PROVIDED HISTORY: Reason for exam:->Pneumonia follow-up FINDINGS: There is improved aeration of the lung bases since the prior examination. No convincing evidence of a focal consolidative airspace opacity, pneumothorax or pleural effusion. Heart size is unable to be accurately assessed on this single portable view of the chest, but appears to be stable. No acute osseous abnormality. Improved aeration of the lungs compared with the most recent examination. No radiographic evidence of acute cardiopulmonary process on today's study. Imaging and labs were reviewed per medical records and any ID pertinent labs were addressed with the patient. Thank you for involving me in the care of Stephanie Ovalle. Please do not hesitate to call for any questions or concerns.     Electronically signed by Nazia Stearns MD on 12/13/2020 at 7:07 PM    Phone [phone]  Fax [phone]    Electronically signed by Nazia Stearns MD on 12/13/2020 at 7:07 PM

## 2020-12-14 NOTE — PROGRESS NOTES
[facility] Progress Note    Date:  12/14/20  Hospital Day:  Hospital Day: 6  PT Name:  Veronica Bustamante  MRN:   [id number]  Primary Care Physician: Phong Andrews MD  Requesting physician:   Phong Andrews MD    Reason for Consultation:  Reason for follow up: antibiotics covid /pneumonia  Chief Complaint:   Chief Complaint   Patient presents with    Shortness of Breath     Recent positive Covid-19 test    Failure To Thrive     Not eating or drinking much per EMS     Subjective/HPI/:  Jose Turner was seen and examined at bedside today for covid/pneumonia  Overnight: none  70%FIO2  Calm   Afebrile has sitter     There are no adverse drug reactions noted    All tests were reviewed. ROS: unable due to pt's status        Assessment  Veronica Bustamante is a 79y.o. year old female who presented on 12/9/2020 and is being treated for <principal problem not specified>   Chief Complaint   Patient presents with    Shortness of Breath     Recent positive Covid-19 test    Failure To Thrive     Not eating or drinking much per EMS   SARS-COV-2- positive with aspiration pneumonia  rx hcap aspiration piptazo   S/p remdesivir  COVID-19 Convalescent Plasma ordered covid AB +  Leukocytosis follow   Cdiff vanco po   Sp rx VSE bacteremia off atbx     Plan    Con to wean o2   atbx piptazo     · Watch for cdiff colitis/clabsi-line infection  · Monitor labs   Covid-19, Antibody, Total    DNR comfort care    Inpatient consult to Pulmonology    Prepare COVID-19 Convalescent Plasma, 2 Units    Prepare COVID-19 Convalescent Plasma     · Continue current therapy. · Please see orders for further management and care.     Hospital Medications      LORazepam (ATIVAN) injection 0.5 mg, Q8H PRN      [START ON 12/15/2020] dexamethasone (DECADRON) injection 4 mg, Q24H      warfarin (COUMADIN) tablet 5 mg, Daily      0.9 % sodium chloride infusion, Continuous      morphine (PF) injection 2 mg, Q1H PRN    Or      morphine injection 4 mg, Q1H PRN   glucose (GLUTOSE) 40 % oral gel 15 g, PRN      dextrose 50 % IV solution, PRN      glucagon (rDNA) injection 1 mg, PRN      dextrose 5 % solution, PRN      acetaminophen (TYLENOL) tablet 500 mg, Q6H PRN      budesonide-formoterol (SYMBICORT) 80-4.5 MCG/ACT inhaler 2 puff, BID      montelukast (SINGULAIR) tablet 10 mg, Nightly      glimepiride (AMARYL) tablet 2 mg, QAM AC      lactobacillus (CULTURELLE) capsule 1 capsule, Q12H      Vitamin D (CHOLECALCIFEROL) tablet 2,000 Units, Daily      rosuvastatin (CRESTOR) tablet 20 mg, Nightly      losartan (COZAAR) tablet 50 mg, Nightly      amLODIPine (NORVASC) tablet 5 mg, Daily      calcium-vitamin D (OSCAL-500) 500-200 MG-UNIT per tablet 2 tablet, BID      vancomycin (VANCOCIN) oral solution 125 mg, 4x daily      levothyroxine (SYNTHROID) tablet 137 mcg, Daily      vitamin B and C (TOTAL B-C) 1 tablet, Daily      pantoprazole (PROTONIX) tablet 40 mg, QAM AC      albuterol-ipratropium (COMBIVENT RESPIMAT)  MCG/ACT inhaler 1 puff, Q6H PRN      tiotropium (SPIRIVA) inhalation capsule 18 mcg, Daily      0.9 % sodium chloride bolus, Once      piperacillin-tazobactam (ZOSYN) 3.375 g in dextrose 5 % 50 mL IVPB extended infusion (mini-bag), Q8H      0.9 % sodium chloride infusion, Q8H      metoprolol succinate (TOPROL XL) extended release tablet 50 mg, Daily      zinc sulfate (ZINCATE) capsule 50 mg, Daily      vitamin C (ASCORBIC ACID) tablet 1,000 mg, Daily        PRN Medications  LORazepam, morphine **OR** morphine, glucose, dextrose, glucagon (rDNA), dextrose, acetaminophen, albuterol-ipratropium  Allergies   Allergen Reactions    Latex Itching and Rash    Codeine Anxiety    Morphine Anxiety     Objective  Most Recent Recorded Vitals  Vitals:    12/14/20 0950   BP: 137/64   Pulse: 120   Resp: 22   Temp: 97.1 °F (36.2 °C)   SpO2: 98%     I/O last 3 completed shifts: In: 0   Out: 1000 [Urine:1000]  No intake/output data recorded. Physical Exam:  General:  Awake NAD calm  Heent:   AT/NC Conjunctivae/corneas clear, Sclera non icteric. Skin:   Color, texture, and turgor normal. No rashes or lesions. Lungs:   Dec  to auscultation bilaterally. No retractions or use of accessory muscles. Heart:   Regular rate and regular rhythm, no murmur  Abdomen:   Soft, non-tender; bowel sounds normal   Extremities:  Atraumatic, no cyanosis, no edema  Neurologic:  calm  Psych:   Pleasant  calm  Lines   PIv  Stark yellow    DATA:  Microbiology   BLOOD CX #1  No results for input(s): BC in the last 72 hours. BLOOD CX #2  No results for input(s): Kateryna Canter in the last 72 hours.    SARS-COV-2 biomarkers  Recent Labs     12/12/20 1858 12/14/20 0053 12/14/20  0736   CRP 17.6*  --   --    FERRITIN 1,576  --   --    *  --   --    TROPONINI <0.01  --   --    DDIMER 717  --   --    FIBRINOGEN >700*  --   --    INR 1.2 1.1 1.0   PROTIME 14.1* 12.2 11.9   AST 32* 31 29   ALT 20 19 17     Lab Results   Component Value Date    CHOL 180 11/25/2020    TRIG 131 11/25/2020    HDL 67 11/25/2020    LDLCALC 87 11/25/2020    LABVLDL 26 11/25/2020     Lab Results   Component Value Date/Time    VITD25 17 (L) 11/25/2020 08:26 AM     Recent Labs     12/12/20 1858 12/14/20  0736   WBC 14.1* 14.9*   HGB 12.2 11.8   HCT 37.5 37.2    345   MCV 83.5 85.7   MCH 27.2 27.2   MCHC 32.5 31.7*   RDW 15.7* 15.9*   LYMPHOPCT 6.1* 5.7*   MONOPCT 3.3 3.6   BASOPCT 0.1 0.1   MONOSABS 0.47 0.54   LYMPHSABS 0.86* 0.85*   EOSABS 0.00* 0.00*   BASOSABS 0.01 0.02     Recent Labs     12/12/20 1858 12/14/20 0053 12/14/20  0736    139 141   K 3.3* 3.3* 3.2*   CL 97* 103 103   CO2 21* 22 22   BUN 25* 21 22   CREATININE 1.1* 0.9 1.0   GFRAA 58 >60 >60   LABGLOM 48 >60 53   GLUCOSE 221* 220* 228*   PROT 6.3* 6.3* 6.3*   LABALBU 2.3* 2.0* 2.3*   CALCIUM 9.3 9.4 9.4   BILITOT 0.3 0.4 0.4   ALKPHOS 100 101 170*   AST 32* 31 29   ALT 20 19 17     U/A:    Lab Results Component Value Date    COLORU Yellow 12/09/2020    PROTEINU >=300 12/09/2020    PHUR 6.5 12/09/2020    LABCAST RARE 11/11/2018    WBCUA 0-1 12/09/2020    WBCUA 2-5 04/07/2011    RBCUA 1-3 12/09/2020    RBCUA 1-3 01/22/2014    BACTERIA RARE 12/09/2020    CLARITYU Clear 12/09/2020    SPECGRAV 1.025 12/09/2020    LEUKOCYTESUR Negative 12/09/2020    UROBILINOGEN 0.2 12/09/2020    BILIRUBINUR Negative 12/09/2020    BILIRUBINUR NEGATIVE 04/07/2011    BLOODU TRACE 12/09/2020    GLUCOSEU Negative 12/09/2020    GLUCOSEU NEGATIVE 04/07/2011       Lab Results   Component Value Date    CRP 17.6 (H) 12/12/2020    CRP 2.8 (H) 11/25/2020    CRP 4.1 (H) 11/24/2020     Lab Results   Component Value Date    SEDRATE 111 (H) 12/12/2020    SEDRATE 86 (H) 12/29/2019    SEDRATE 84 (H) 12/29/2019       Lab Results   Component Value Date    BC 24 Hours no growth 12/09/2020    BC 5 Days no growth 11/20/2020    BC Beta Lactamase negative 11/17/2020    BC 5 Days- no growth 05/25/2020    BC 5 Days- no growth 12/29/2019    BC 5 Days- no growth 12/27/2019    BC 5 Days- no growth 11/11/2018    BC 5 Days- no growth 02/17/2016     Urine Culture, Routine   Date Value Ref Range Status   12/09/2020 Growth not present  Final   11/17/2020 >100,000 CFU/ml  Final   05/25/2020   Final    <10,000 CFU/mL  Mixed gram positive organisms  Gram negative rods       Organism   Date Value Ref Range Status   11/17/2020 Enterococcus faecium (A)  Final   11/17/2020 Enterococcus faecium (A)  Final   11/17/2020 Staphylococcus coagulase-negative (A)  Final       Echo Transesophageal    Result Date: 11/23/2020 ----------------------------------------------------------------  Electronically signed by Zenia Jeffrey MD(Interpreting  physician) on 11/23/2020 03:51 PM  ----------------------------------------------------------------    Xr Chest (2 Vw)    Result Date: 11/23/2020  EXAMINATION: TWO XRAY VIEWS OF THE CHEST 11/23/2020 9:05 am COMPARISON: Comparison is dated November 17, 2020 HISTORY: ORDERING SYSTEM PROVIDED HISTORY: Follow-up pneumonia TECHNOLOGIST PROVIDED HISTORY: Reason for exam:->Follow-up pneumonia FINDINGS: Streaky opacity in the right upper lobe apex is unchanged. There is some increased patchy and linear opacity at the right base on the left parahilar region new since the prior study. There is diffuse bilateral interstitial prominence. Cardiac and mediastinal contours do not appear changed    Increasing bilateral pulmonary infiltrates/atelectasis    Xr Chest (2 Vw)    Result Date: 11/17/2020  EXAMINATION: TWO XRAY VIEWS OF THE CHEST 11/17/2020 6:25 pm COMPARISON: 10/06/2020 HISTORY: ORDERING SYSTEM PROVIDED HISTORY: Weakness/N/V TECHNOLOGIST PROVIDED HISTORY: Reason for exam:->Weakness/N/V FINDINGS: The cardiomediastinal silhouette is mildly enlarged in size and contour. Hyperinflation. Coarsened bibasilar interstitial thickening and left suprahilar opacity. .  New right upper lobe opacity. .  No pleural effusion or pneumothorax is present. Osteopenia. Stable upper lumbar compression fracture. Stable mid thoracic spine compression fracture. Hyperinflation. Coarsened bibasilar interstitial opacities with new right upper lobe airspace disease. Findings could represent interstitial alveolar edema versus developing pneumonia. Follow-up to assure resolution following medical treatment course recommended.     Xr Lumbar Spine (2-3 Views)    Result Date: 11/28/2020 EXAMINATION: THREE XRAY VIEWS OF THE LUMBAR SPINE 11/28/2020 12:55 am COMPARISON: CT lumbar spine October 25, 2020. HISTORY: ORDERING SYSTEM PROVIDED HISTORY: fall coccyx TECHNOLOGIST PROVIDED HISTORY: Reason for exam:->fall coccyx FINDINGS: There appears to be slight increased anterior wedging of the previously seen T12 fracture. Unchanged superior endplate compression deformity at L3. Normal alignment. Diffuse osteopenia. 1. There appears to be slight increased anterior wedging of the previously seen T12 fracture. 2. Unchanged superior endplate compression deformity at L3. Xr Sacrum Coccyx (min 2 Views)    Result Date: 11/28/2020  EXAMINATION: THREE XRAY VIEWS OF THE SACRUM/COCCYX 11/28/2020 12:55 am COMPARISON: May 18, 2020. Josep Kwok HISTORY: ORDERING SYSTEM PROVIDED HISTORY: fall TECHNOLOGIST PROVIDED HISTORY: Reason for exam:->fall FINDINGS: Diffuse osteopenia. No obvious sacral fracture. No acute findings and no significant change from prior study.     Ct Head Wo Contrast    Result Date: 12/9/2020 EXAMINATION: CT OF THE HEAD WITHOUT CONTRAST  12/9/2020 9:36 pm TECHNIQUE: CT of the head was performed without the administration of intravenous contrast. Dose modulation, iterative reconstruction, and/or weight based adjustment of the mA/kV was utilized to reduce the radiation dose to as low as reasonably achievable. COMPARISON: CT head from October 6, 2020 HISTORY: ORDERING SYSTEM PROVIDED HISTORY: fatigue TECHNOLOGIST PROVIDED HISTORY: Has a \"code stroke\" or \"stroke alert\" been called? ->No Reason for exam:->fatigue FINDINGS: BRAIN/VENTRICLES: Moderate to severe periventricular and deep white matter hypoattenuating regions are suggestive of areas of chronic microvascular ischemic change. There is no evidence of acute ischemia, hemorrhage, or herniation. The ventricular system is prominent which is slightly out of proportion to the degree of gyral atrophy. Intracranial atherosclerotic disease. ORBITS: The visualized portion of the orbits demonstrate no acute abnormality. SINUSES: The visualized paranasal sinuses and mastoid air cells demonstrate no acute abnormality. SOFT TISSUES/SKULL:  No acute abnormality of the visualized skull or soft tissues. Chronic deviation of the nasal septum. 1.  No acute intracranial abnormality. Moderate to severe senescent changes. Intracranial atherosclerotic disease. 2.  Mild prominence of ventricular system which is out of proportion to the degree of gyral atrophy. Consider normal pressure hydrocephalus in the right clinical setting.     Xr Chest Portable    Result Date: 12/9/2020 EXAMINATION: ONE XRAY VIEW OF THE CHEST 12/9/2020 7:26 pm COMPARISON: 11/30/2020 HISTORY: ORDERING SYSTEM PROVIDED HISTORY: failure to thrive TECHNOLOGIST PROVIDED HISTORY: Reason for exam:->failure to thrive FINDINGS: Heart and the mediastinal structures are normal.  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 Strandy and hazy perihilar densities are present with  hazy densities extending the lung bases. COPD with scarring with atelectasis/hazy infiltrative densities lung bases concerning for pneumonia. Xr Chest Portable    Result Date: 11/30/2020  EXAMINATION: ONE XRAY VIEW OF THE CHEST 11/30/2020 2:07 pm COMPARISON: November 27, 2020 HISTORY: ORDERING SYSTEM PROVIDED HISTORY: Follow-up on pneumonia TECHNOLOGIST PROVIDED HISTORY: Reason for exam:->Follow-up on pneumonia FINDINGS: Stable cardiomediastinal silhouette. There is mild pulmonary vascular congestion. Patchy bilateral opacities are not significantly changed. No pleural effusion or pneumothorax seen. There are degenerative changes in the shoulders and spine. 1.  Patchy bilateral opacities are not significantly changed. Differential includes atelectasis and infection. 2.  Mild pulmonary vascular congestion. Xr Chest Portable    Result Date: 11/27/2020  EXAMINATION: ONE XRAY VIEW OF THE CHEST 11/27/2020 1:07 pm COMPARISON: November 23, 2020 HISTORY: ORDERING SYSTEM PROVIDED HISTORY: Pneumonia follow-up TECHNOLOGIST PROVIDED HISTORY: Reason for exam:->Pneumonia follow-up FINDINGS: There is improved aeration of the lung bases since the prior examination. No convincing evidence of a focal consolidative airspace opacity, pneumothorax or pleural effusion. Heart size is unable to be accurately assessed on this single portable view of the chest, but appears to be stable. No acute osseous abnormality. Improved aeration of the lungs compared with the most recent examination. No radiographic evidence of acute cardiopulmonary process on today's study. Imaging and labs were reviewed per medical records and any ID pertinent labs were addressed with the patient. Thank you for involving me in the care of Joy Gross. Please do not hesitate to call for any questions or concerns.     Electronically signed by Peña Polk MD on 12/14/2020 at 3:05 PM    Phone [phone]  Fax [phone]    Electronically signed by Peña Plok MD on 12/14/2020 at 3:05 PM

## 2020-12-14 NOTE — CARE COORDINATION
SS NOTE: COVID POSITIVE 12/10. SW to investigating [employer] following, [facility] following and [employer] following- they are concerned that with pt not able to participate in PT/OT and not eating there may not be a skill for insurance to pay for. For the SNF pt will not need a PRECERT (however will need to have a reason for insurance to coverage her at SNF). She will also need a signed DAIJA, current PT/OT notes and SW will need to complete the HENs. Emma Rendon. 12/14/2020.9:29 AM.

## 2020-12-14 NOTE — PROGRESS NOTES
Date:2020  Patient Name: Mona Goff  MRN: [id number]  : 1940  ROOM #: [id number]    Occupational Therapy order received, chart reviewed and evaluation attempted this date. Patient is unavailable for OT evaluation due to  no treatment recommended per RN. Family is considering home with [facility]. Will attempt OT evaluation at a later time. Thank you.    Camillo Cogan OTR/L #[id number]

## 2020-12-14 NOTE — FLOWSHEET NOTE
Attempted twice to place NG tube per doctor order. Pt did not tolerate placement. Pt became tachycardic with heart rate increasing to 190's. Pt began to cough uncontrollably producing thick and large amounts of secretions in mouth. Pt had to be suctioned repeatedly. Unable to place NG tube. Dr. Tereza Christiansen office aware and will relay message.

## 2020-12-14 NOTE — PROGRESS NOTES
[facility]          Liaison Information Visit Note              Patient Name: Heavenly Jansen   :  1940  MRN:  [id number]    Admit date:  2020    Admitted from: home  Hospital Admitting Physician:  Colletta Cough, MD   PCP:  Colletta Cough, MD      Primary Insurance: Payor: Leann Buck /  /  /    Secondary Insurance:  unknown    Emergency Contact:      Contact/Relation:   /         Phone:       Contact/Relation:   /     Phone:     Advance Directive  Advance directives received No  Patient has completed an advance directive   Discussed with: Family member  DPOA-HC Name-Relation:    Phone:       Terminal Diagnosis respiratory failure due to COVID pneumonia as confirmed by Dr. 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 difficile colitis A04.72    Acute urinary tract infection N39.0    Fluid overload E87.70    Lethargic R53.83    COVID-19 virus detected U07.1  Acute on chronic respiratory failure with hypoxia (HCC) J96.21    Acute on chronic respiratory failure with hypoxemia (HCC) J96.21       Code Status Order: DNR-CC     Past Medical History:        Diagnosis Date    Arthritis     joints, knees, back  djd    Cerebral artery occlusion with cerebral infarction [address]     Chronic back pain     COPD (chronic obstructive pulmonary disease) ([facility]     Diabetes mellitus (HCC)     GERD (gastroesophageal reflux disease)     H/O cardiovascular stress test 11/12/2018    lexiscan stress test    Hyperlipidemia     Thyroid disease      Past Surgical History:        Procedure Laterality Date    APPENDECTOMY      BREAST SURGERY      cyst     CHOLECYSTECTOMY      COLONOSCOPY      ENDOSCOPY, COLON, DIAGNOSTIC      HYSTERECTOMY      JOINT REPLACEMENT      left knee    KNEE ARTHROPLASTY Right 1/15/2014    TOTAL KNEE ARTHROPLASTY    THYROIDECTOMY      TRANSESOPHAGEAL ECHOCARDIOGRAM N/A 11/23/2020    TRANSESOPHAGEAL ECHOCARDIOGRAM performed by Jessica Cortez MD at [facility] ENDOSCOPY       Allergies:  Latex, Codeine, and Morphine    Family Goal: comfort    Meeting held with spoke with daughter Tristian Nesbitt over the phone. The hospice benefit and philosophy were explained including that hospice is end of life care in which, per Medicare, a patient has a terminal diagnosis that life expectancy would be 6 months or less. Hospice care is a service that is covered by most insurance plans. The following levels of hospice care were discussed including, routine level of hospice care at private home or facility, which patient/family is responsible for any room and board fees at the facility, and general in patient level of care (GIP) at the [facility] for short term symptom management. Per Medicare guidelines, a patient is considered appropriate for GIP if they have uncontrolled symptoms such as pain, agitation, labored breathing or nausea/vomiting. Once symptoms become managed, the patient would need to be moved to a lower level of care such as home with hospice, ECF with hospice or the Transition program.  [facility] transition program also explained which is routine hospice care provided at the [facility] instead of an ECF or home. The transition program is private pay $300/day for room/board. Room/board for the transition program is not covered by Medicaid as would be in an ECF. Family informed that with the routine level of care at home or ECF,  the hospice team consists of the RN who visits 1-3 times a week, a  who visits within the first five days of the hospice election, the personal care team who visit 1-3 times a week, non-medical volunteers and Chaplains. Explained that at home in routine level of care, familles are responsible for the 24 hour care. Discussed that under hospice care patient would not receive chemotherapy, radiation, immune therapy, IV antibiotics, dialysis or blood transfusions. Explained that once in hospice care, all aggressive treatments would be stopped and allow nature to takes its course with focus on comfort care for the patient. Maicol Leach wants to bring patient home with hospice. I did explain that patient will not have continuous bipap and the most oxygen will be 10 liters at home. Sharon verbalizes understanding. Sharon states she promised patient she would be at home and never in a nursing home. I explained that patient can be medicated with comfort medications first then have bipap removed. Patient then can transport on 15 liters with drop to 10 once home so that patient can pass at home. Sharon is in agreement with this. Equipment needs of oxygen and hospital bed to be delivered tomorrow 10 am to 2 pm.  Will set up for transport once equipment has arrived at home. Updated SW. Discharge Plan:  Discharge Disposition; home  Facility Name: none    HOT plan:  1.  plan is to discharge home tomorrow with [facility]. 2. Please call [facility] [phone] with any questions. 3. Patient not currently under the care of hospice. Spoke with Dr. Ryan Post office and he will follow under hospice care.   Electronically signed by Lesly Quesada RN on 12/14/2020 at 3:43 PM

## 2020-12-14 NOTE — CARE COORDINATION
SS NOTE: COVID POSITIVE 12/10. SW recd a call from dtr and she would consider taking pt home with Hospice and requests [facility]. SW completed a referral to [facility] today and await contact from the Liaison. [facility] is able to accept pt on the continuous Bipap, possibly today if pt is stable to transport. Neither [facility] or [facility] is able to accept this pt. [facility] relates that as long as the family is willing to complete a medicaid application once pt is there they will attempt to submit for skilled time with pt's insurance. SW spoke with pt's dtr Sharon and she accepts this facilty and wants to proceed with admission there. For the SNF placement pt will need a signed DAIJA, current PT/OT notes and SW completed the PASRR. Keenan Rendon. 12/14/2020.1:06PM.

## 2020-12-14 NOTE — PROGRESS NOTES
Department of Internal Medicine  General Internal Medicine  Attending Progress Note      Subjective: The patient has been pulling all the tubing and thrashing through the morning. She has been confused. Trial to insert NG failed secondary to tachycardia and patient not tolerating it. Her pulse ox have been stable. Objective:    Pt /64   Pulse 120   Temp 97.1 °F (36.2 °C) (Infrared)   Resp 22   Ht 5' 2\" (1.575 m)   Wt 123 lb (55.8 kg)   SpO2 98%   BMI 22.50 kg/m²     Head and Neck: normal atraumatic,   Skin: No rashes, lesions, or ecchymosis, no ulcers.      Psych: Positive apparent anxiety, agitation    Labs:   CBC with Differential:    Lab Results   Component Value Date    WBC 14.9 12/14/2020    RBC 4.34 12/14/2020    HGB 11.8 12/14/2020    HCT 37.2 12/14/2020     12/14/2020    MCV 85.7 12/14/2020    MCH 27.2 12/14/2020    MCHC 31.7 12/14/2020    RDW 15.9 12/14/2020    SEGSPCT 64 02/15/2013    LYMPHOPCT 5.7 12/14/2020    MONOPCT 3.6 12/14/2020    MYELOPCT 0.9 11/11/2018    BASOPCT 0.1 12/14/2020    MONOSABS 0.54 12/14/2020    LYMPHSABS 0.85 12/14/2020    EOSABS 0.00 12/14/2020    BASOSABS 0.02 12/14/2020     CMP:    Lab Results   Component Value Date     12/14/2020    K 3.2 12/14/2020     12/14/2020    CO2 22 12/14/2020    BUN 22 12/14/2020    CREATININE 1.0 12/14/2020    GFRAA >60 12/14/2020    LABGLOM 53 12/14/2020    GLUCOSE 228 12/14/2020    GLUCOSE 158 10/03/2011    PROT 6.3 12/14/2020    LABALBU 2.3 12/14/2020    LABALBU 4.0 10/03/2011    CALCIUM 9.4 12/14/2020    BILITOT 0.4 12/14/2020    ALKPHOS 170 12/14/2020    AST 29 12/14/2020    ALT 17 12/14/2020     Warfarin PT/INR:  No components found for: PTPATWAR, PTINRWAR        chest x-ray  EXAMINATION:   ONE XRAY VIEW OF THE CHEST   12/9/2020 7:26 pm   COMPARISON:   11/30/2020   HISTORY:   ORDERING SYSTEM PROVIDED HISTORY: failure to thrive   TECHNOLOGIST PROVIDED HISTORY:   Reason for exam:->failure to thrive FINDINGS:   Heart and the mediastinal structures are normal.  There is COPD with scarring   and fibrosis.  Strandy and hazy perihilar densities are present with  hazy   densities extending the lung bases.       Impression   COPD with scarring with atelectasis/hazy infiltrative densities lung bases   concerning for pneumonia. Assessment:    Active Problems:    Diabetes mellitus type II, controlled ([facility] [address]    Chronic obstructive pulmonary disease (HCC)    Hyponatremia    Altered mental status    Generalized weakness    Essential hypertension    Mixed hyperlipidemia    Tachycardia    Pneumonia    COVID-19 virus detected    Acute on chronic respiratory failure with hypoxia (HCC)    Acute on chronic respiratory failure with hypoxemia (HCC)  Resolved Problems:    * No resolved hospital problems. *      Plan:  1. Continue current management  2. Awaiting nursing home placement  3. We will check with family regarding a PEG tube insertion  4. Replace potassium  5.  Decrease Decadron    See orders    Electronically signed by Cyndie Pavon MD on 12/14/2020 at 11:59 AM

## 2020-12-14 NOTE — PROGRESS NOTES
Patient aware of results below. He verbalized understanding and will do as advised. He will return in one month for repeat labs   Patient destated to low 70's when going to sleep, tried non-rebreather 15 L and nasal cannula 15 liters, but patient did not recover. Respiratory notified and patient placed on bipap, resting comfortably O2 stat 94%. Will continue to monitor.

## 2020-12-15 NOTE — PROGRESS NOTES
[facility] Progress Note    Date:  12/15/20  Hospital Day:  Hospital Day: 7  PT Name:  Belkis Braun  MRN:   [id number]  Primary Care Physician: Lon Heller MD  Requesting physician:   Lon Heller MD    Reason for Consultation:  Reason for follow up: antibiotics covid /pneumonia  Chief Complaint:   Chief Complaint   Patient presents with    Shortness of Breath     Recent positive Covid-19 test    Failure To Thrive     Not eating or drinking much per EMS     Subjective/HPI/:  Jamie Beal was seen and examined at bedside today for covid/pneumonia  Overnight: none has sitter  Helped place her on her side  70%FIO2 bipap        There are no adverse drug reactions noted    All tests were reviewed. ROS: unable due to pt's status        Assessment  Belkis Braun is a 79y.o. year old female who presented on 12/9/2020 and is being treated for <principal problem not specified>   Chief Complaint   Patient presents with    Shortness of Breath     Recent positive Covid-19 test    Failure To Thrive     Not eating or drinking much per EMS   SARS-COV-2- positive with aspiration pneumonia  rx hcap aspiration piptazo   S/p remdesivir  COVID-19 Convalescent Plasma was ordered covid AB + will not need now  Leukocytosis follow   Cdiff vanco po   Sp rx VSE bacteremia off atbx     Plan    Con to wean o2   atbx piptazo plan 8 days     · Watch for cdiff colitis/clabsi-line infection  · Monitor labs   Covid-19, Antibody, Total +    DNR comfort care    Inpatient consult to Pulmonology    Prepare COVID-19 Convalescent Plasma, 2 Units    Prepare COVID-19 Convalescent Plasma     · Continue current therapy. · Please see orders for further management and care.     Hospital Medications      LORazepam (ATIVAN) injection 0.5 mg, Q8H PRN      dexamethasone (DECADRON) injection 4 mg, Q24H      warfarin (COUMADIN) tablet 5 mg, Daily      0.9 % sodium chloride infusion, Continuous      morphine (PF) injection 2 mg, Q1H PRN    Or   morphine injection 4 mg, Q1H PRN      glucose (GLUTOSE) 40 % oral gel 15 g, PRN      dextrose 50 % IV solution, PRN      glucagon (rDNA) injection 1 mg, PRN      dextrose 5 % solution, PRN      acetaminophen (TYLENOL) tablet 500 mg, Q6H PRN      budesonide-formoterol (SYMBICORT) 80-4.5 MCG/ACT inhaler 2 puff, BID      montelukast (SINGULAIR) tablet 10 mg, Nightly      glimepiride (AMARYL) tablet 2 mg, QAM AC      lactobacillus (CULTURELLE) capsule 1 capsule, Q12H      Vitamin D (CHOLECALCIFEROL) tablet 2,000 Units, Daily      rosuvastatin (CRESTOR) tablet 20 mg, Nightly      losartan (COZAAR) tablet 50 mg, Nightly      amLODIPine (NORVASC) tablet 5 mg, Daily      calcium-vitamin D (OSCAL-500) 500-200 MG-UNIT per tablet 2 tablet, BID      vancomycin (VANCOCIN) oral solution 125 mg, 4x daily      levothyroxine (SYNTHROID) tablet 137 mcg, Daily      vitamin B and C (TOTAL B-C) 1 tablet, Daily      pantoprazole (PROTONIX) tablet 40 mg, QAM AC      albuterol-ipratropium (COMBIVENT RESPIMAT)  MCG/ACT inhaler 1 puff, Q6H PRN      tiotropium (SPIRIVA) inhalation capsule 18 mcg, Daily      0.9 % sodium chloride bolus, Once      piperacillin-tazobactam (ZOSYN) 3.375 g in dextrose 5 % 50 mL IVPB extended infusion (mini-bag), Q8H      0.9 % sodium chloride infusion, Q8H      metoprolol succinate (TOPROL XL) extended release tablet 50 mg, Daily      zinc sulfate (ZINCATE) capsule 50 mg, Daily      vitamin C (ASCORBIC ACID) tablet 1,000 mg, Daily        PRN Medications  LORazepam, morphine **OR** morphine, glucose, dextrose, glucagon (rDNA), dextrose, acetaminophen, albuterol-ipratropium  Allergies   Allergen Reactions    Latex Itching and Rash    Codeine Anxiety    Morphine Anxiety     Objective  Most Recent Recorded Vitals  Vitals:    12/15/20 0915   BP: 128/83   Pulse: 105   Resp: 16   Temp: 97.5 °F (36.4 °C)   SpO2: 100%     I/O last 3 completed shifts:   In: 100 [IV Piggyback:100] Out: 675 [Urine:675]  No intake/output data recorded. Physical Exam:  General:  nad  Heent:   AT/NC    Skin:    No rashes or lesions. Lungs:   Dec  to auscultation bilaterally ant . Heart:   Regular rate and regular rhythm   Abdomen:   Soft, non-tender; bowel sounds normal   Extremities:    no edema  Neurologic:   lethargic  Psych:      Lines   PIv  Stark yellow    DATA:  Microbiology   BLOOD CX #1  No results for input(s): BC in the last 72 hours. BLOOD CX #2  No results for input(s): Starlene Ewings in the last 72 hours.    SARS-COV-2 biomarkers  Recent Labs     12/12/20 1858 12/14/20 0053 12/14/20  0736   CRP 17.6*  --   --    FERRITIN 1,576  --   --    *  --   --    TROPONINI <0.01  --   --    DDIMER 717  --   --    FIBRINOGEN >700*  --   --    INR 1.2 1.1 1.0   PROTIME 14.1* 12.2 11.9   AST 32* 31 29   ALT 20 19 17     Lab Results   Component Value Date    CHOL 180 11/25/2020    TRIG 131 11/25/2020    HDL 67 11/25/2020    LDLCALC 87 11/25/2020    LABVLDL 26 11/25/2020     Lab Results   Component Value Date/Time    VITD25 17 (L) 11/25/2020 08:26 AM     Recent Labs     12/12/20 1858 12/14/20  0736   WBC 14.1* 14.9*   HGB 12.2 11.8   HCT 37.5 37.2    345   MCV 83.5 85.7   MCH 27.2 27.2   MCHC 32.5 31.7*   RDW 15.7* 15.9*   LYMPHOPCT 6.1* 5.7*   MONOPCT 3.3 3.6   BASOPCT 0.1 0.1   MONOSABS 0.47 0.54   LYMPHSABS 0.86* 0.85*   EOSABS 0.00* 0.00*   BASOSABS 0.01 0.02     Recent Labs     12/12/20 1858 12/14/20 0053 12/14/20  0736    139 141   K 3.3* 3.3* 3.2*   CL 97* 103 103   CO2 21* 22 22   BUN 25* 21 22   CREATININE 1.1* 0.9 1.0   GFRAA 58 >60 >60   LABGLOM 48 >60 53   GLUCOSE 221* 220* 228*   PROT 6.3* 6.3* 6.3*   LABALBU 2.3* 2.0* 2.3*   CALCIUM 9.3 9.4 9.4   BILITOT 0.3 0.4 0.4   ALKPHOS 100 101 170*   AST 32* 31 29   ALT 20 19 17     U/A:    Lab Results   Component Value Date    COLORU Yellow 12/09/2020    PROTEINU >=300 12/09/2020    PHUR 6.5 12/09/2020    LABCAST RARE 11/11/2018 WBCUA 0-1 12/09/2020    WBCUA 2-5 04/07/2011    RBCUA 1-3 12/09/2020    RBCUA 1-3 01/22/2014    BACTERIA RARE 12/09/2020    CLARITYU Clear 12/09/2020    SPECGRAV 1.025 12/09/2020    LEUKOCYTESUR Negative 12/09/2020    UROBILINOGEN 0.2 12/09/2020    BILIRUBINUR Negative 12/09/2020    BILIRUBINUR NEGATIVE 04/07/2011    BLOODU TRACE 12/09/2020    GLUCOSEU Negative 12/09/2020    GLUCOSEU NEGATIVE 04/07/2011       Lab Results   Component Value Date    CRP 17.6 (H) 12/12/2020    CRP 2.8 (H) 11/25/2020    CRP 4.1 (H) 11/24/2020     Lab Results   Component Value Date    SEDRATE 111 (H) 12/12/2020    SEDRATE 86 (H) 12/29/2019    SEDRATE 84 (H) 12/29/2019       Lab Results   Component Value Date    BC 5 Days no growth 12/09/2020    BC 5 Days no growth 11/20/2020    BC Beta Lactamase negative 11/17/2020    BC 5 Days- no growth 05/25/2020    BC 5 Days- no growth 12/29/2019    BC 5 Days- no growth 12/27/2019    BC 5 Days- no growth 11/11/2018    BC 5 Days- no growth 02/17/2016     Urine Culture, Routine   Date Value Ref Range Status   12/09/2020 Growth not present  Final   11/17/2020 >100,000 CFU/ml  Final   05/25/2020   Final    <10,000 CFU/mL  Mixed gram positive organisms  Gram negative rods       Organism   Date Value Ref Range Status   11/17/2020 Enterococcus faecium (A)  Final   11/17/2020 Enterococcus faecium (A)  Final   11/17/2020 Staphylococcus coagulase-negative (A)  Final       Echo Transesophageal    Result Date: 11/23/2020 ----------------------------------------------------------------  Electronically signed by Dea Oconnor MD(Interpreting  physician) on 11/23/2020 03:51 PM  ----------------------------------------------------------------    Xr Chest (2 Vw)    Result Date: 11/23/2020  EXAMINATION: TWO XRAY VIEWS OF THE CHEST 11/23/2020 9:05 am COMPARISON: Comparison is dated November 17, 2020 HISTORY: ORDERING SYSTEM PROVIDED HISTORY: Follow-up pneumonia TECHNOLOGIST PROVIDED HISTORY: Reason for exam:->Follow-up pneumonia FINDINGS: Streaky opacity in the right upper lobe apex is unchanged. There is some increased patchy and linear opacity at the right base on the left parahilar region new since the prior study. There is diffuse bilateral interstitial prominence. Cardiac and mediastinal contours do not appear changed    Increasing bilateral pulmonary infiltrates/atelectasis    Xr Chest (2 Vw)    Result Date: 11/17/2020  EXAMINATION: TWO XRAY VIEWS OF THE CHEST 11/17/2020 6:25 pm COMPARISON: 10/06/2020 HISTORY: ORDERING SYSTEM PROVIDED HISTORY: Weakness/N/V TECHNOLOGIST PROVIDED HISTORY: Reason for exam:->Weakness/N/V FINDINGS: The cardiomediastinal silhouette is mildly enlarged in size and contour. Hyperinflation. Coarsened bibasilar interstitial thickening and left suprahilar opacity. .  New right upper lobe opacity. .  No pleural effusion or pneumothorax is present. Osteopenia. Stable upper lumbar compression fracture. Stable mid thoracic spine compression fracture. Hyperinflation. Coarsened bibasilar interstitial opacities with new right upper lobe airspace disease. Findings could represent interstitial alveolar edema versus developing pneumonia. Follow-up to assure resolution following medical treatment course recommended.     Xr Lumbar Spine (2-3 Views)    Result Date: 11/28/2020 EXAMINATION: CT OF THE HEAD WITHOUT CONTRAST  12/9/2020 9:36 pm TECHNIQUE: CT of the head was performed without the administration of intravenous contrast. Dose modulation, iterative reconstruction, and/or weight based adjustment of the mA/kV was utilized to reduce the radiation dose to as low as reasonably achievable. COMPARISON: CT head from October 6, 2020 HISTORY: ORDERING SYSTEM PROVIDED HISTORY: fatigue TECHNOLOGIST PROVIDED HISTORY: Has a \"code stroke\" or \"stroke alert\" been called? ->No Reason for exam:->fatigue FINDINGS: BRAIN/VENTRICLES: Moderate to severe periventricular and deep white matter hypoattenuating regions are suggestive of areas of chronic microvascular ischemic change. There is no evidence of acute ischemia, hemorrhage, or herniation. The ventricular system is prominent which is slightly out of proportion to the degree of gyral atrophy. Intracranial atherosclerotic disease. ORBITS: The visualized portion of the orbits demonstrate no acute abnormality. SINUSES: The visualized paranasal sinuses and mastoid air cells demonstrate no acute abnormality. SOFT TISSUES/SKULL:  No acute abnormality of the visualized skull or soft tissues. Chronic deviation of the nasal septum. 1.  No acute intracranial abnormality. Moderate to severe senescent changes. Intracranial atherosclerotic disease. 2.  Mild prominence of ventricular system which is out of proportion to the degree of gyral atrophy. Consider normal pressure hydrocephalus in the right clinical setting.     Xr Chest Portable    Result Date: 12/9/2020

## 2020-12-15 NOTE — PROGRESS NOTES
Occupational Therapy  OT SESSION ATTEMPT     Date:12/15/2020  Patient Name: Tara Duong  MRN: [id number]  : 1940  Room: [facility]     Attempted OT session this date:    [] unavailable due to other medical staff currently with pt   [] on hold per nursing staff   [] on hold per nursing staff secondary to lab / radiology results    [] pt declined due to ____. Benefits of participation in therapy reviewed with pt.    [] off unit   [x] Other: Pt's family planning on pt going home with hospice. Please re-order OT Evaluation is status changes.     Jose David Wilcox, OTR/L #[id number]

## 2020-12-15 NOTE — PROGRESS NOTES
Department of Internal Medicine  General Internal Medicine  Attending Progress Note      Subjective:    Came to see patient for possible discharge home on hospice but unfortunately a trial was made to take patient off BiPAP and put her on 15 L for transport home and patient's saturations dropped very low in the 80s so she was placed back on the BiPAP and it was determined that she is not going to tolerate the trip home and she might pass on route.   Family was updated and they decided to come in and take patient off of BiPAP after putting on comfort care measures and allow patient to pass in the hospital.    Objective:    /83   Pulse 105   Temp 97.5 °F (36.4 °C) (Infrared)   Resp 16   Ht 5' 2\" (1.575 m)   Wt 123 lb (55.8 kg)   SpO2 95%   BMI 22.50 kg/m²     Labs:   CBC with Differential:    Lab Results   Component Value Date    WBC 14.9 12/14/2020    RBC 4.34 12/14/2020    HGB 11.8 12/14/2020    HCT 37.2 12/14/2020     12/14/2020    MCV 85.7 12/14/2020    MCH 27.2 12/14/2020    MCHC 31.7 12/14/2020    RDW 15.9 12/14/2020    SEGSPCT 64 02/15/2013    LYMPHOPCT 5.7 12/14/2020    MONOPCT 3.6 12/14/2020    MYELOPCT 0.9 11/11/2018    BASOPCT 0.1 12/14/2020    MONOSABS 0.54 12/14/2020    LYMPHSABS 0.85 12/14/2020    EOSABS 0.00 12/14/2020    BASOSABS 0.02 12/14/2020     CMP:    Lab Results   Component Value Date     12/14/2020    K 3.2 12/14/2020     12/14/2020    CO2 22 12/14/2020    BUN 22 12/14/2020    CREATININE 1.0 12/14/2020    GFRAA >60 12/14/2020    LABGLOM 53 12/14/2020    GLUCOSE 228 12/14/2020    GLUCOSE 158 10/03/2011    PROT 6.3 12/14/2020    LABALBU 2.3 12/14/2020    LABALBU 4.0 10/03/2011    CALCIUM 9.4 12/14/2020    BILITOT 0.4 12/14/2020    ALKPHOS 170 12/14/2020    AST 29 12/14/2020    ALT 17 12/14/2020     PT/INR:    Lab Results   Component Value Date    PROTIME 11.9 12/14/2020    INR 1.0 12/14/2020            Assessment:    Active Problems: Diabetes mellitus type II, controlled ([facility] [address]    Chronic obstructive pulmonary disease (HCC)    Hyponatremia    Altered mental status    Generalized weakness    Essential hypertension    Mixed hyperlipidemia    Tachycardia    Pneumonia    COVID-19 virus detected    Acute on chronic respiratory failure with hypoxia (HCC)    Acute on chronic respiratory failure with hypoxemia (HCC)  Resolved Problems:    * No resolved hospital problems. *      Plan:  1.  Hospice care will be implemented in the hospital and patient will be taken off the BiPAP after family arrives to allow patient to pass peacefully in the hospital.    See orders    Electronically signed by Akiko Iraheta MD on 12/15/2020 at 2:08 PM

## 2020-12-15 NOTE — PROGRESS NOTES
took all pt's jewelry home. Daughters present. Family went home. Waiting for hospice on call nurse to return page. No change in pt condition. 78% on room air. Resting quietly.

## 2020-12-15 NOTE — PROGRESS NOTES
Physical Therapy    [id number]    Patient unavailable for physical therapy treatment due to going on hospice per RN, no treatment given.

## 2020-12-16 NOTE — DISCHARGE SUMMARY
FINAL DIAGNOSIS  COVID-19 pneumonia  Severe COPD  Diabetes mellitus  Recent pseudomembranous colitis  Recent VRE septicemia  Subacute T12 vertebral fracture  Atrial fibrillation  Hyponatremia  Hypokalemia     Patient Active Problem List   Diagnosis    Breast mass, right    DJD (degenerative joint disease) of knee    Knee pain    Iron (Fe) deficiency anemia    Diabetes mellitus type II, controlled ([facility]    Chronic obstructive pulmonary disease (HCC)    Atelectasis    Hyponatremia    S/P total knee arthroplasty    Status post total bilateral knee replacement    Localized osteoarthrosis, lower leg    Failed total left knee replacement (HCC)    Altered mental status    Generalized weakness    Weakness of left lower extremity    Closed nondisplaced fracture of acromial end of right clavicle    Intertrochanteric fracture of right hip, closed, initial encounter ([facility]    Essential hypertension    Mixed hyperlipidemia    Bronchospasm    Tachycardia    Traumatic arthritis of wrist    Sepsis secondary to cat scratch (HCC)    Pneumonia    C. difficile colitis    Acute urinary tract infection    Fluid overload    Lethargic    COVID-19 virus detected    Acute on chronic respiratory failure with hypoxia (HCC)    Acute on chronic respiratory failure with hypoxemia (HCC)       DISCHARGE MEDS:   Current Facility-Administered Medications:     LORazepam (ATIVAN) injection 0.5 mg, 0.5 mg, Intravenous, Q8H PRN, Rosio Friend MD, 0.5 mg at 12/15/20 1155    dexamethasone (DECADRON) injection 4 mg, 4 mg, Intravenous, Q24H, Rosio Friend MD, 4 mg at 12/15/20 7363    warfarin (COUMADIN) tablet 5 mg, 5 mg, Oral, Daily, Rosio Friend MD    0.9 % sodium chloride infusion, , Intravenous, Continuous, Cassandra Friend MD, Last Rate: 50 mL/hr at 12/13/20 2235, New Bag at 12/13/20 2235   morphine (PF) injection 2 mg, 2 mg, Intravenous, Q1H PRN, 2 mg at 12/15/20 1439 **OR** morphine injection 4 mg, 4 mg, Intravenous, Q1H PRN, Evonne Mtz MD, 4 mg at 12/15/20 1541    glucose (GLUTOSE) 40 % oral gel 15 g, 15 g, Oral, PRN, Percy Castellano MD    dextrose 50 % IV solution, 12.5 g, Intravenous, PRN, Percy Castellano MD    glucagon (rDNA) injection 1 mg, 1 mg, Intramuscular, PRN, Percy Castellano MD    dextrose 5 % solution, 100 mL/hr, Intravenous, PRN, Percy Castellano MD    acetaminophen (TYLENOL) tablet 500 mg, 500 mg, Oral, Q6H PRN, Cassandra Friend MD    budesonide-formoterol (SYMBICORT) 80-4.5 MCG/ACT inhaler 2 puff, 2 puff, Inhalation, BID, Cassandra Friend MD    montelukast (SINGULAIR) tablet 10 mg, 10 mg, Oral, Nightly, Cassandra Friend MD, 10 mg at 12/13/20 2232    glimepiride (AMARYL) tablet 2 mg, 2 mg, Oral, QAM AC, Cassandra Friend MD, 2 mg at 12/14/20 0636    lactobacillus (CULTURELLE) capsule 1 capsule, 1 capsule, Oral, Q12H, Cassandra Friend MD, 1 capsule at 12/13/20 2257    Vitamin D (CHOLECALCIFEROL) tablet 2,000 Units, 2,000 Units, Oral, Daily, Cassandra Friend MD    rosuvastatin (CRESTOR) tablet 20 mg, 20 mg, Oral, Nightly, Cassandra Friend MD, 20 mg at 12/13/20 2232    losartan (COZAAR) tablet 50 mg, 50 mg, Oral, Nightly, Cassandra Friend MD, 50 mg at 12/13/20 2232    amLODIPine (NORVASC) tablet 5 mg, 5 mg, Oral, Daily, Cassandra Friend MD, 5 mg at 12/13/20 1008    calcium-vitamin D (OSCAL-500) 500-200 MG-UNIT per tablet 2 tablet, 2 tablet, Oral, BID, Cassandra Friend MD, 2 tablet at 12/10/20 0700    vancomycin (VANCOCIN) oral solution 125 mg, 125 mg, Oral, 4x daily, Cassandra Friend MD, 125 mg at 12/10/20 0700    levothyroxine (SYNTHROID) tablet 137 mcg, 137 mcg, Oral, Daily, Cassandra Friend MD, 137 mcg at 12/14/20 0636    vitamin B and C (TOTAL B-C) 1 tablet, 1 tablet, Oral, Daily, Cassandra Carbajal MD   pantoprazole (PROTONIX) tablet 40 mg, 40 mg, Oral, QAM AC, Cassandra Friend MD, 40 mg at 12/13/20 1013    albuterol-ipratropium (COMBIVENT RESPIMAT)  MCG/ACT inhaler 1 puff, 1 puff, Inhalation, Q6H PRN, Cassandra Friend MD    tiotropium (SPIRIVA) inhalation capsule 18 mcg, 18 mcg, Inhalation, Daily, Cassandra Friend MD    0.9 % sodium chloride bolus, 20 mL, Intravenous, Once, Cayden Wilcox MD    piperacillin-tazobactam (ZOSYN) 3.375 g in dextrose 5 % 50 mL IVPB extended infusion (mini-bag), 3.375 g, Intravenous, Q8H, Cayden Wilcox MD, Stopped at 12/15/20 1300    0.9 % sodium chloride infusion, 25 mL, Intravenous, Q8H, Cayden Wilcox MD, Stopped at 12/14/20 0440    metoprolol succinate (TOPROL XL) extended release tablet 50 mg, 50 mg, Oral, Daily, Rosio Friend MD, 50 mg at 12/13/20 1008    zinc sulfate (ZINCATE) capsule 50 mg, 50 mg, Oral, Daily, Darcie White MD    vitamin C (ASCORBIC ACID) tablet 1,000 mg, 1,000 mg, Oral, Daily, Darcie White MD    Consultants: Infectious disease cardiology pulmonology/intensive care Hospital Course:  67-year-old female brought in by paramedics with concern of failure to thrive, respiratory distress and increased somnolence. She was just discharged from the hospital about a week ago. Concern is that she has been short of breath, she was tested positive for Covid on 11/24 while she was Hospitalized and was given remdesivir and has responded well and was discharged home. During that hospitalization she also was positive for C. difficile toxin and Enterococcus VRE in the blood culture. She was discharged on Zyvox but according to family members they never picked the prescription. She Has not been eating or drinking very much.  Reportedly family called paramedics because she was laying around and and being so somnolent for about a week.    Her pulse ox was in the 80s on room air but was placed on 4 L nasal cannula and then later was placed on BiPAP.  She reportedly uses nasal cannula oxygen all the time at home. Maria De Jesus Lal appears very sleepy just responding to her name by shaking her hand ,apparently she received some fentanyl earlier as she was pulling up on her BiPAP , history was taken from nursing staff and ER document.  Appears frail, fatigued, laying on her side in the fetal position.  Reportedly this is been ongoing for about 1 week, moderate severity, persistent, most likely chronic and onset gradual in onset.    Covid test is pending  CT scan of the brain did not show any acute ischemic change but showed chronic microvascular ischemic changes and possibility of normal pressure hydrocephalus. She appears to be tachycardic with sinus tachycardia on the monitor with a rate of 140 bpm.  Blood pressure stable and she remains afebrile. Patient was admitted to [address] [place] [address] with telemetry. Infectious disease consultation ordered serum Covid antibodies transfusion was administered patient already had received remdesivir and prior admission . Patient desaturated every time she takes her BiPAP off she was confused combative decision had to be made to either intubate her or consider DNR status . family requested DNR. Patient was refusing to eat and sometimes she keeps the food in her mouth does not swallow it. Social service was consulted family told social service they prefer to take her home with hospice follow-up. Patient Active Problem List   Diagnosis    Breast mass, right    DJD (degenerative joint disease) of knee    Knee pain    Iron (Fe) deficiency anemia    Diabetes mellitus type II, controlled ([address]    Chronic obstructive pulmonary disease (HCC)    Atelectasis    Hyponatremia    S/P total knee arthroplasty    Status post total bilateral knee replacement    Localized osteoarthrosis, lower leg    Failed total left knee replacement (HCC)    Altered mental status    Generalized weakness    Weakness of left lower extremity    Closed nondisplaced fracture of acromial end of right clavicle    Intertrochanteric fracture of right hip, closed, initial encounter ([address]    Essential hypertension    Mixed hyperlipidemia    Bronchospasm    Tachycardia    Traumatic arthritis of wrist    Sepsis secondary to cat scratch ([address]    Pneumonia    C. difficile colitis    Acute urinary tract infection    Fluid overload    Lethargic    COVID-19 virus detected    Acute on chronic respiratory failure with hypoxia (HCC)    Acute on chronic respiratory failure with hypoxemia ([address]   . Patient was discharged on medications listed above, in a guarded condition for comfort care, under [facility] [place] [facility] to be followed up as an outpatient by Talia Regalado MD.  Please call for an appointment one week after discharge.

## 2020-12-16 NOTE — PROGRESS NOTES
Received a call from Georges Andrews, [facility]. She stated MD would not be at the hospital till later this afternoon for scripts and patient should be ready for discharge around 1700. This nurse called pharmacy with billing information. [facility] was called previously. Set up transportation for patient. Physician ambulance outsourced to [facility] for a transport time of 1700. Daughter, Maicol Leach updated. Hospice Intake department updated.    Electronically signed by Ama Holley RN on 12/16/2020 at 1:05 PM No significant past surgical history

## 2020-12-16 NOTE — CARE COORDINATION
SS NOTE: COVID POSITIVE 12/10. SW recd a call from [facility] [place] Rodriguez Liaison May, [phone]. She has completed arrangements for pt to be transferred home with [facility] today at [facility] via [facility]. Ambulance Form completed and given to nursing. Jimbo Rendon. 12/16/2020.1:26PM.

## 2020-12-16 NOTE — CARE COORDINATION
SS NOTE: COVID POSITIVE 12/10. SW recd a call from  Rujacqui Fink Liaison May, [phone]. She plans on arranging the ambulance for pt's transfer home with family today. Will await her cue for the [facility] time today. Ambulance Form completed and given to nursing. [facility]. 12/16/2020.10:19AM.

## 2020-12-16 NOTE — PROGRESS NOTES
[facility] Progress Note    Date:  12/16/20  Hospital Day:  Hospital Day: 8  PT Name:  Anny Gee  MRN:   [id number]  Primary Care Physician: Silva Evangelista MD  Requesting physician:   Silva Evangelista MD    Reason for Consultation:  Reason for follow up: antibiotics covid /pneumonia  Chief Complaint:   Chief Complaint   Patient presents with    Shortness of Breath     Recent positive Covid-19 test    Failure To Thrive     Not eating or drinking much per EMS     Subjective/HPI/:  Xiomara Rendon was seen and examined at bedside today for covid/pneumonia  Overnight: [facility]  PT IS ON nc  There are no adverse drug reactions noted    All tests were reviewed. ROS: unable due to pt's status        Assessment  Anny Gee is a 79y.o. year old female who presented on 12/9/2020 and is being treated for <principal problem not specified>   Chief Complaint   Patient presents with    Shortness of Breath     Recent positive Covid-19 test    Failure To Thrive     Not eating or drinking much per EMS   SARS-COV-2- positive with aspiration pneumonia  rx hcap aspiration piptazo   S/p remdesivir  COVID-19 Convalescent Plasma was ordered covid AB + will not need now  Leukocytosis follow   Cdiff vanco po   Sp rx VSE bacteremia off atbx     Plan      PT IS HOSPICE ID S/O  ·      · Continue current therapy. · Please see orders for further management and care.     Hospital Medications      LORazepam (ATIVAN) injection 0.5 mg, Q8H PRN      dexamethasone (DECADRON) injection 4 mg, Q24H      warfarin (COUMADIN) tablet 5 mg, Daily      0.9 % sodium chloride infusion, Continuous      morphine (PF) injection 2 mg, Q1H PRN    Or      morphine injection 4 mg, Q1H PRN      glucose (GLUTOSE) 40 % oral gel 15 g, PRN      dextrose 50 % IV solution, PRN      glucagon (rDNA) injection 1 mg, PRN      dextrose 5 % solution, PRN      acetaminophen (TYLENOL) tablet 500 mg, Q6H PRN   budesonide-formoterol (SYMBICORT) 80-4.5 MCG/ACT inhaler 2 puff, BID      montelukast (SINGULAIR) tablet 10 mg, Nightly      glimepiride (AMARYL) tablet 2 mg, QAM AC      lactobacillus (CULTURELLE) capsule 1 capsule, Q12H      Vitamin D (CHOLECALCIFEROL) tablet 2,000 Units, Daily      rosuvastatin (CRESTOR) tablet 20 mg, Nightly      losartan (COZAAR) tablet 50 mg, Nightly      amLODIPine (NORVASC) tablet 5 mg, Daily      calcium-vitamin D (OSCAL-500) 500-200 MG-UNIT per tablet 2 tablet, BID      vancomycin (VANCOCIN) oral solution 125 mg, 4x daily      levothyroxine (SYNTHROID) tablet 137 mcg, Daily      vitamin B and C (TOTAL B-C) 1 tablet, Daily      pantoprazole (PROTONIX) tablet 40 mg, QAM AC      albuterol-ipratropium (COMBIVENT RESPIMAT)  MCG/ACT inhaler 1 puff, Q6H PRN      tiotropium (SPIRIVA) inhalation capsule 18 mcg, Daily      0.9 % sodium chloride bolus, Once      piperacillin-tazobactam (ZOSYN) 3.375 g in dextrose 5 % 50 mL IVPB extended infusion (mini-bag), Q8H      0.9 % sodium chloride infusion, Q8H      metoprolol succinate (TOPROL XL) extended release tablet 50 mg, Daily      zinc sulfate (ZINCATE) capsule 50 mg, Daily      vitamin C (ASCORBIC ACID) tablet 1,000 mg, Daily        PRN Medications  LORazepam, morphine **OR** morphine, glucose, dextrose, glucagon (rDNA), dextrose, acetaminophen, albuterol-ipratropium  Allergies   Allergen Reactions    Latex Itching and Rash    Codeine Anxiety    Morphine Anxiety     Objective  Most Recent Recorded Vitals  Vitals:    12/16/20 0800   BP: (!) 166/85   Pulse: 127   Resp: 16   Temp: 97.5 °F (36.4 °C)   SpO2: (!) 74%     I/O last 3 completed shifts: In: 0   Out: 800 [Urine:800]  No intake/output data recorded. Physical Exam:  General:  nad  Heent:   AT/NC  OPEN EYES  Skin:    No rashes or lesions. Lungs:   Dec  to auscultation bilaterally ant NO WHEEZE .     Heart:   Regular rate and regular rhythm Abdomen:   Soft, non-tender; bowel sounds normal   Extremities:    no edema  Neurologic:   lethargic  Psych:      Lines   PIv  Stark yellow    DATA:  Microbiology   BLOOD CX #1  No results for input(s): BC in the last 72 hours. BLOOD CX #2  No results for input(s): Katie Carpio in the last 72 hours.    SARS-COV-2 biomarkers  Recent Labs     12/14/20 0053 12/14/20  0736 12/16/20  0918   INR 1.1 1.0 1.1   PROTIME 12.2 11.9 12.0   AST 31 29  --    ALT 19 17  --      Lab Results   Component Value Date    CHOL 180 11/25/2020    TRIG 131 11/25/2020    HDL 67 11/25/2020    LDLCALC 87 11/25/2020    LABVLDL 26 11/25/2020     Lab Results   Component Value Date/Time    VITD25 17 (L) 11/25/2020 08:26 AM     Recent Labs     12/14/20 0736 12/16/20  0918   WBC 14.9* 13.1*   HGB 11.8 11.6   HCT 37.2 35.7    368   MCV 85.7 84.4   MCH 27.2 27.4   MCHC 31.7* 32.5   RDW 15.9* 16.3*   LYMPHOPCT 5.7* 13.8*   MONOPCT 3.6 5.1   BASOPCT 0.1 0.2   MONOSABS 0.54 0.67   LYMPHSABS 0.85* 1.81   EOSABS 0.00* 0.06   BASOSABS 0.02 0.02     Recent Labs     12/14/20 0053 12/14/20  0736    141   K 3.3* 3.2*    103   CO2 22 22   BUN 21 22   CREATININE 0.9 1.0   GFRAA >60 >60   LABGLOM >60 53   GLUCOSE 220* 228*   PROT 6.3* 6.3*   LABALBU 2.0* 2.3*   CALCIUM 9.4 9.4   BILITOT 0.4 0.4   ALKPHOS 101 170*   AST 31 29   ALT 19 17     U/A:    Lab Results   Component Value Date    COLORU Yellow 12/09/2020    PROTEINU >=300 12/09/2020    PHUR 6.5 12/09/2020    LABCAST RARE 11/11/2018    WBCUA 0-1 12/09/2020    WBCUA 2-5 04/07/2011    RBCUA 1-3 12/09/2020    RBCUA 1-3 01/22/2014    BACTERIA RARE 12/09/2020    CLARITYU Clear 12/09/2020    SPECGRAV 1.025 12/09/2020    LEUKOCYTESUR Negative 12/09/2020    UROBILINOGEN 0.2 12/09/2020    BILIRUBINUR Negative 12/09/2020    BILIRUBINUR NEGATIVE 04/07/2011    BLOODU TRACE 12/09/2020    GLUCOSEU Negative 12/09/2020    GLUCOSEU NEGATIVE 04/07/2011       Lab Results   Component Value Date CRP 17.6 (H) 12/12/2020    CRP 2.8 (H) 11/25/2020    CRP 4.1 (H) 11/24/2020     Lab Results   Component Value Date    SEDRATE 111 (H) 12/12/2020    SEDRATE 86 (H) 12/29/2019    SEDRATE 84 (H) 12/29/2019       Lab Results   Component Value Date    BC 5 Days no growth 12/09/2020    BC 5 Days no growth 11/20/2020    BC Beta Lactamase negative 11/17/2020    BC 5 Days- no growth 05/25/2020    BC 5 Days- no growth 12/29/2019    BC 5 Days- no growth 12/27/2019    BC 5 Days- no growth 11/11/2018    BC 5 Days- no growth 02/17/2016     Urine Culture, Routine   Date Value Ref Range Status   12/09/2020 Growth not present  Final   11/17/2020 >100,000 CFU/ml  Final   05/25/2020   Final    <10,000 CFU/mL  Mixed gram positive organisms  Gram negative rods       Organism   Date Value Ref Range Status   11/17/2020 Enterococcus faecium (A)  Final   11/17/2020 Enterococcus faecium (A)  Final   11/17/2020 Staphylococcus coagulase-negative (A)  Final       Echo Transesophageal    Result Date: 11/23/2020 Transesophageal Echocardiography Report (CARLOS)   Demographics   Patient Name        Kayy Lambert Gender               Female   Medical Record      [id number]     Room Number          ENDOPL  Number   Account #           [de-identified]    Procedure Date       11/23/2020   Corporate ID                     Ordering Physician   Tamera Richmond MD   Accession Number    [id number]   Referring Physician   Date of Birth       1940   Sonographer          Mehdi [de-identified] [facility]   Age                 79 year(s)   Interpreting         Tamera Richmond MD                                   Physician                                    Any Other  Procedure Type of Study   CARLOS procedure:Transesophageal Echo (CARLOS), Color Flow Velocity Mapping. Procedure Date Date: 11/23/2020 Start: 02:22 PM Study Location: Portable Technical Quality: Adequate visualization Indications:R/O Endocarditis. Patient Status: Routine Height: 65 inches Weight: 132 pounds BSA: 1.66 m^2 BMI: 21.97 kg/m^2 BP: 143/73 mmHg CARLOS Performed By: the attending and the sonographer  Type of Anesthesia: General anesthesia   Findings   Left Ventricle  Normal left ventricular systolic function. Right Ventricle  Normal right ventricle structure and function. Mitral Valve  Normal mitral valve structure and function. No mitral valve regurgitation. Tricuspid Valve  Normal tricuspid valve structure and function. Trace tricuspid valve regurgitation. Aortic Valve  Aortic valve calcification. No aortic regurgitation. Pulmonic Valve  Normal pulmonic valve structure and function. No pulmonic valve regurgitation. Aorta  Moderate atherosclerosis of the descending aorta and aortic arch. Conclusions   Summary  No intracardiac vegetation. Normal left ventricular systolic function. Normal right ventricle structure and function. Aortic valve calcification. No aortic regurgitation. Moderate atherosclerosis of the descending aorta and aortic arch.    Signature ----------------------------------------------------------------  Electronically signed by Julia Aguilar MD(Interpreting  physician) on 11/23/2020 03:51 PM  ----------------------------------------------------------------    Xr Chest (2 Vw)    Result Date: 11/23/2020  EXAMINATION: TWO XRAY VIEWS OF THE CHEST 11/23/2020 9:05 am COMPARISON: Comparison is dated November 17, 2020 HISTORY: ORDERING SYSTEM PROVIDED HISTORY: Follow-up pneumonia TECHNOLOGIST PROVIDED HISTORY: Reason for exam:->Follow-up pneumonia FINDINGS: Streaky opacity in the right upper lobe apex is unchanged. There is some increased patchy and linear opacity at the right base on the left parahilar region new since the prior study. There is diffuse bilateral interstitial prominence. Cardiac and mediastinal contours do not appear changed    Increasing bilateral pulmonary infiltrates/atelectasis    Xr Chest (2 Vw)    Result Date: 11/17/2020  EXAMINATION: TWO XRAY VIEWS OF THE CHEST 11/17/2020 6:25 pm COMPARISON: 10/06/2020 HISTORY: ORDERING SYSTEM PROVIDED HISTORY: Weakness/N/V TECHNOLOGIST PROVIDED HISTORY: Reason for exam:->Weakness/N/V FINDINGS: The cardiomediastinal silhouette is mildly enlarged in size and contour. Hyperinflation. Coarsened bibasilar interstitial thickening and left suprahilar opacity. .  New right upper lobe opacity. .  No pleural effusion or pneumothorax is present. Osteopenia. Stable upper lumbar compression fracture. Stable mid thoracic spine compression fracture. Hyperinflation. Coarsened bibasilar interstitial opacities with new right upper lobe airspace disease. Findings could represent interstitial alveolar edema versus developing pneumonia. Follow-up to assure resolution following medical treatment course recommended.     Xr Lumbar Spine (2-3 Views)    Result Date: 11/28/2020 EXAMINATION: THREE XRAY VIEWS OF THE LUMBAR SPINE 11/28/2020 12:55 am COMPARISON: CT lumbar spine October 25, 2020. HISTORY: ORDERING SYSTEM PROVIDED HISTORY: fall coccyx TECHNOLOGIST PROVIDED HISTORY: Reason for exam:->fall coccyx FINDINGS: There appears to be slight increased anterior wedging of the previously seen T12 fracture. Unchanged superior endplate compression deformity at L3. Normal alignment. Diffuse osteopenia. 1. There appears to be slight increased anterior wedging of the previously seen T12 fracture. 2. Unchanged superior endplate compression deformity at L3. Xr Sacrum Coccyx (min 2 Views)    Result Date: 11/28/2020  EXAMINATION: THREE XRAY VIEWS OF THE SACRUM/COCCYX 11/28/2020 12:55 am COMPARISON: May 18, 2020. Eric Isidro HISTORY: ORDERING SYSTEM PROVIDED HISTORY: fall TECHNOLOGIST PROVIDED HISTORY: Reason for exam:->fall FINDINGS: Diffuse osteopenia. No obvious sacral fracture. No acute findings and no significant change from prior study.     Ct Head Wo Contrast    Result Date: 12/9/2020 EXAMINATION: CT OF THE HEAD WITHOUT CONTRAST  12/9/2020 9:36 pm TECHNIQUE: CT of the head was performed without the administration of intravenous contrast. Dose modulation, iterative reconstruction, and/or weight based adjustment of the mA/kV was utilized to reduce the radiation dose to as low as reasonably achievable. COMPARISON: CT head from October 6, 2020 HISTORY: ORDERING SYSTEM PROVIDED HISTORY: fatigue TECHNOLOGIST PROVIDED HISTORY: Has a \"code stroke\" or \"stroke alert\" been called? ->No Reason for exam:->fatigue FINDINGS: BRAIN/VENTRICLES: Moderate to severe periventricular and deep white matter hypoattenuating regions are suggestive of areas of chronic microvascular ischemic change. There is no evidence of acute ischemia, hemorrhage, or herniation. The ventricular system is prominent which is slightly out of proportion to the degree of gyral atrophy. Intracranial atherosclerotic disease. ORBITS: The visualized portion of the orbits demonstrate no acute abnormality. SINUSES: The visualized paranasal sinuses and mastoid air cells demonstrate no acute abnormality. SOFT TISSUES/SKULL:  No acute abnormality of the visualized skull or soft tissues. Chronic deviation of the nasal septum. 1.  No acute intracranial abnormality. Moderate to severe senescent changes. Intracranial atherosclerotic disease. 2.  Mild prominence of ventricular system which is out of proportion to the degree of gyral atrophy. Consider normal pressure hydrocephalus in the right clinical setting.     Xr Chest Portable    Result Date: 12/9/2020 EXAMINATION: ONE XRAY VIEW OF THE CHEST 12/9/2020 7:26 pm COMPARISON: 11/30/2020 HISTORY: ORDERING SYSTEM PROVIDED HISTORY: failure to thrive TECHNOLOGIST PROVIDED HISTORY: Reason for exam:->failure to thrive FINDINGS: Heart and the mediastinal structures are normal.  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 Strandy and hazy perihilar densities are present with  hazy densities extending the lung bases. COPD with scarring with atelectasis/hazy infiltrative densities lung bases concerning for pneumonia. Xr Chest Portable    Result Date: 11/30/2020  EXAMINATION: ONE XRAY VIEW OF THE CHEST 11/30/2020 2:07 pm COMPARISON: November 27, 2020 HISTORY: ORDERING SYSTEM PROVIDED HISTORY: Follow-up on pneumonia TECHNOLOGIST PROVIDED HISTORY: Reason for exam:->Follow-up on pneumonia FINDINGS: Stable cardiomediastinal silhouette. There is mild pulmonary vascular congestion. Patchy bilateral opacities are not significantly changed. No pleural effusion or pneumothorax seen. There are degenerative changes in the shoulders and spine. 1.  Patchy bilateral opacities are not significantly changed. Differential includes atelectasis and infection. 2.  Mild pulmonary vascular congestion. Xr Chest Portable    Result Date: 11/27/2020  EXAMINATION: ONE XRAY VIEW OF THE CHEST 11/27/2020 1:07 pm COMPARISON: November 23, 2020 HISTORY: ORDERING SYSTEM PROVIDED HISTORY: Pneumonia follow-up TECHNOLOGIST PROVIDED HISTORY: Reason for exam:->Pneumonia follow-up FINDINGS: There is improved aeration of the lung bases since the prior examination. No convincing evidence of a focal consolidative airspace opacity, pneumothorax or pleural effusion. Heart size is unable to be accurately assessed on this single portable view of the chest, but appears to be stable. No acute osseous abnormality. Improved aeration of the lungs compared with the most recent examination. No radiographic evidence of acute cardiopulmonary process on today's study. Imaging and labs were reviewed per medical records and any ID pertinent labs were addressed with the patient. Thank you for involving me in the care of Lynnette Petersen. Please do not hesitate to call for any questions or concerns.     Electronically signed by Cayden Wilcox MD on 12/16/2020 at 10:11 AM    Phone [phone]  Fax [phone]    Electronically signed by Cayden Wilcox MD on 12/16/2020 at 10:11 AM

## (undated) DEVICE — GOWN,SIRUS,NONRNF,SETINSLV,XL,20/CS: Brand: MEDLINE

## (undated) DEVICE — PEN: MARKING STD 100/CS: Brand: MEDICAL ACTION INDUSTRIES